# Patient Record
Sex: FEMALE | Race: WHITE | NOT HISPANIC OR LATINO
[De-identification: names, ages, dates, MRNs, and addresses within clinical notes are randomized per-mention and may not be internally consistent; named-entity substitution may affect disease eponyms.]

---

## 2017-02-01 ENCOUNTER — APPOINTMENT (OUTPATIENT)
Dept: CARDIOLOGY | Facility: CLINIC | Age: 62
End: 2017-02-01

## 2017-02-01 ENCOUNTER — NON-APPOINTMENT (OUTPATIENT)
Age: 62
End: 2017-02-01

## 2017-02-01 ENCOUNTER — MEDICATION RENEWAL (OUTPATIENT)
Age: 62
End: 2017-02-01

## 2017-02-01 VITALS
HEIGHT: 62 IN | DIASTOLIC BLOOD PRESSURE: 93 MMHG | OXYGEN SATURATION: 98 % | BODY MASS INDEX: 27.97 KG/M2 | SYSTOLIC BLOOD PRESSURE: 166 MMHG | HEART RATE: 62 BPM | WEIGHT: 152 LBS

## 2017-02-01 LAB — CK SERPL-CCNC: 62 U/L

## 2017-02-01 RX ORDER — CLINDAMYCIN HYDROCHLORIDE 150 MG/1
150 CAPSULE ORAL
Qty: 21 | Refills: 0 | Status: DISCONTINUED | COMMUNITY
Start: 2016-11-03

## 2017-02-02 LAB
CK MB BLD-MCNC: 1.5 NG/ML
CK MB CFR SERPL: 2.4 %
TROPONIN T SERPL-MCNC: <0.01 NG/ML

## 2017-02-07 ENCOUNTER — TRANSCRIPTION ENCOUNTER (OUTPATIENT)
Age: 62
End: 2017-02-07

## 2017-02-09 ENCOUNTER — APPOINTMENT (OUTPATIENT)
Dept: CARDIOLOGY | Facility: CLINIC | Age: 62
End: 2017-02-09

## 2017-02-15 ENCOUNTER — APPOINTMENT (OUTPATIENT)
Dept: CARDIOLOGY | Facility: CLINIC | Age: 62
End: 2017-02-15

## 2017-02-21 ENCOUNTER — RX RENEWAL (OUTPATIENT)
Age: 62
End: 2017-02-21

## 2017-02-23 ENCOUNTER — RX RENEWAL (OUTPATIENT)
Age: 62
End: 2017-02-23

## 2017-03-09 ENCOUNTER — TRANSCRIPTION ENCOUNTER (OUTPATIENT)
Age: 62
End: 2017-03-09

## 2017-03-16 ENCOUNTER — APPOINTMENT (OUTPATIENT)
Dept: INTERNAL MEDICINE | Facility: CLINIC | Age: 62
End: 2017-03-16

## 2017-03-16 VITALS
HEIGHT: 62 IN | BODY MASS INDEX: 27.6 KG/M2 | RESPIRATION RATE: 16 BRPM | HEART RATE: 84 BPM | TEMPERATURE: 97.8 F | OXYGEN SATURATION: 96 % | WEIGHT: 150 LBS | SYSTOLIC BLOOD PRESSURE: 128 MMHG | DIASTOLIC BLOOD PRESSURE: 86 MMHG

## 2017-03-16 DIAGNOSIS — D22.9 MELANOCYTIC NEVI, UNSPECIFIED: ICD-10-CM

## 2017-03-16 DIAGNOSIS — R12 HEARTBURN: ICD-10-CM

## 2017-03-16 LAB
DATE COLLECTED: NORMAL
HEMOCCULT SP1 STL QL: NEGATIVE

## 2017-03-17 ENCOUNTER — MEDICATION RENEWAL (OUTPATIENT)
Age: 62
End: 2017-03-17

## 2017-03-18 LAB
25(OH)D3 SERPL-MCNC: 39.4 NG/ML
ALBUMIN SERPL ELPH-MCNC: 4.6 G/DL
ALP BLD-CCNC: 81 U/L
ALT SERPL-CCNC: 28 U/L
ANION GAP SERPL CALC-SCNC: 16 MMOL/L
AST SERPL-CCNC: 27 U/L
BASOPHILS # BLD AUTO: 0.04 K/UL
BASOPHILS NFR BLD AUTO: 0.6 %
BILIRUB SERPL-MCNC: 0.2 MG/DL
BUN SERPL-MCNC: 19 MG/DL
CALCIUM SERPL-MCNC: 9.8 MG/DL
CHLORIDE SERPL-SCNC: 100 MMOL/L
CHOLEST SERPL-MCNC: 157 MG/DL
CHOLEST/HDLC SERPL: 2 RATIO
CO2 SERPL-SCNC: 25 MMOL/L
CREAT SERPL-MCNC: 0.79 MG/DL
EOSINOPHIL # BLD AUTO: 0.15 K/UL
EOSINOPHIL NFR BLD AUTO: 2.3 %
GLUCOSE SERPL-MCNC: 100 MG/DL
HBA1C MFR BLD HPLC: 6.3 %
HCT VFR BLD CALC: 45.5 %
HDLC SERPL-MCNC: 79 MG/DL
HGB BLD-MCNC: 14.6 G/DL
IMM GRANULOCYTES NFR BLD AUTO: 0.3 %
LDLC SERPL CALC-MCNC: 66 MG/DL
LYMPHOCYTES # BLD AUTO: 1.9 K/UL
LYMPHOCYTES NFR BLD AUTO: 29.2 %
MAN DIFF?: NORMAL
MCHC RBC-ENTMCNC: 29.4 PG
MCHC RBC-ENTMCNC: 32.1 GM/DL
MCV RBC AUTO: 91.7 FL
MONOCYTES # BLD AUTO: 0.47 K/UL
MONOCYTES NFR BLD AUTO: 7.2 %
NEUTROPHILS # BLD AUTO: 3.93 K/UL
NEUTROPHILS NFR BLD AUTO: 60.4 %
PLATELET # BLD AUTO: 339 K/UL
POTASSIUM SERPL-SCNC: 4.6 MMOL/L
PROT SERPL-MCNC: 7.2 G/DL
RBC # BLD: 4.96 M/UL
RBC # FLD: 14.4 %
SODIUM SERPL-SCNC: 141 MMOL/L
TRIGL SERPL-MCNC: 58 MG/DL
TSH SERPL-ACNC: 1.21 UIU/ML
WBC # FLD AUTO: 6.51 K/UL

## 2017-03-24 ENCOUNTER — APPOINTMENT (OUTPATIENT)
Dept: INTERNAL MEDICINE | Facility: CLINIC | Age: 62
End: 2017-03-24

## 2017-05-02 ENCOUNTER — MEDICATION RENEWAL (OUTPATIENT)
Age: 62
End: 2017-05-02

## 2017-05-04 ENCOUNTER — APPOINTMENT (OUTPATIENT)
Dept: DERMATOLOGY | Facility: CLINIC | Age: 62
End: 2017-05-04

## 2017-05-04 VITALS — SYSTOLIC BLOOD PRESSURE: 132 MMHG | DIASTOLIC BLOOD PRESSURE: 80 MMHG

## 2017-05-04 DIAGNOSIS — I78.1 NEVUS, NON-NEOPLASTIC: ICD-10-CM

## 2017-05-04 DIAGNOSIS — L81.4 OTHER MELANIN HYPERPIGMENTATION: ICD-10-CM

## 2017-05-04 DIAGNOSIS — Z12.83 ENCOUNTER FOR SCREENING FOR MALIGNANT NEOPLASM OF SKIN: ICD-10-CM

## 2017-05-04 DIAGNOSIS — L82.1 OTHER SEBORRHEIC KERATOSIS: ICD-10-CM

## 2017-06-27 ENCOUNTER — LABORATORY RESULT (OUTPATIENT)
Age: 62
End: 2017-06-27

## 2017-06-28 LAB
ALBUMIN SERPL ELPH-MCNC: 4.7 G/DL
ALP BLD-CCNC: 77 U/L
ALT SERPL-CCNC: 24 U/L
ANION GAP SERPL CALC-SCNC: 16 MMOL/L
APPEARANCE: CLEAR
AST SERPL-CCNC: 19 U/L
BASOPHILS # BLD AUTO: 0.02 K/UL
BASOPHILS NFR BLD AUTO: 0.3 %
BILIRUB SERPL-MCNC: 0.4 MG/DL
BILIRUBIN URINE: NEGATIVE
BLOOD URINE: ABNORMAL
BUN SERPL-MCNC: 17 MG/DL
CALCIUM SERPL-MCNC: 9.7 MG/DL
CHLORIDE SERPL-SCNC: 99 MMOL/L
CHOLEST SERPL-MCNC: 166 MG/DL
CHOLEST/HDLC SERPL: 1.7 RATIO
CO2 SERPL-SCNC: 26 MMOL/L
COLOR: YELLOW
CREAT SERPL-MCNC: 0.83 MG/DL
EOSINOPHIL # BLD AUTO: 0.16 K/UL
EOSINOPHIL NFR BLD AUTO: 2.4 %
GLUCOSE QUALITATIVE U: NORMAL MG/DL
GLUCOSE SERPL-MCNC: 101 MG/DL
HBA1C MFR BLD HPLC: 6.4 %
HCT VFR BLD CALC: 45.7 %
HDLC SERPL-MCNC: 95 MG/DL
HGB BLD-MCNC: 15.5 G/DL
IMM GRANULOCYTES NFR BLD AUTO: 0.3 %
KETONES URINE: NEGATIVE
LDLC SERPL CALC-MCNC: 61 MG/DL
LEUKOCYTE ESTERASE URINE: NEGATIVE
LYMPHOCYTES # BLD AUTO: 2.12 K/UL
LYMPHOCYTES NFR BLD AUTO: 31.9 %
MAN DIFF?: NORMAL
MCHC RBC-ENTMCNC: 30.5 PG
MCHC RBC-ENTMCNC: 33.9 GM/DL
MCV RBC AUTO: 89.8 FL
MONOCYTES # BLD AUTO: 0.63 K/UL
MONOCYTES NFR BLD AUTO: 9.5 %
NEUTROPHILS # BLD AUTO: 3.7 K/UL
NEUTROPHILS NFR BLD AUTO: 55.6 %
NITRITE URINE: NEGATIVE
PH URINE: 6
PLATELET # BLD AUTO: 272 K/UL
POTASSIUM SERPL-SCNC: 4.8 MMOL/L
PROT SERPL-MCNC: 7.8 G/DL
PROTEIN URINE: NEGATIVE MG/DL
RBC # BLD: 5.09 M/UL
RBC # FLD: 13.8 %
SODIUM SERPL-SCNC: 141 MMOL/L
SPECIFIC GRAVITY URINE: 1.02
TRIGL SERPL-MCNC: 51 MG/DL
UROBILINOGEN URINE: NORMAL MG/DL
WBC # FLD AUTO: 6.65 K/UL

## 2017-07-19 ENCOUNTER — APPOINTMENT (OUTPATIENT)
Dept: MAMMOGRAPHY | Facility: CLINIC | Age: 62
End: 2017-07-19

## 2017-07-19 ENCOUNTER — OUTPATIENT (OUTPATIENT)
Dept: OUTPATIENT SERVICES | Facility: HOSPITAL | Age: 62
LOS: 1 days | End: 2017-07-19
Payer: COMMERCIAL

## 2017-07-19 DIAGNOSIS — Z12.31 ENCOUNTER FOR SCREENING MAMMOGRAM FOR MALIGNANT NEOPLASM OF BREAST: ICD-10-CM

## 2017-07-19 DIAGNOSIS — Z00.8 ENCOUNTER FOR OTHER GENERAL EXAMINATION: ICD-10-CM

## 2017-07-19 PROCEDURE — 77067 SCR MAMMO BI INCL CAD: CPT

## 2017-07-19 PROCEDURE — 77063 BREAST TOMOSYNTHESIS BI: CPT

## 2017-07-31 ENCOUNTER — RX RENEWAL (OUTPATIENT)
Age: 62
End: 2017-07-31

## 2017-08-14 ENCOUNTER — TRANSCRIPTION ENCOUNTER (OUTPATIENT)
Age: 62
End: 2017-08-14

## 2017-08-14 ENCOUNTER — MEDICATION RENEWAL (OUTPATIENT)
Age: 62
End: 2017-08-14

## 2017-08-23 ENCOUNTER — APPOINTMENT (OUTPATIENT)
Dept: DERMATOLOGY | Facility: CLINIC | Age: 62
End: 2017-08-23
Payer: COMMERCIAL

## 2017-08-23 VITALS — SYSTOLIC BLOOD PRESSURE: 126 MMHG | DIASTOLIC BLOOD PRESSURE: 80 MMHG

## 2017-08-23 PROCEDURE — 99213 OFFICE O/P EST LOW 20 MIN: CPT | Mod: 25,GC

## 2017-08-23 PROCEDURE — 17000 DESTRUCT PREMALG LESION: CPT | Mod: GC

## 2017-08-23 PROCEDURE — 11900 INJECT SKIN LESIONS </W 7: CPT | Mod: 59,GC

## 2017-08-29 ENCOUNTER — MEDICATION RENEWAL (OUTPATIENT)
Age: 62
End: 2017-08-29

## 2017-09-20 ENCOUNTER — APPOINTMENT (OUTPATIENT)
Dept: DERMATOLOGY | Facility: CLINIC | Age: 62
End: 2017-09-20
Payer: COMMERCIAL

## 2017-09-20 ENCOUNTER — RESULT REVIEW (OUTPATIENT)
Age: 62
End: 2017-09-20

## 2017-09-20 ENCOUNTER — LABORATORY RESULT (OUTPATIENT)
Age: 62
End: 2017-09-20

## 2017-09-20 VITALS — SYSTOLIC BLOOD PRESSURE: 130 MMHG | DIASTOLIC BLOOD PRESSURE: 84 MMHG

## 2017-09-20 DIAGNOSIS — D48.7 NEOPLASM OF UNCERTAIN BEHAVIOR OF OTHER SPECIFIED SITES: ICD-10-CM

## 2017-09-20 PROCEDURE — 17110 DESTRUCTION B9 LES UP TO 14: CPT

## 2017-09-20 PROCEDURE — 11100 BX SKIN SUBCUTANEOUS&/MUCOUS MEMBRANE 1 LESION: CPT | Mod: 59

## 2017-09-20 PROCEDURE — 99213 OFFICE O/P EST LOW 20 MIN: CPT | Mod: 25

## 2017-09-27 ENCOUNTER — OTHER (OUTPATIENT)
Age: 62
End: 2017-09-27

## 2017-11-08 ENCOUNTER — APPOINTMENT (OUTPATIENT)
Dept: CARDIOLOGY | Facility: CLINIC | Age: 62
End: 2017-11-08
Payer: COMMERCIAL

## 2017-11-08 ENCOUNTER — NON-APPOINTMENT (OUTPATIENT)
Age: 62
End: 2017-11-08

## 2017-11-08 VITALS — DIASTOLIC BLOOD PRESSURE: 90 MMHG | SYSTOLIC BLOOD PRESSURE: 140 MMHG

## 2017-11-08 VITALS
BODY MASS INDEX: 27.97 KG/M2 | WEIGHT: 152 LBS | DIASTOLIC BLOOD PRESSURE: 105 MMHG | SYSTOLIC BLOOD PRESSURE: 156 MMHG | OXYGEN SATURATION: 96 % | HEIGHT: 62 IN | HEART RATE: 67 BPM

## 2017-11-08 PROCEDURE — 99215 OFFICE O/P EST HI 40 MIN: CPT

## 2017-11-08 PROCEDURE — 93000 ELECTROCARDIOGRAM COMPLETE: CPT

## 2018-01-09 ENCOUNTER — TRANSCRIPTION ENCOUNTER (OUTPATIENT)
Age: 63
End: 2018-01-09

## 2018-01-29 ENCOUNTER — RX RENEWAL (OUTPATIENT)
Age: 63
End: 2018-01-29

## 2018-02-27 ENCOUNTER — EMERGENCY (EMERGENCY)
Facility: HOSPITAL | Age: 63
LOS: 1 days | Discharge: HOME | End: 2018-02-27
Attending: EMERGENCY MEDICINE | Admitting: EMERGENCY MEDICINE
Payer: COMMERCIAL

## 2018-02-27 VITALS
SYSTOLIC BLOOD PRESSURE: 146 MMHG | HEART RATE: 71 BPM | OXYGEN SATURATION: 93 % | DIASTOLIC BLOOD PRESSURE: 71 MMHG | TEMPERATURE: 98 F | RESPIRATION RATE: 15 BRPM

## 2018-02-27 VITALS
OXYGEN SATURATION: 96 % | SYSTOLIC BLOOD PRESSURE: 158 MMHG | WEIGHT: 149.91 LBS | DIASTOLIC BLOOD PRESSURE: 81 MMHG | HEART RATE: 78 BPM | RESPIRATION RATE: 16 BRPM | TEMPERATURE: 98 F

## 2018-02-27 LAB
ALBUMIN SERPL ELPH-MCNC: 3.9 G/DL — SIGNIFICANT CHANGE UP (ref 3.3–5)
ALP SERPL-CCNC: 76 U/L — SIGNIFICANT CHANGE UP (ref 40–120)
ALT FLD-CCNC: 36 U/L — SIGNIFICANT CHANGE UP (ref 12–78)
ANION GAP SERPL CALC-SCNC: 8 MMOL/L — SIGNIFICANT CHANGE UP (ref 5–17)
APPEARANCE UR: CLEAR — SIGNIFICANT CHANGE UP
APTT BLD: 27.6 SEC — SIGNIFICANT CHANGE UP (ref 27.5–37.4)
AST SERPL-CCNC: 22 U/L — SIGNIFICANT CHANGE UP (ref 15–37)
BACTERIA # UR AUTO: ABNORMAL
BASOPHILS # BLD AUTO: 0.1 K/UL — SIGNIFICANT CHANGE UP (ref 0–0.2)
BASOPHILS NFR BLD AUTO: 0.5 % — SIGNIFICANT CHANGE UP (ref 0–2)
BILIRUB SERPL-MCNC: 0.4 MG/DL — SIGNIFICANT CHANGE UP (ref 0.2–1.2)
BILIRUB UR-MCNC: NEGATIVE — SIGNIFICANT CHANGE UP
BUN SERPL-MCNC: 10 MG/DL — SIGNIFICANT CHANGE UP (ref 7–23)
CALCIUM SERPL-MCNC: 9 MG/DL — SIGNIFICANT CHANGE UP (ref 8.5–10.1)
CHLORIDE SERPL-SCNC: 100 MMOL/L — SIGNIFICANT CHANGE UP (ref 96–108)
CO2 SERPL-SCNC: 29 MMOL/L — SIGNIFICANT CHANGE UP (ref 22–31)
COLOR SPEC: YELLOW — SIGNIFICANT CHANGE UP
CREAT SERPL-MCNC: 0.59 MG/DL — SIGNIFICANT CHANGE UP (ref 0.5–1.3)
DIFF PNL FLD: ABNORMAL
EOSINOPHIL # BLD AUTO: 0.1 K/UL — SIGNIFICANT CHANGE UP (ref 0–0.5)
EOSINOPHIL NFR BLD AUTO: 1 % — SIGNIFICANT CHANGE UP (ref 0–6)
EPI CELLS # UR: SIGNIFICANT CHANGE UP
GLUCOSE SERPL-MCNC: 113 MG/DL — HIGH (ref 70–99)
GLUCOSE UR QL: NEGATIVE — SIGNIFICANT CHANGE UP
HCT VFR BLD CALC: 40 % — SIGNIFICANT CHANGE UP (ref 34.5–45)
HGB BLD-MCNC: 13.5 G/DL — SIGNIFICANT CHANGE UP (ref 11.5–15.5)
INR BLD: 1.03 RATIO — SIGNIFICANT CHANGE UP (ref 0.88–1.16)
KETONES UR-MCNC: ABNORMAL
LACTATE SERPL-SCNC: 1.3 MMOL/L — SIGNIFICANT CHANGE UP (ref 0.7–2)
LEUKOCYTE ESTERASE UR-ACNC: NEGATIVE — SIGNIFICANT CHANGE UP
LIDOCAIN IGE QN: 119 U/L — SIGNIFICANT CHANGE UP (ref 73–393)
LYMPHOCYTES # BLD AUTO: 1.5 K/UL — SIGNIFICANT CHANGE UP (ref 1–3.3)
LYMPHOCYTES # BLD AUTO: 12.9 % — LOW (ref 13–44)
MCHC RBC-ENTMCNC: 30.7 PG — SIGNIFICANT CHANGE UP (ref 27–34)
MCHC RBC-ENTMCNC: 33.8 GM/DL — SIGNIFICANT CHANGE UP (ref 32–36)
MCV RBC AUTO: 90.9 FL — SIGNIFICANT CHANGE UP (ref 80–100)
MONOCYTES # BLD AUTO: 0.9 K/UL — SIGNIFICANT CHANGE UP (ref 0–0.9)
MONOCYTES NFR BLD AUTO: 7.5 % — SIGNIFICANT CHANGE UP (ref 1–9)
NEUTROPHILS # BLD AUTO: 9.3 K/UL — HIGH (ref 1.8–7.4)
NEUTROPHILS NFR BLD AUTO: 78.1 % — HIGH (ref 43–77)
NITRITE UR-MCNC: NEGATIVE — SIGNIFICANT CHANGE UP
PH UR: 8 — SIGNIFICANT CHANGE UP (ref 5–8)
PLATELET # BLD AUTO: 262 K/UL — SIGNIFICANT CHANGE UP (ref 150–400)
POTASSIUM SERPL-MCNC: 3.7 MMOL/L — SIGNIFICANT CHANGE UP (ref 3.5–5.3)
POTASSIUM SERPL-SCNC: 3.7 MMOL/L — SIGNIFICANT CHANGE UP (ref 3.5–5.3)
PROT SERPL-MCNC: 7.4 G/DL — SIGNIFICANT CHANGE UP (ref 6–8.3)
PROT UR-MCNC: NEGATIVE — SIGNIFICANT CHANGE UP
PROTHROM AB SERPL-ACNC: 11.2 SEC — SIGNIFICANT CHANGE UP (ref 9.8–12.7)
RBC # BLD: 4.4 M/UL — SIGNIFICANT CHANGE UP (ref 3.8–5.2)
RBC # FLD: 12.2 % — SIGNIFICANT CHANGE UP (ref 10.3–14.5)
RBC CASTS # UR COMP ASSIST: ABNORMAL /HPF (ref 0–4)
SODIUM SERPL-SCNC: 137 MMOL/L — SIGNIFICANT CHANGE UP (ref 135–145)
SP GR SPEC: 1.01 — SIGNIFICANT CHANGE UP (ref 1.01–1.02)
UROBILINOGEN FLD QL: NEGATIVE — SIGNIFICANT CHANGE UP
WBC # BLD: 11.9 K/UL — HIGH (ref 3.8–10.5)
WBC # FLD AUTO: 11.9 K/UL — HIGH (ref 3.8–10.5)
WBC UR QL: SIGNIFICANT CHANGE UP

## 2018-02-27 PROCEDURE — 71046 X-RAY EXAM CHEST 2 VIEWS: CPT | Mod: 26

## 2018-02-27 PROCEDURE — 99285 EMERGENCY DEPT VISIT HI MDM: CPT

## 2018-02-27 PROCEDURE — 74177 CT ABD & PELVIS W/CONTRAST: CPT | Mod: 26

## 2018-02-27 RX ORDER — CIPROFLOXACIN LACTATE 400MG/40ML
500 VIAL (ML) INTRAVENOUS ONCE
Qty: 0 | Refills: 0 | Status: COMPLETED | OUTPATIENT
Start: 2018-02-27 | End: 2018-02-27

## 2018-02-27 RX ORDER — ONDANSETRON 8 MG/1
4 TABLET, FILM COATED ORAL ONCE
Qty: 0 | Refills: 0 | Status: COMPLETED | OUTPATIENT
Start: 2018-02-27 | End: 2018-02-27

## 2018-02-27 RX ORDER — SODIUM CHLORIDE 9 MG/ML
3 INJECTION INTRAMUSCULAR; INTRAVENOUS; SUBCUTANEOUS ONCE
Qty: 0 | Refills: 0 | Status: COMPLETED | OUTPATIENT
Start: 2018-02-27 | End: 2018-02-27

## 2018-02-27 RX ORDER — METRONIDAZOLE 500 MG
500 TABLET ORAL ONCE
Qty: 0 | Refills: 0 | Status: COMPLETED | OUTPATIENT
Start: 2018-02-27 | End: 2018-02-27

## 2018-02-27 RX ORDER — MORPHINE SULFATE 50 MG/1
4 CAPSULE, EXTENDED RELEASE ORAL ONCE
Qty: 0 | Refills: 0 | Status: DISCONTINUED | OUTPATIENT
Start: 2018-02-27 | End: 2018-02-27

## 2018-02-27 RX ORDER — IOHEXOL 300 MG/ML
30 INJECTION, SOLUTION INTRAVENOUS ONCE
Qty: 0 | Refills: 0 | Status: COMPLETED | OUTPATIENT
Start: 2018-02-27 | End: 2018-02-27

## 2018-02-27 RX ORDER — SODIUM CHLORIDE 9 MG/ML
1000 INJECTION INTRAMUSCULAR; INTRAVENOUS; SUBCUTANEOUS
Qty: 0 | Refills: 0 | Status: COMPLETED | OUTPATIENT
Start: 2018-02-27 | End: 2018-02-27

## 2018-02-27 RX ADMIN — SODIUM CHLORIDE 1000 MILLILITER(S): 9 INJECTION INTRAMUSCULAR; INTRAVENOUS; SUBCUTANEOUS at 21:16

## 2018-02-27 RX ADMIN — MORPHINE SULFATE 4 MILLIGRAM(S): 50 CAPSULE, EXTENDED RELEASE ORAL at 21:00

## 2018-02-27 RX ADMIN — ONDANSETRON 4 MILLIGRAM(S): 8 TABLET, FILM COATED ORAL at 21:00

## 2018-02-27 RX ADMIN — IOHEXOL 30 MILLILITER(S): 300 INJECTION, SOLUTION INTRAVENOUS at 19:30

## 2018-02-27 RX ADMIN — SODIUM CHLORIDE 3 MILLILITER(S): 9 INJECTION INTRAMUSCULAR; INTRAVENOUS; SUBCUTANEOUS at 19:30

## 2018-02-27 RX ADMIN — MORPHINE SULFATE 4 MILLIGRAM(S): 50 CAPSULE, EXTENDED RELEASE ORAL at 21:29

## 2018-02-27 RX ADMIN — SODIUM CHLORIDE 1000 MILLILITER(S): 9 INJECTION INTRAMUSCULAR; INTRAVENOUS; SUBCUTANEOUS at 19:31

## 2018-02-27 NOTE — ED PROVIDER NOTE - CHPI ED SYMPTOMS NEG
no chills/no abdominal distension/no diarrhea/no blood in stool/no hematuria/no burning urination/no vomiting/no dysuria/no fever

## 2018-02-27 NOTE — ED ADULT NURSE NOTE - OBJECTIVE STATEMENT
62 year old female presents to the ED with c/o anup lower abdominal pain, nausea, and diarrhea (1 episode) x 1 day. A+O x 4. Sister at the bedisde. + BS in all quadrants. Anup lower quadrants tender to palpation. Abdomen appears distended. Bladder distended and tender to palpation. Denies hematuria, dysuria, rectal bleeding. Denies recent sick contacts. Denies ingesting suspicious food. afebrile. skin warm dry and intact.

## 2018-02-27 NOTE — ED ADULT NURSE NOTE - PMH
CAD (coronary artery disease)    Cervical cancer  with leep procedure, denies chemo/ radiation  Hypercholesteremia    Hypertension    S/P coronary artery stent placement  5 years ago Dr. Guille Joyner

## 2018-02-27 NOTE — ED PROVIDER NOTE - PROGRESS NOTE DETAILS
Dw pt re CT finding,s need for abx for divertic. Dw her re large ov cysts. Pt states are well known to her for many years and are being followed by her GYN. Dw pt re need for US to ensure flow to ov. Dw pt and family RBA< refusing US at this time. Will take abx for divertic, will fu with her PMD tomorrow and will call her GYN tomorrow for prompt fu as well.  All results were explained to patient and family and a copy of all available results given.   Discussed with Patent and Family regarding the nature of the patient's infection.  At length discussion regarding the signs and symptoms of a persistent or worsening infection, the importance of close, prompt medical follow-up, and infection / fever precautions including when to immediately return to the emergency department.  An opportunity to ask questions was given and understanding of all instructions was verbalized.

## 2018-02-27 NOTE — ED ADULT NURSE NOTE - CHPI ED SYMPTOMS POS
PAIN/CRAMPS/dry heaves/NAUSEA/DECREASED EATING/DRINKING/ABDOMINAL DISTENSION/TENDERNESS/VOMITING/CONSTIPATION/DIARRHEA

## 2018-02-27 NOTE — ED PROVIDER NOTE - OBJECTIVE STATEMENT
63 yo F p/w lower abd pain since ~ 1 pm today. No vomiting / diarrhea. NO neck / back pain. No fever/chills. NO recent fall / trauma. NO agg/allev factors. NO other inj or co.

## 2018-02-27 NOTE — ED ADULT NURSE NOTE - PSH
S/P angioplasty with stent    S/P bunionectomy  right    Status post bunionectomy  right(2 yrs. ago)  Status post

## 2018-02-28 LAB
CULTURE RESULTS: SIGNIFICANT CHANGE UP
SPECIMEN SOURCE: SIGNIFICANT CHANGE UP

## 2018-02-28 PROCEDURE — 74177 CT ABD & PELVIS W/CONTRAST: CPT

## 2018-02-28 PROCEDURE — 85610 PROTHROMBIN TIME: CPT

## 2018-02-28 PROCEDURE — 83605 ASSAY OF LACTIC ACID: CPT

## 2018-02-28 PROCEDURE — 96365 THER/PROPH/DIAG IV INF INIT: CPT | Mod: XU

## 2018-02-28 PROCEDURE — 96375 TX/PRO/DX INJ NEW DRUG ADDON: CPT

## 2018-02-28 PROCEDURE — 71046 X-RAY EXAM CHEST 2 VIEWS: CPT

## 2018-02-28 PROCEDURE — 80053 COMPREHEN METABOLIC PANEL: CPT

## 2018-02-28 PROCEDURE — 85730 THROMBOPLASTIN TIME PARTIAL: CPT

## 2018-02-28 PROCEDURE — 87086 URINE CULTURE/COLONY COUNT: CPT

## 2018-02-28 PROCEDURE — 81001 URINALYSIS AUTO W/SCOPE: CPT

## 2018-02-28 PROCEDURE — 85027 COMPLETE CBC AUTOMATED: CPT

## 2018-02-28 PROCEDURE — 99284 EMERGENCY DEPT VISIT MOD MDM: CPT | Mod: 25

## 2018-02-28 PROCEDURE — 83690 ASSAY OF LIPASE: CPT

## 2018-02-28 RX ORDER — METRONIDAZOLE 500 MG
1 TABLET ORAL
Qty: 20 | Refills: 0 | OUTPATIENT
Start: 2018-02-28 | End: 2018-03-09

## 2018-02-28 RX ORDER — MOXIFLOXACIN HYDROCHLORIDE TABLETS, 400 MG 400 MG/1
1 TABLET, FILM COATED ORAL
Qty: 20 | Refills: 0 | OUTPATIENT
Start: 2018-02-28 | End: 2018-03-09

## 2018-02-28 RX ADMIN — Medication 100 MILLIGRAM(S): at 00:04

## 2018-02-28 RX ADMIN — Medication 500 MILLIGRAM(S): at 00:14

## 2018-03-06 ENCOUNTER — RESULT REVIEW (OUTPATIENT)
Age: 63
End: 2018-03-06

## 2018-03-08 ENCOUNTER — EMERGENCY (EMERGENCY)
Facility: HOSPITAL | Age: 63
LOS: 1 days | Discharge: HOME | End: 2018-03-08
Attending: EMERGENCY MEDICINE | Admitting: EMERGENCY MEDICINE
Payer: COMMERCIAL

## 2018-03-08 VITALS
HEART RATE: 88 BPM | DIASTOLIC BLOOD PRESSURE: 80 MMHG | SYSTOLIC BLOOD PRESSURE: 128 MMHG | OXYGEN SATURATION: 98 % | RESPIRATION RATE: 18 BRPM

## 2018-03-08 VITALS — HEIGHT: 62 IN | WEIGHT: 149.03 LBS

## 2018-03-08 LAB
ALBUMIN SERPL ELPH-MCNC: 3.6 G/DL — SIGNIFICANT CHANGE UP (ref 3.3–5)
ALP SERPL-CCNC: 74 U/L — SIGNIFICANT CHANGE UP (ref 40–120)
ALT FLD-CCNC: 94 U/L — HIGH (ref 12–78)
ANION GAP SERPL CALC-SCNC: 10 MMOL/L — SIGNIFICANT CHANGE UP (ref 5–17)
APPEARANCE UR: CLEAR — SIGNIFICANT CHANGE UP
APTT BLD: 28.7 SEC — SIGNIFICANT CHANGE UP (ref 27.5–37.4)
AST SERPL-CCNC: 130 U/L — HIGH (ref 15–37)
BASOPHILS # BLD AUTO: 0.1 K/UL — SIGNIFICANT CHANGE UP (ref 0–0.2)
BASOPHILS NFR BLD AUTO: 2.1 % — HIGH (ref 0–2)
BILIRUB SERPL-MCNC: 0.2 MG/DL — SIGNIFICANT CHANGE UP (ref 0.2–1.2)
BILIRUB UR-MCNC: NEGATIVE — SIGNIFICANT CHANGE UP
BUN SERPL-MCNC: 3 MG/DL — LOW (ref 7–23)
CALCIUM SERPL-MCNC: 8.5 MG/DL — SIGNIFICANT CHANGE UP (ref 8.5–10.1)
CHLORIDE SERPL-SCNC: 106 MMOL/L — SIGNIFICANT CHANGE UP (ref 96–108)
CO2 SERPL-SCNC: 24 MMOL/L — SIGNIFICANT CHANGE UP (ref 22–31)
COLOR SPEC: YELLOW — SIGNIFICANT CHANGE UP
CREAT SERPL-MCNC: 0.82 MG/DL — SIGNIFICANT CHANGE UP (ref 0.5–1.3)
DIFF PNL FLD: ABNORMAL
EOSINOPHIL # BLD AUTO: 0.1 K/UL — SIGNIFICANT CHANGE UP (ref 0–0.5)
EOSINOPHIL NFR BLD AUTO: 2.5 % — SIGNIFICANT CHANGE UP (ref 0–6)
EPI CELLS # UR: SIGNIFICANT CHANGE UP
GLUCOSE SERPL-MCNC: 122 MG/DL — HIGH (ref 70–99)
GLUCOSE UR QL: NEGATIVE — SIGNIFICANT CHANGE UP
HCT VFR BLD CALC: 44.7 % — SIGNIFICANT CHANGE UP (ref 34.5–45)
HGB BLD-MCNC: 15 G/DL — SIGNIFICANT CHANGE UP (ref 11.5–15.5)
INR BLD: 1.1 RATIO — SIGNIFICANT CHANGE UP (ref 0.88–1.16)
KETONES UR-MCNC: ABNORMAL
LEUKOCYTE ESTERASE UR-ACNC: NEGATIVE — SIGNIFICANT CHANGE UP
LIDOCAIN IGE QN: 104 U/L — SIGNIFICANT CHANGE UP (ref 73–393)
LYMPHOCYTES # BLD AUTO: 0.6 K/UL — LOW (ref 1–3.3)
LYMPHOCYTES # BLD AUTO: 15.1 % — SIGNIFICANT CHANGE UP (ref 13–44)
MCHC RBC-ENTMCNC: 30.1 PG — SIGNIFICANT CHANGE UP (ref 27–34)
MCHC RBC-ENTMCNC: 33.6 GM/DL — SIGNIFICANT CHANGE UP (ref 32–36)
MCV RBC AUTO: 89.5 FL — SIGNIFICANT CHANGE UP (ref 80–100)
MONOCYTES # BLD AUTO: 0.6 K/UL — SIGNIFICANT CHANGE UP (ref 0–0.9)
MONOCYTES NFR BLD AUTO: 15.6 % — HIGH (ref 1–9)
NEUTROPHILS # BLD AUTO: 2.5 K/UL — SIGNIFICANT CHANGE UP (ref 1.8–7.4)
NEUTROPHILS NFR BLD AUTO: 64.7 % — SIGNIFICANT CHANGE UP (ref 43–77)
NITRITE UR-MCNC: NEGATIVE — SIGNIFICANT CHANGE UP
PH UR: 7 — SIGNIFICANT CHANGE UP (ref 5–8)
PLATELET # BLD AUTO: 248 K/UL — SIGNIFICANT CHANGE UP (ref 150–400)
POTASSIUM SERPL-MCNC: 3.5 MMOL/L — SIGNIFICANT CHANGE UP (ref 3.5–5.3)
POTASSIUM SERPL-SCNC: 3.5 MMOL/L — SIGNIFICANT CHANGE UP (ref 3.5–5.3)
PROT SERPL-MCNC: 7.3 G/DL — SIGNIFICANT CHANGE UP (ref 6–8.3)
PROT UR-MCNC: NEGATIVE — SIGNIFICANT CHANGE UP
PROTHROM AB SERPL-ACNC: 12 SEC — SIGNIFICANT CHANGE UP (ref 9.8–12.7)
RBC # BLD: 4.99 M/UL — SIGNIFICANT CHANGE UP (ref 3.8–5.2)
RBC # FLD: 12 % — SIGNIFICANT CHANGE UP (ref 10.3–14.5)
RBC CASTS # UR COMP ASSIST: ABNORMAL /HPF (ref 0–4)
SODIUM SERPL-SCNC: 140 MMOL/L — SIGNIFICANT CHANGE UP (ref 135–145)
SP GR SPEC: 1 — LOW (ref 1.01–1.02)
UROBILINOGEN FLD QL: NEGATIVE — SIGNIFICANT CHANGE UP
WBC # BLD: 3.9 K/UL — SIGNIFICANT CHANGE UP (ref 3.8–10.5)
WBC # FLD AUTO: 3.9 K/UL — SIGNIFICANT CHANGE UP (ref 3.8–10.5)
WBC UR QL: SIGNIFICANT CHANGE UP

## 2018-03-08 PROCEDURE — 99285 EMERGENCY DEPT VISIT HI MDM: CPT

## 2018-03-08 PROCEDURE — 96361 HYDRATE IV INFUSION ADD-ON: CPT

## 2018-03-08 PROCEDURE — 96374 THER/PROPH/DIAG INJ IV PUSH: CPT

## 2018-03-08 PROCEDURE — 85730 THROMBOPLASTIN TIME PARTIAL: CPT

## 2018-03-08 PROCEDURE — 70450 CT HEAD/BRAIN W/O DYE: CPT

## 2018-03-08 PROCEDURE — 99284 EMERGENCY DEPT VISIT MOD MDM: CPT | Mod: 25

## 2018-03-08 PROCEDURE — 85027 COMPLETE CBC AUTOMATED: CPT

## 2018-03-08 PROCEDURE — 96375 TX/PRO/DX INJ NEW DRUG ADDON: CPT

## 2018-03-08 PROCEDURE — 70450 CT HEAD/BRAIN W/O DYE: CPT | Mod: 26

## 2018-03-08 PROCEDURE — 83690 ASSAY OF LIPASE: CPT

## 2018-03-08 PROCEDURE — 81001 URINALYSIS AUTO W/SCOPE: CPT

## 2018-03-08 PROCEDURE — 80053 COMPREHEN METABOLIC PANEL: CPT

## 2018-03-08 PROCEDURE — 85610 PROTHROMBIN TIME: CPT

## 2018-03-08 RX ORDER — KETOROLAC TROMETHAMINE 30 MG/ML
30 SYRINGE (ML) INJECTION ONCE
Qty: 0 | Refills: 0 | Status: DISCONTINUED | OUTPATIENT
Start: 2018-03-08 | End: 2018-03-08

## 2018-03-08 RX ORDER — METOCLOPRAMIDE HCL 10 MG
10 TABLET ORAL ONCE
Qty: 0 | Refills: 0 | Status: COMPLETED | OUTPATIENT
Start: 2018-03-08 | End: 2018-03-08

## 2018-03-08 RX ORDER — SODIUM CHLORIDE 9 MG/ML
1000 INJECTION INTRAMUSCULAR; INTRAVENOUS; SUBCUTANEOUS ONCE
Qty: 0 | Refills: 0 | Status: COMPLETED | OUTPATIENT
Start: 2018-03-08 | End: 2018-03-08

## 2018-03-08 RX ADMIN — Medication 10 MILLIGRAM(S): at 08:58

## 2018-03-08 RX ADMIN — Medication 30 MILLIGRAM(S): at 08:58

## 2018-03-08 RX ADMIN — SODIUM CHLORIDE 1000 MILLILITER(S): 9 INJECTION INTRAMUSCULAR; INTRAVENOUS; SUBCUTANEOUS at 08:57

## 2018-03-08 NOTE — ED PROVIDER NOTE - PROGRESS NOTE DETAILS
Reevaluated patient at bedside.  Patient feeling much improved, pain resolved.  Discussed the results of all diagnostic testing in ED and copies of all reports given.   An opportunity to ask questions was given.  Discussed the importance of prompt, close medical follow-up.  Patient will return with any changes, concerns or persistent / worsening symptoms.  Understanding of all instructions verbalized.

## 2018-03-08 NOTE — ED ADULT TRIAGE NOTE - CHIEF COMPLAINT QUOTE
pt reporting weakness, vomiting and headache. recently diagnosed with diverticulitis on 8th day of cipro/flagyl

## 2018-03-08 NOTE — ED PROVIDER NOTE - OBJECTIVE STATEMENT
pt on cipro and flagyl since 2/28 for divertic on ct c/o ha, n/v, worse after taking her abx. no fevers, chills, dizziness, weakness, numbness, speech or vision changes, abd pain. + urinary freq, but has been drinking more liquids since dx with divertic.  pmd - noemy

## 2018-03-08 NOTE — ED PROVIDER NOTE - MEDICAL DECISION MAKING DETAILS
pt on cipro flagyl since 2/28 for divertic c/o n/v and ha, worse when takes abx - labs/ct/ivf/reglan/toradol

## 2018-03-08 NOTE — ED PROVIDER NOTE - PSH
S/P angioplasty with stent   and   S/P bunionectomy  right    Status post bunionectomy  right(2 yrs. ago)  Status post

## 2018-03-18 LAB
25(OH)D3 SERPL-MCNC: 42.7 NG/ML
ALBUMIN SERPL ELPH-MCNC: 4.4 G/DL
ALP BLD-CCNC: 73 U/L
ALT SERPL-CCNC: 56 U/L
ANION GAP SERPL CALC-SCNC: 13 MMOL/L
APPEARANCE: CLEAR
AST SERPL-CCNC: 31 U/L
BASOPHILS # BLD AUTO: 0.07 K/UL
BASOPHILS NFR BLD AUTO: 1.5 %
BILIRUB SERPL-MCNC: 0.4 MG/DL
BILIRUBIN URINE: NEGATIVE
BLOOD URINE: NEGATIVE
BUN SERPL-MCNC: 13 MG/DL
CALCIUM SERPL-MCNC: 10.1 MG/DL
CHLORIDE SERPL-SCNC: 104 MMOL/L
CHOLEST SERPL-MCNC: 151 MG/DL
CHOLEST/HDLC SERPL: 3.1 RATIO
CO2 SERPL-SCNC: 28 MMOL/L
COLOR: YELLOW
CREAT SERPL-MCNC: 0.75 MG/DL
CREAT SPEC-SCNC: 36 MG/DL
EOSINOPHIL # BLD AUTO: 0.12 K/UL
EOSINOPHIL NFR BLD AUTO: 2.5 %
GLUCOSE QUALITATIVE U: NEGATIVE MG/DL
GLUCOSE SERPL-MCNC: 105 MG/DL
HBA1C MFR BLD HPLC: 6.1 %
HCT VFR BLD CALC: 41.5 %
HDLC SERPL-MCNC: 49 MG/DL
HGB BLD-MCNC: 14 G/DL
IMM GRANULOCYTES NFR BLD AUTO: 0.4 %
KETONES URINE: NEGATIVE
LDLC SERPL CALC-MCNC: 82 MG/DL
LEUKOCYTE ESTERASE URINE: NEGATIVE
LYMPHOCYTES # BLD AUTO: 1.62 K/UL
LYMPHOCYTES NFR BLD AUTO: 33.7 %
MAN DIFF?: NORMAL
MCHC RBC-ENTMCNC: 30.5 PG
MCHC RBC-ENTMCNC: 33.7 GM/DL
MCV RBC AUTO: 90.4 FL
MICROALBUMIN 24H UR DL<=1MG/L-MCNC: <0.3 MG/DL
MICROALBUMIN/CREAT 24H UR-RTO: NORMAL
MONOCYTES # BLD AUTO: 0.43 K/UL
MONOCYTES NFR BLD AUTO: 8.9 %
NEUTROPHILS # BLD AUTO: 2.55 K/UL
NEUTROPHILS NFR BLD AUTO: 53 %
NITRITE URINE: NEGATIVE
PH URINE: 7
PLATELET # BLD AUTO: 387 K/UL
POTASSIUM SERPL-SCNC: 5.2 MMOL/L
PROT SERPL-MCNC: 7 G/DL
PROTEIN URINE: NEGATIVE MG/DL
RBC # BLD: 4.59 M/UL
RBC # FLD: 13.2 %
SODIUM SERPL-SCNC: 145 MMOL/L
SPECIFIC GRAVITY URINE: 1.01
TRIGL SERPL-MCNC: 100 MG/DL
TSH SERPL-ACNC: 1.23 UIU/ML
UROBILINOGEN URINE: NEGATIVE MG/DL
WBC # FLD AUTO: 4.81 K/UL

## 2018-03-29 ENCOUNTER — APPOINTMENT (OUTPATIENT)
Dept: INTERNAL MEDICINE | Facility: CLINIC | Age: 63
End: 2018-03-29
Payer: COMMERCIAL

## 2018-03-29 VITALS
RESPIRATION RATE: 16 BRPM | HEIGHT: 62 IN | OXYGEN SATURATION: 97 % | DIASTOLIC BLOOD PRESSURE: 80 MMHG | HEART RATE: 65 BPM | BODY MASS INDEX: 27.05 KG/M2 | WEIGHT: 147 LBS | TEMPERATURE: 97.8 F | SYSTOLIC BLOOD PRESSURE: 138 MMHG

## 2018-03-29 DIAGNOSIS — I77.1 STRICTURE OF ARTERY: ICD-10-CM

## 2018-03-29 PROCEDURE — 99396 PREV VISIT EST AGE 40-64: CPT

## 2018-04-01 ENCOUNTER — FORM ENCOUNTER (OUTPATIENT)
Age: 63
End: 2018-04-01

## 2018-04-02 ENCOUNTER — APPOINTMENT (OUTPATIENT)
Dept: ULTRASOUND IMAGING | Facility: CLINIC | Age: 63
End: 2018-04-02
Payer: COMMERCIAL

## 2018-04-02 ENCOUNTER — OUTPATIENT (OUTPATIENT)
Dept: OUTPATIENT SERVICES | Facility: HOSPITAL | Age: 63
LOS: 1 days | End: 2018-04-02
Payer: COMMERCIAL

## 2018-04-02 DIAGNOSIS — R22.2 LOCALIZED SWELLING, MASS AND LUMP, TRUNK: ICD-10-CM

## 2018-04-02 PROCEDURE — 76604 US EXAM CHEST: CPT | Mod: 26

## 2018-04-02 PROCEDURE — 76604 US EXAM CHEST: CPT

## 2018-04-13 ENCOUNTER — RESULT REVIEW (OUTPATIENT)
Age: 63
End: 2018-04-13

## 2018-04-27 ENCOUNTER — RX RENEWAL (OUTPATIENT)
Age: 63
End: 2018-04-27

## 2018-05-21 ENCOUNTER — RX RENEWAL (OUTPATIENT)
Age: 63
End: 2018-05-21

## 2018-05-23 ENCOUNTER — APPOINTMENT (OUTPATIENT)
Dept: CT IMAGING | Facility: CLINIC | Age: 63
End: 2018-05-23
Payer: COMMERCIAL

## 2018-05-23 ENCOUNTER — OUTPATIENT (OUTPATIENT)
Dept: OUTPATIENT SERVICES | Facility: HOSPITAL | Age: 63
LOS: 1 days | End: 2018-05-23
Payer: COMMERCIAL

## 2018-05-23 DIAGNOSIS — Z00.8 ENCOUNTER FOR OTHER GENERAL EXAMINATION: ICD-10-CM

## 2018-05-23 PROCEDURE — 74176 CT ABD & PELVIS W/O CONTRAST: CPT | Mod: 26

## 2018-05-23 PROCEDURE — 74176 CT ABD & PELVIS W/O CONTRAST: CPT

## 2018-05-24 ENCOUNTER — INPATIENT (INPATIENT)
Facility: HOSPITAL | Age: 63
LOS: 0 days | Discharge: HOME | DRG: 392 | End: 2018-05-25
Attending: INTERNAL MEDICINE | Admitting: INTERNAL MEDICINE
Payer: COMMERCIAL

## 2018-05-24 VITALS
TEMPERATURE: 101 F | HEART RATE: 96 BPM | OXYGEN SATURATION: 96 % | RESPIRATION RATE: 16 BRPM | SYSTOLIC BLOOD PRESSURE: 125 MMHG | DIASTOLIC BLOOD PRESSURE: 81 MMHG

## 2018-05-24 DIAGNOSIS — A41.9 SEPSIS, UNSPECIFIED ORGANISM: ICD-10-CM

## 2018-05-24 DIAGNOSIS — I25.10 ATHEROSCLEROTIC HEART DISEASE OF NATIVE CORONARY ARTERY WITHOUT ANGINA PECTORIS: ICD-10-CM

## 2018-05-24 DIAGNOSIS — E78.00 PURE HYPERCHOLESTEROLEMIA, UNSPECIFIED: ICD-10-CM

## 2018-05-24 DIAGNOSIS — R51 HEADACHE: ICD-10-CM

## 2018-05-24 DIAGNOSIS — I10 ESSENTIAL (PRIMARY) HYPERTENSION: ICD-10-CM

## 2018-05-24 DIAGNOSIS — Z29.9 ENCOUNTER FOR PROPHYLACTIC MEASURES, UNSPECIFIED: ICD-10-CM

## 2018-05-24 DIAGNOSIS — K57.92 DIVERTICULITIS OF INTESTINE, PART UNSPECIFIED, WITHOUT PERFORATION OR ABSCESS WITHOUT BLEEDING: ICD-10-CM

## 2018-05-24 DIAGNOSIS — R94.31 ABNORMAL ELECTROCARDIOGRAM [ECG] [EKG]: ICD-10-CM

## 2018-05-24 DIAGNOSIS — R21 RASH AND OTHER NONSPECIFIC SKIN ERUPTION: ICD-10-CM

## 2018-05-24 LAB
ALBUMIN SERPL ELPH-MCNC: 3.5 G/DL — SIGNIFICANT CHANGE UP (ref 3.3–5)
ALP SERPL-CCNC: 82 U/L — SIGNIFICANT CHANGE UP (ref 40–120)
ALT FLD-CCNC: 37 U/L — SIGNIFICANT CHANGE UP (ref 12–78)
ANION GAP SERPL CALC-SCNC: 9 MMOL/L — SIGNIFICANT CHANGE UP (ref 5–17)
APTT BLD: 27.8 SEC — SIGNIFICANT CHANGE UP (ref 27.5–37.4)
AST SERPL-CCNC: 28 U/L — SIGNIFICANT CHANGE UP (ref 15–37)
BASOPHILS # BLD AUTO: 0.02 K/UL — SIGNIFICANT CHANGE UP (ref 0–0.2)
BASOPHILS NFR BLD AUTO: 0.3 % — SIGNIFICANT CHANGE UP (ref 0–2)
BILIRUB SERPL-MCNC: 0.3 MG/DL — SIGNIFICANT CHANGE UP (ref 0.2–1.2)
BUN SERPL-MCNC: 10 MG/DL — SIGNIFICANT CHANGE UP (ref 7–23)
CALCIUM SERPL-MCNC: 8.3 MG/DL — LOW (ref 8.5–10.1)
CHLORIDE SERPL-SCNC: 102 MMOL/L — SIGNIFICANT CHANGE UP (ref 96–108)
CO2 SERPL-SCNC: 26 MMOL/L — SIGNIFICANT CHANGE UP (ref 22–31)
CREAT SERPL-MCNC: 0.76 MG/DL — SIGNIFICANT CHANGE UP (ref 0.5–1.3)
EOSINOPHIL # BLD AUTO: 0.02 K/UL — SIGNIFICANT CHANGE UP (ref 0–0.5)
EOSINOPHIL NFR BLD AUTO: 0.3 % — SIGNIFICANT CHANGE UP (ref 0–6)
GLUCOSE SERPL-MCNC: 131 MG/DL — HIGH (ref 70–99)
HCT VFR BLD CALC: 41.4 % — SIGNIFICANT CHANGE UP (ref 34.5–45)
HGB BLD-MCNC: 14.2 G/DL — SIGNIFICANT CHANGE UP (ref 11.5–15.5)
IMM GRANULOCYTES NFR BLD AUTO: 0.5 % — SIGNIFICANT CHANGE UP (ref 0–1.5)
INR BLD: 1.13 RATIO — SIGNIFICANT CHANGE UP (ref 0.88–1.16)
LACTATE SERPL-SCNC: 1.2 MMOL/L — SIGNIFICANT CHANGE UP (ref 0.7–2)
LIDOCAIN IGE QN: 108 U/L — SIGNIFICANT CHANGE UP (ref 73–393)
LYMPHOCYTES # BLD AUTO: 0.31 K/UL — LOW (ref 1–3.3)
LYMPHOCYTES # BLD AUTO: 3.9 % — LOW (ref 13–44)
MCHC RBC-ENTMCNC: 30.1 PG — SIGNIFICANT CHANGE UP (ref 27–34)
MCHC RBC-ENTMCNC: 34.3 GM/DL — SIGNIFICANT CHANGE UP (ref 32–36)
MCV RBC AUTO: 87.9 FL — SIGNIFICANT CHANGE UP (ref 80–100)
MONOCYTES # BLD AUTO: 0.37 K/UL — SIGNIFICANT CHANGE UP (ref 0–0.9)
MONOCYTES NFR BLD AUTO: 4.7 % — SIGNIFICANT CHANGE UP (ref 2–14)
NEUTROPHILS # BLD AUTO: 7.17 K/UL — SIGNIFICANT CHANGE UP (ref 1.8–7.4)
NEUTROPHILS NFR BLD AUTO: 90.3 % — HIGH (ref 43–77)
PLATELET # BLD AUTO: 241 K/UL — SIGNIFICANT CHANGE UP (ref 150–400)
POTASSIUM SERPL-MCNC: 3.6 MMOL/L — SIGNIFICANT CHANGE UP (ref 3.5–5.3)
POTASSIUM SERPL-SCNC: 3.6 MMOL/L — SIGNIFICANT CHANGE UP (ref 3.5–5.3)
PROT SERPL-MCNC: 7.6 G/DL — SIGNIFICANT CHANGE UP (ref 6–8.3)
PROTHROM AB SERPL-ACNC: 12.4 SEC — SIGNIFICANT CHANGE UP (ref 9.8–12.7)
RBC # BLD: 4.71 M/UL — SIGNIFICANT CHANGE UP (ref 3.8–5.2)
RBC # FLD: 12.8 % — SIGNIFICANT CHANGE UP (ref 10.3–14.5)
SODIUM SERPL-SCNC: 137 MMOL/L — SIGNIFICANT CHANGE UP (ref 135–145)
WBC # BLD: 7.93 K/UL — SIGNIFICANT CHANGE UP (ref 3.8–10.5)
WBC # FLD AUTO: 7.93 K/UL — SIGNIFICANT CHANGE UP (ref 3.8–10.5)

## 2018-05-24 PROCEDURE — 93010 ELECTROCARDIOGRAM REPORT: CPT

## 2018-05-24 PROCEDURE — 99285 EMERGENCY DEPT VISIT HI MDM: CPT

## 2018-05-24 RX ORDER — SODIUM CHLORIDE 9 MG/ML
1000 INJECTION INTRAMUSCULAR; INTRAVENOUS; SUBCUTANEOUS
Qty: 0 | Refills: 0 | Status: DISCONTINUED | OUTPATIENT
Start: 2018-05-24 | End: 2018-05-24

## 2018-05-24 RX ORDER — TRAMADOL HYDROCHLORIDE 50 MG/1
25 TABLET ORAL ONCE
Qty: 0 | Refills: 0 | Status: DISCONTINUED | OUTPATIENT
Start: 2018-05-24 | End: 2018-05-24

## 2018-05-24 RX ORDER — METRONIDAZOLE 500 MG
500 TABLET ORAL ONCE
Qty: 0 | Refills: 0 | Status: COMPLETED | OUTPATIENT
Start: 2018-05-24 | End: 2018-05-24

## 2018-05-24 RX ORDER — CIPROFLOXACIN LACTATE 400MG/40ML
400 VIAL (ML) INTRAVENOUS EVERY 12 HOURS
Qty: 0 | Refills: 0 | Status: DISCONTINUED | OUTPATIENT
Start: 2018-05-24 | End: 2018-05-24

## 2018-05-24 RX ORDER — ONDANSETRON 8 MG/1
4 TABLET, FILM COATED ORAL EVERY 8 HOURS
Qty: 0 | Refills: 0 | Status: DISCONTINUED | OUTPATIENT
Start: 2018-05-24 | End: 2018-05-25

## 2018-05-24 RX ORDER — METRONIDAZOLE 500 MG
500 TABLET ORAL EVERY 8 HOURS
Qty: 0 | Refills: 0 | Status: DISCONTINUED | OUTPATIENT
Start: 2018-05-24 | End: 2018-05-24

## 2018-05-24 RX ORDER — SODIUM CHLORIDE 9 MG/ML
1000 INJECTION INTRAMUSCULAR; INTRAVENOUS; SUBCUTANEOUS ONCE
Qty: 0 | Refills: 0 | Status: COMPLETED | OUTPATIENT
Start: 2018-05-24 | End: 2018-05-24

## 2018-05-24 RX ORDER — ASPIRIN/CALCIUM CARB/MAGNESIUM 324 MG
81 TABLET ORAL DAILY
Qty: 0 | Refills: 0 | Status: DISCONTINUED | OUTPATIENT
Start: 2018-05-24 | End: 2018-05-25

## 2018-05-24 RX ORDER — ENOXAPARIN SODIUM 100 MG/ML
40 INJECTION SUBCUTANEOUS DAILY
Qty: 0 | Refills: 0 | Status: DISCONTINUED | OUTPATIENT
Start: 2018-05-24 | End: 2018-05-25

## 2018-05-24 RX ORDER — ONDANSETRON 8 MG/1
4 TABLET, FILM COATED ORAL ONCE
Qty: 0 | Refills: 0 | Status: COMPLETED | OUTPATIENT
Start: 2018-05-24 | End: 2018-05-24

## 2018-05-24 RX ORDER — SODIUM CHLORIDE 9 MG/ML
1000 INJECTION INTRAMUSCULAR; INTRAVENOUS; SUBCUTANEOUS
Qty: 0 | Refills: 0 | Status: DISCONTINUED | OUTPATIENT
Start: 2018-05-24 | End: 2018-05-25

## 2018-05-24 RX ORDER — CIPROFLOXACIN LACTATE 400MG/40ML
400 VIAL (ML) INTRAVENOUS ONCE
Qty: 0 | Refills: 0 | Status: COMPLETED | OUTPATIENT
Start: 2018-05-24 | End: 2018-05-24

## 2018-05-24 RX ORDER — ERTAPENEM SODIUM 1 G/1
1000 INJECTION, POWDER, LYOPHILIZED, FOR SOLUTION INTRAMUSCULAR; INTRAVENOUS EVERY 24 HOURS
Qty: 0 | Refills: 0 | Status: DISCONTINUED | OUTPATIENT
Start: 2018-05-24 | End: 2018-05-25

## 2018-05-24 RX ORDER — ACETAMINOPHEN 500 MG
650 TABLET ORAL ONCE
Qty: 0 | Refills: 0 | Status: COMPLETED | OUTPATIENT
Start: 2018-05-24 | End: 2018-05-24

## 2018-05-24 RX ORDER — CLOPIDOGREL BISULFATE 75 MG/1
75 TABLET, FILM COATED ORAL DAILY
Qty: 0 | Refills: 0 | Status: DISCONTINUED | OUTPATIENT
Start: 2018-05-24 | End: 2018-05-25

## 2018-05-24 RX ORDER — DIPHENHYDRAMINE HCL 50 MG
25 CAPSULE ORAL EVERY 4 HOURS
Qty: 0 | Refills: 0 | Status: DISCONTINUED | OUTPATIENT
Start: 2018-05-24 | End: 2018-05-25

## 2018-05-24 RX ORDER — FAMOTIDINE 10 MG/ML
20 INJECTION INTRAVENOUS DAILY
Qty: 0 | Refills: 0 | Status: DISCONTINUED | OUTPATIENT
Start: 2018-05-24 | End: 2018-05-25

## 2018-05-24 RX ORDER — METOPROLOL TARTRATE 50 MG
25 TABLET ORAL DAILY
Qty: 0 | Refills: 0 | Status: DISCONTINUED | OUTPATIENT
Start: 2018-05-24 | End: 2018-05-25

## 2018-05-24 RX ORDER — SIMVASTATIN 20 MG/1
20 TABLET, FILM COATED ORAL AT BEDTIME
Qty: 0 | Refills: 0 | Status: DISCONTINUED | OUTPATIENT
Start: 2018-05-24 | End: 2018-05-25

## 2018-05-24 RX ORDER — ACETAMINOPHEN 500 MG
650 TABLET ORAL EVERY 6 HOURS
Qty: 0 | Refills: 0 | Status: DISCONTINUED | OUTPATIENT
Start: 2018-05-24 | End: 2018-05-25

## 2018-05-24 RX ORDER — IBUPROFEN 200 MG
400 TABLET ORAL EVERY 6 HOURS
Qty: 0 | Refills: 0 | Status: DISCONTINUED | OUTPATIENT
Start: 2018-05-24 | End: 2018-05-25

## 2018-05-24 RX ORDER — LISINOPRIL 2.5 MG/1
20 TABLET ORAL DAILY
Qty: 0 | Refills: 0 | Status: DISCONTINUED | OUTPATIENT
Start: 2018-05-24 | End: 2018-05-25

## 2018-05-24 RX ADMIN — SODIUM CHLORIDE 1000 MILLILITER(S): 9 INJECTION INTRAMUSCULAR; INTRAVENOUS; SUBCUTANEOUS at 01:50

## 2018-05-24 RX ADMIN — TRAMADOL HYDROCHLORIDE 25 MILLIGRAM(S): 50 TABLET ORAL at 03:51

## 2018-05-24 RX ADMIN — Medication 650 MILLIGRAM(S): at 01:50

## 2018-05-24 RX ADMIN — Medication 400 MILLIGRAM(S): at 22:13

## 2018-05-24 RX ADMIN — ONDANSETRON 4 MILLIGRAM(S): 8 TABLET, FILM COATED ORAL at 16:53

## 2018-05-24 RX ADMIN — ONDANSETRON 4 MILLIGRAM(S): 8 TABLET, FILM COATED ORAL at 08:49

## 2018-05-24 RX ADMIN — Medication 400 MILLIGRAM(S): at 15:30

## 2018-05-24 RX ADMIN — Medication 100 MILLIGRAM(S): at 16:53

## 2018-05-24 RX ADMIN — SIMVASTATIN 20 MILLIGRAM(S): 20 TABLET, FILM COATED ORAL at 22:13

## 2018-05-24 RX ADMIN — Medication 200 MILLIGRAM(S): at 12:56

## 2018-05-24 RX ADMIN — SODIUM CHLORIDE 75 MILLILITER(S): 9 INJECTION INTRAMUSCULAR; INTRAVENOUS; SUBCUTANEOUS at 03:50

## 2018-05-24 RX ADMIN — SODIUM CHLORIDE 75 MILLILITER(S): 9 INJECTION INTRAMUSCULAR; INTRAVENOUS; SUBCUTANEOUS at 22:13

## 2018-05-24 RX ADMIN — Medication 400 MILLIGRAM(S): at 14:19

## 2018-05-24 RX ADMIN — ONDANSETRON 4 MILLIGRAM(S): 8 TABLET, FILM COATED ORAL at 01:50

## 2018-05-24 RX ADMIN — Medication 400 MILLIGRAM(S): at 23:13

## 2018-05-24 RX ADMIN — Medication 100 MILLIGRAM(S): at 09:36

## 2018-05-24 RX ADMIN — Medication 100 MILLIGRAM(S): at 03:50

## 2018-05-24 RX ADMIN — Medication 650 MILLIGRAM(S): at 09:37

## 2018-05-24 RX ADMIN — ENOXAPARIN SODIUM 40 MILLIGRAM(S): 100 INJECTION SUBCUTANEOUS at 12:55

## 2018-05-24 RX ADMIN — Medication 200 MILLIGRAM(S): at 01:51

## 2018-05-24 RX ADMIN — Medication 81 MILLIGRAM(S): at 12:55

## 2018-05-24 RX ADMIN — TRAMADOL HYDROCHLORIDE 25 MILLIGRAM(S): 50 TABLET ORAL at 04:45

## 2018-05-24 RX ADMIN — CLOPIDOGREL BISULFATE 75 MILLIGRAM(S): 75 TABLET, FILM COATED ORAL at 12:55

## 2018-05-24 RX ADMIN — Medication 650 MILLIGRAM(S): at 08:21

## 2018-05-24 RX ADMIN — FAMOTIDINE 20 MILLIGRAM(S): 10 INJECTION INTRAVENOUS at 12:55

## 2018-05-24 NOTE — PATIENT PROFILE ADULT. - NS TRANSFER PATIENT BELONGINGS
Cell Phone/PDA (specify)/Clothing Clothing/Jewelry/Money (specify)/Cell Phone/PDA (specify)/silver colored ring with stone, earrings, bellybuuton ring

## 2018-05-24 NOTE — H&P ADULT - PROBLEM SELECTOR PLAN 1
Sepsis 2/2 to Acute sigmoid diverticulitis  Admit to [facility]  S/p Cipro/Flagyl in Ed. Patient developed rash during last ten minutes of Cipro IV while in ED. Cipro stopped, line flushed with saline. Hold off on further antibiotics at this time.  Zofran PRN  Pain control  Follow up blood cultures, AM labs, monitor vitals  NPO for now, will give IVF while NPO  Fall precautions  OOB with assistance Sepsis 2/2 to Acute sigmoid diverticulitis  Admit to [facility]  S/p Cipro/Flagyl in Ed. Patient developed rash during last ten minutes of Cipro IV while in ED. Cipro stopped, line flushed with saline. Hold off on further antibiotics at this time.  Zofran PRN  Follow up blood cultures, AM labs, monitor vitals  Clear liquid diet  IVF NS @ 75 cc/hr   Fall precautions  OOB with assistance Sepsis 2/2 to Acute sigmoid diverticulitis  Admit to [facility]  S/p Cipro/Flagyl in Ed. Patient developed rash during last ten minutes of Cipro IV while in ED. Cipro stopped, line flushed with saline. Patient has received cipro/flagyl in past, unlikely cause for rash, will continue abx  Zofran PRN  Follow up blood cultures, AM labs, monitor vitals  Clear liquid diet  IVF NS @ 75 cc/hr   Fall precautions  OOB with assistance CT  A/P showed Uncomplicated Diverticulitis. recurrent   Clear liquid Diet,  IV Fluids NS  Iv Flagyl 500 mg q 8 hrs, IV Cipro 400 mg q 12 hrs  PRN Zofran for Nausea.  Pt is Afebrile, Normal WBC  GI Dr Dumas consult called

## 2018-05-24 NOTE — CHART NOTE - NSCHARTNOTEFT_GEN_A_CORE
Ileana Gillis  - 1955    mr [id number]    case discussed with Dr Mcnally  63 f with abd pain, nausea not tolerating oral  saw her GI and got cipro flagyl and CT scan which showed improving diverticulitis  normal wbc and afebrile    but given symptoms and pt feels that flagyl is contributing to her nausea  she states pcn allergy to rash      will give ertapenem and d/c cipro and flagyl.      Michelle Hyatt MD

## 2018-05-24 NOTE — H&P ADULT - PROBLEM SELECTOR PLAN 2
See above Likely 2/2 to caffein withdrawal HA,  ?/ 2/2 dehydration. Neurologically intact.  s/p 1 dose Tylenol. Headache only improved slightly.  Will give Ultram 1 dose  IVF to continue

## 2018-05-24 NOTE — PROGRESS NOTE ADULT - PROBLEM SELECTOR PLAN 3
Likely 2/2 to acute illness/dehydration. Neurologically intact.  s/p 1 dose Tylenol. Headache only improved slightly  -Motrin 400mg q6 PRN for pain. Monitor BUN/Cr Continue statin and H2 blocker

## 2018-05-24 NOTE — H&P ADULT - PROBLEM SELECTOR PLAN 3
Likely 2/2 to acute illness/dehydration. Neurologically intact.  s/p 1 dose Tylenol. Headache only improved slightly.  Will give 1 dose Toradol IV   IVF  Consider imaging if headache does not improve. Likely 2/2 to acute illness/dehydration. Neurologically intact.  s/p 1 dose Tylenol. Headache only improved slightly.  Will give 1 Percocet now  IVF  Consider imaging if headache does not improve Likely 2/2 to acute illness/dehydration. Neurologically intact.  s/p 1 dose Tylenol. Headache only improved slightly.  Will give Ultram 1 dose  IVF  Consider imaging if headache does not improve CAD s/p 2 stents  Continue ASA and Plavix, BB, Statin

## 2018-05-24 NOTE — H&P ADULT - FAMILY HISTORY
Father  Still living? No  Family history of heart disease, Age at diagnosis: Age Unknown  Family history of prostate cancer in father, Age at diagnosis: Age Unknown

## 2018-05-24 NOTE — PROVIDER CONTACT NOTE (OTHER) - RECOMMENDATIONS
MD Tejeda made aware at this time that cipro was completed without further reaction and no benadryl given.

## 2018-05-24 NOTE — H&P ADULT - PROBLEM SELECTOR PLAN 8
Continue statin and H2 blocker Lovenox 40 SC daily for DVT ppx    IMPROVE VTE Individual Risk Assessment          RISK                                                          Points  [  ] Previous VTE                                                3  [  ] Thrombophilia                                             2  [  ] Lower limb paralysis                                   2        (unable to hold up >15 seconds)    [  ] Current Cancer                                             2         (within 6 months)  [  ] Immobilization > 24 hrs                              1  [  ] ICU/CCU stay > 24 hours                             1  [ x ] Age > 60                                                         1    IMPROVE VTE Score: 1

## 2018-05-24 NOTE — H&P ADULT - PROBLEM SELECTOR PLAN 9
Lovenox 40 SC daily for DVT ppx    IMPROVE VTE Individual Risk Assessment          RISK                                                          Points  [  ] Previous VTE                                                3  [  ] Thrombophilia                                             2  [  ] Lower limb paralysis                                   2        (unable to hold up >15 seconds)    [  ] Current Cancer                                             2         (within 6 months)  [  ] Immobilization > 24 hrs                              1  [  ] ICU/CCU stay > 24 hours                             1  [ x ] Age > 60                                                         1    IMPROVE VTE Score: 1

## 2018-05-24 NOTE — H&P ADULT - PROBLEM SELECTOR PLAN 7
Continue Toprol XL with hold parameters Acute erythematous non papular rash of chest, possibly secondary to drug interaction, although patient has received cipro/flagyl in past. Patient HD stable. Will continue antibiotics. Patient denies SOB.  Will give 25 mg PO Benadryl q4hr PRN rash/itching

## 2018-05-24 NOTE — ED ADULT TRIAGE NOTE - CHIEF COMPLAINT QUOTE
patient treated at home for diverticulitis with cipro and flagyl, c/o abd pain/nausea from antibiotics

## 2018-05-24 NOTE — H&P ADULT - NSHPREVIEWOFSYSTEMS_GEN_ALL_CORE
CONSTITUTIONAL: + fevers, weakness. No chills  EYES/ENT: No visual changes;  No vertigo or throat pain   NECK: No pain or stiffness  RESPIRATORY: No cough, wheezing, hemoptysis; No shortness of breath  CARDIOVASCULAR: No chest pain or palpitations  GASTROINTESTINAL: + abdominal pain, nausea. No epigastric pain. No vomiting, or hematemesis; No diarrhea or constipation. No melena or hematochezia.  GENITOURINARY: No dysuria, frequency or hematuria  NEUROLOGICAL: + headache. No numbness or weakness  SKIN: + chest rash. No itching, burning, or lesions   All other review of systems is negative unless indicated above.

## 2018-05-24 NOTE — H&P ADULT - ASSESSMENT
Patient is a 63 year old Female with PMHx of Hx NSTEMI IN 2012, CAD s/p 1 stent in LAD 2007 and 1 stent in Ramus 2012, HTN, HLD, cervical cancer, hx of episode of Diverticulitis in 02/2018, presents to the Ed due to lower abdominal pain/pressure rated 5/10 nonradiating, weakness, nausea since this morning. She recently saw her GI Dr. Fregoso and was sent for Ct abdomen pelvis which showed uncomplicated sigmoid diverticulitis, admit for Sepsis secondary to acute sigmoid diverticulitis. Patient is a 63 year old Female with PMHx of Hx NSTEMI IN 2012, CAD s/p 1 stent in LAD 2007 and 1 stent in Ramus 2012, HTN, HLD, cervical cancer, hx of episode of Diverticulitis in 02/2018, presents to the Ed due to lower abdominal pain/pressure rated 5/10 nonradiating, weakness, nausea since this morning. She recently saw her GI Dr. Fregoso and was sent for Ct abdomen pelvis which showed uncomplicated sigmoid diverticulitis, admit for Sepsis secondary to acute sigmoid diverticulitis, acute headache, acute rash of chest. Patient is a 63 year old Female with PMHx of Hx NSTEMI IN 2012, CAD s/p 1 stent in LAD 2007 and 1 stent in [place] 2012, HTN, HLD, cervical cancer, hx of episode of Diverticulitis in 02/2018, presents to the Ed due to lower abdominal pain/pressure rated 5/10 nonradiating, weakness, nausea since this morning. She recently saw her GI Dr. Fregoso and was sent for Ct abdomen pelvis which showed uncomplicated sigmoid diverticulitis, admit for  acute sigmoid diverticulitis, & inability to tolerate Po diet, & Oral antibiotics, headache,

## 2018-05-24 NOTE — PROGRESS NOTE ADULT - PROBLEM SELECTOR PLAN 1
Sepsis 2/2 to Acute sigmoid diverticulitis. WBC normal. No fever overnight.  -Continue IV Cipro/Flagyl   -Zofran PRN for nausea  -Follow up blood cultures x2  -Dr. Fregoso, GI, consulted   -Diet advanced to full liquid diet  -D/C IVF, patient tolerating diet Uncomplicated diverticulitis, -Continue IV Cipro/Flagyl   -Zofran PRN for nausea  -Follow up blood cultures x2  -Dr. Fregoso, GI, consulted   -Diet advanced to full liquid diet  -continue  IVF, patient not  tolerating diet

## 2018-05-24 NOTE — PROGRESS NOTE ADULT - PROBLEM SELECTOR PLAN 7
Continue Toprol XL with hold parameters Acute erythematous non papular rash of chest, possibly secondary to ?drug interaction. Patient has used same abx in the past.   -Erythema improved. Continue PO Benadryl q4hr PRN rash/itching

## 2018-05-24 NOTE — ED ADULT NURSE REASSESSMENT NOTE - NS ED NURSE REASSESS COMMENT FT1
RTM timer clicked at this time. Awaiting bed assignment. Patient resting comfortably. Call bell in reach, safety maintained.
Received patient from Alicia ANNE. Patient alert and oriented. Vital signs as documented. Patient complains of headache, medicated as per MD order. IV fluids infusing, IV antibiotic administered. Awaiting bed assignment. Needs met, call bell in reach. Safety maintained.

## 2018-05-24 NOTE — ED ADULT NURSE NOTE - OBJECTIVE STATEMENT
patient came in ED from home BIBA with c/o lower abdominal pain and headache. febrile on arrival 100.8F patient states she had CT done and received a call today that she have Diverticulitis. alert and oriented X 4. denies nausea and vomiting. patient added she was started on Cipro and Flagyl PO.

## 2018-05-24 NOTE — H&P ADULT - NSHPSOCIALHISTORY_GEN_ALL_CORE
Social History/Preventive Medicine:    Marital Status:   Occupation: medical records for [employer]  Lives with: kraig  Ambulates at home: yes    Substance Use:  Tobacco Usage:  quit smoking 30 years ago, social smoker for a few years  Alcohol Usage: wine with dinner on the weekends  Illicit Drug Usage: denies    Health Management     Last Physical Exam: 2018  Last Mammo:  2017  Last Pap: 2018        Immunization Hx: Flu 2017    Advanced Directives: Full code

## 2018-05-24 NOTE — PROVIDER CONTACT NOTE (OTHER) - ACTION/TREATMENT ORDERED:
As per MD Tejeda, "2 [facility] RN to speak with MD Mcnally directly about situation because this was already spoken about". OK to given cipro moving forward.

## 2018-05-24 NOTE — PROGRESS NOTE ADULT - PROBLEM SELECTOR PLAN 4
As seen on EKG  Careful administration of zofran/reglan  Repeat EKG tomorrow Continue Toprol XL with hold parameters BP was low this AM.Continue Toprol XL with hold parameters

## 2018-05-24 NOTE — PROVIDER CONTACT NOTE (OTHER) - SITUATION
Report given to DAYANA vaughn. RN noted H&P documentation that cipro was stopped due to potential allergy. In fact cipro started then mild redness noted to patients chest but cipro completed.

## 2018-05-24 NOTE — PROGRESS NOTE ADULT - PROBLEM SELECTOR PROBLEM 2
Diverticulitis Coronary artery disease, angina presence unspecified, unspecified vessel or lesion type, unspecified whether native or transplanted heart

## 2018-05-24 NOTE — PROGRESS NOTE ADULT - PROBLEM SELECTOR PLAN 6
CAD s/p 2 stents  Continue ASA and Plavix As seen on EKG  Careful administration of zofran/reglan  Repeat EKG tomorrow

## 2018-05-24 NOTE — PROGRESS NOTE ADULT - ASSESSMENT
Patient is a 63 year old Female with PMHx of Hx NSTEMI IN 2012, CAD s/p 1 stent in LAD 2007 and 1 stent in Ramus 2012, HTN, HLD, cervical cancer, hx of episode of Diverticulitis in 02/2018, presents to the Ed due to lower abdominal pain/pressure rated 5/10 nonradiating, weakness, nausea since this morning. She recently saw her GI Dr. Fregoso and was sent for Ct abdomen pelvis which showed uncomplicated sigmoid diverticulitis, admit for Sepsis secondary to acute sigmoid diverticulitis, acute headache, acute rash of chest. Patient is a 63 year old Female with PMHx of Hx NSTEMI IN 2012, CAD s/p 1 stent in LAD 2007 and 1 stent in [place] 2012, HTN, HLD, cervical cancer, hx of episode of Diverticulitis in 02/2018, presents to the Ed due to lower abdominal pain/pressure rated 5/10 nonradiating, weakness, nausea since this morning. She recently saw her GI Dr. Fregoso and was sent for Ct abdomen pelvis which showed uncomplicated sigmoid diverticulitis ,pt is admitted for  acute sigmoid diverticulitis with poor Po intake & inability to tolerate oral Antibiotics & Diet, acute headache,

## 2018-05-24 NOTE — H&P ADULT - PROBLEM SELECTOR PLAN 4
As seen on EKG  Careful administration of zofran/reglan As seen on EKG  Careful administration of zofran/reglan  Repeat EKG tomorrow Continue Toprol XL with hold parameters

## 2018-05-24 NOTE — H&P ADULT - NSHPPHYSICALEXAM_GEN_ALL_CORE
Physical Exam:  General: Well developed, well nourished, NAD  HEENT: NCAT, PERRLA, EOMI bl, moist mucous membranes   Neck: Supple, nontender, no mass  Neurology: A&Ox3, nonfocal, CN II-XII grossly intact, sensation intact, no gait abnormalities   Respiratory: CTA B/L, No W/R/R  CV: RRR, +S1/S2, no murmurs, rubs or gallops  Abdominal: Soft, mild tenderness bilateral lower quadrants, ND +BSx4  Extremities: No C/C/E, + peripheral pulses  MSK: Normal ROM, no joint erythema or warmth, no joint swelling   Skin: warm, dry, normal color, no rash or abnormal lesions Physical Exam:  General: Well developed, well nourished, NAD  HEENT: NCAT, PERRLA, EOMI bl, moist mucous membranes   Neck: Supple, nontender, no mass  Neurology: A&Ox3, nonfocal, CN II-XII grossly intact, sensation intact, no gait abnormalities   Respiratory: CTA B/L, No W/R/R  CV: RRR, +S1/S2, no murmurs, rubs or gallops  Abdominal: Soft, mod. tenderness bilateral lower quadrants, ND +BSx4  Extremities: No C/C/E, + peripheral pulses  MSK: Normal ROM, no joint erythema or warmth, no joint swelling   Skin: warm, dry, normal color, + chest erythematous rash

## 2018-05-24 NOTE — H&P ADULT - GASTROINTESTINAL DETAILS
no guarding/bowel sounds normal/no bruit/no rigidity/no distention/nontender/no masses palpable/normal/soreness lower abdomen./soft/no organomegaly

## 2018-05-24 NOTE — H&P ADULT - HISTORY OF PRESENT ILLNESS
Patient is a 63 year old Female with PMHx of Hx NSTEMI IN 2012, CAD s/p 1 stent in LAD 2007 and 1 stent in Ramus 2012, HTN, HLD, cervical cancer, hx of episode of Diverticulitis in 02/2018, presents to the Ed due to lower abdominal pain/pressure rated 5/10 nonradiating, weakness, nausea since this morning. She recently saw her GI Dr. Fregoso and was sent for Ct abdomen pelvis which showed uncomplicated sigmoid diverticulitis. She was given PO Cipro/Flagyl but could not tolerate the medications, they made her very nauseas and weak. She reports she has had 1 bout of diverticulitis in the past, occurred 3 months ago in 02/2018. She denies recent travel or sick contacts. She also reports headache. She denies dizziness, chest pain, palpitations, SOB, vomiting, diarrhea.     In the Ed, patient had temp 100.8, hr 96, /81, RR 16, Spo2 96% Ra. CBC, PT/INR, Lactate normal, Lipase and CMP within normal limits. CT abd/pelvic with oral contrast showed mild sigmoid diverticulitis, improved from prior. EKG showed sinus rhythm, left anterior fascicular block, prolonged , QTc 496, VR 91. Patient received 1 dose IV Flagyl, 1 L NS, Tylenol, Zofran. Blood cultures x 2 drawn. Patient was receiving dose of IV Cipro, ran over one hour, when there was 10 minutes remaining patient developed red rash on her chest, non-itchy, nonpainful. Patient denies shortness of breath. The Cipro was paused for the last ten minutes and not given the remainder. Patient is a 63 year old Female with PMHx of Hx NSTEMI IN 2012, CAD s/p 1 stent in LAD 2007 and 1 stent in Ramus 2012, HTN, HLD, cervical cancer, hx of episode of Diverticulitis in 02/2018, presents to the Ed due to lower abdominal pain/pressure rated 5/10 nonradiating, weakness, nausea since this morning. She recently saw her GI Dr. Fregoso and was sent for Ct abdomen pelvis which showed uncomplicated sigmoid diverticulitis. She was given PO Cipro/Flagyl but could not tolerate the medications, they made her very nauseas and weak. She reports she has had 1 bout of diverticulitis in the past, occurred 3 months ago in 02/2018. She denies recent travel or sick contacts. She also reports headache moderate-severe in intensity, reports she had the same headache the last time she had diverticulitis. She denies dizziness, blurred vision, slurred speech, chest pain, palpitations, SOB, vomiting, diarrhea.     In the Ed, patient had temp 100.8, hr 96, /81, RR 16, Spo2 96% Ra. CBC, PT/INR, Lactate normal, Lipase and CMP within normal limits. CT abd/pelvic with oral contrast showed mild sigmoid diverticulitis, improved from prior. EKG showed sinus rhythm, left anterior fascicular block, prolonged , QTc 496, VR 91. Patient received 1 dose IV Flagyl, 1 L NS, Tylenol, Zofran. Blood cultures x 2 drawn. Patient was receiving dose of IV Cipro, ran over one hour, when there was 10 minutes remaining patient developed red rash on her chest, non-itchy, nonpainful. Patient denies shortness of breath. The Cipro was paused for the last ten minutes and not given the remainder. Patient is a 63 year old Female with PMHx of Hx NSTEMI IN 2012, CAD s/p 1 stent in LAD 2007 and 1 stent in Ramus 2012, HTN, HLD, cervical cancer, hx of episode of Diverticulitis in 02/2018, presents to the Ed due to lower abdominal pain/pressure rated 5/10 nonradiating, weakness, nausea since this morning. She recently saw her GI Dr. Fregoso and was sent for Ct abdomen pelvis which showed uncomplicated sigmoid diverticulitis. She was given PO Cipro/Flagyl but could not tolerate the medications, they made her very nauseas and weak. She reports she has had 1 bout of diverticulitis in the past, occurred 3 months ago in 02/2018. She denies recent travel or sick contacts. She also reports headache moderate-severe in intensity, reports she had the same headache the last time she had diverticulitis. She denies dizziness, blurred vision, slurred speech, chest pain, palpitations, SOB, vomiting, diarrhea.     In the Ed, patient had temp 100.8, hr 96, /81, RR 16, Spo2 96% Ra. CBC, PT/INR, Lactate normal, Lipase and CMP within normal limits. CT abd/pelvic with oral contrast showed mild sigmoid diverticulitis, improved from prior. EKG showed sinus rhythm, left anterior fascicular block, prolonged , QTc 496, VR 91. Patient received 1 dose IV Flagyl, 1 L NS, Tylenol, Zofran. Blood cultures x 2 drawn. Patient was receiving dose of IV Cipro, ran over one hour, when there was 10 minutes remaining patient developed red rash on her chest, non-itchy, nonpainful. Patient denies shortness of breath. The Cipro was paused for the last ten minutes and not given the remainder.   Pt is NOT able to tolerate PO antibiotics & unable to take po food &  fluids.

## 2018-05-24 NOTE — H&P ADULT - PROBLEM SELECTOR PROBLEM 3
Acute nonintractable headache, unspecified headache type Coronary artery disease, angina presence unspecified, unspecified vessel or lesion type, unspecified whether native or transplanted heart

## 2018-05-24 NOTE — PROGRESS NOTE ADULT - SUBJECTIVE AND OBJECTIVE BOX
Patient is a 63y old  Female who presents with a chief complaint of Diverticulitis (24 May 2018 02:32)      INTERVAL HPI: Patient seen and examined at bedside. Reports improvement in weakness after IVF. States she continues to feel nauseous. Erythematous rash around upper chest has improved. Admits to continued headache.     OVERNIGHT EVENTS:    Home Medications:  aspirin 81 mg oral tablet: 1 tab(s) orally once a day (08 Mar 2018 07:51)  lisinopril 20 mg oral tablet: 1 tab(s) orally once a day (08 Mar 2018 07:51)  Plavix 75 mg oral tablet: 1 tab(s) orally once a day (08 Mar 2018 07:51)  simvastatin 20 mg oral tablet: 1 tab(s) orally once a day (at bedtime) (08 Mar 2018 07:51)  Singulair 10 mg oral tablet: 1 tab(s) orally once a day (24 May 2018 02:52)  Toprol-XL 25 mg oral tablet, extended release: 1 tab(s) orally once a day (08 Mar 2018 07:51)  Zantac 150 oral tablet: 1 tab(s) orally 2 times a day (08 Mar 2018 07:51)  Zetia 10 mg oral tablet: 1 tab(s) orally once a day (08 Mar 2018 07:51)      MEDICATIONS  (STANDING):  aspirin enteric coated 81 milliGRAM(s) Oral daily  ciprofloxacin   IVPB 400 milliGRAM(s) IV Intermittent every 12 hours  clopidogrel Tablet 75 milliGRAM(s) Oral daily  enoxaparin Injectable 40 milliGRAM(s) SubCutaneous daily  famotidine    Tablet 20 milliGRAM(s) Oral daily  lisinopril 20 milliGRAM(s) Oral daily  metoprolol succinate ER 25 milliGRAM(s) Oral daily  metroNIDAZOLE  IVPB 500 milliGRAM(s) IV Intermittent every 8 hours  simvastatin 20 milliGRAM(s) Oral at bedtime    MEDICATIONS  (PRN):  acetaminophen   Tablet. 650 milliGRAM(s) Oral every 6 hours PRN Mild Pain (1 - 3)  diphenhydrAMINE   Capsule 25 milliGRAM(s) Oral every 4 hours PRN Rash and/or Itching  ondansetron   Disintegrating Tablet 4 milliGRAM(s) Oral every 8 hours PRN Nausea and/or Vomiting      Allergies    codeine (Vomiting)  penicillin (Rash)    Intolerances      REVIEW OF SYSTEMS:  CONSTITUTIONAL: No fever, No chills, No Body ache, No Weakness  EYES: No eye pain,  No visual disturbances, No discharge, NO Redness  ENMT:  No ear pain; No sinus or throat pain, No Congestion  NECK: No pain, No stiffness  RESPIRATORY: No cough, wheezing, No hemoptysis, No shortness of breath  CARDIOVASCULAR: No chest pain, palpitations  GASTROINTESTINAL: No abdominal or epigastric pain. No nausea, No vomiting; No diarrhea or constipation. [  ] BM  GENITOURINARY: No dysuria, No frequency, No urgency, No hematuria, or incontinence  NEUROLOGICAL: positive headaches, No dizziness, No numbness, No tingling, No tremors, No weakness  EXT: No Swelling, No Pain, No Edema  SKIN: upper chest/lower neck rash  MUSCULOSKELETAL: No joint pain or swelling; No muscle pain, No back pain, No extremity pain  PSYCHIATRIC: No depression, anxiety, mood swings or difficulty sleeping at night  PAIN SCALE: [  ] None  [x  ] Other- headache 8/10  ROS Unable to obtain due to - [  ] Dementia  [  ] Lethargy  [  ] Sedated [  ] non verbal  REST OF REVIEW Of SYSTEM - [  ] Normal     Vital Signs Last 24 Hrs  T(C): 36.8 (24 May 2018 07:44), Max: 38.2 (24 May 2018 00:14)  T(F): 98.3 (24 May 2018 07:44), Max: 100.8 (24 May 2018 00:14)  HR: 76 (24 May 2018 07:44) (74 - 96)  BP: 124/75 (24 May 2018 07:44) (98/60 - 140/80)  BP(mean): --  RR: 16 (24 May 2018 07:44) (15 - 16)  SpO2: 97% (24 May 2018 07:44) (96% - 99%)  Finger Stick      05-24 @ 07:01  -  05-24 @ 10:35  --------------------------------------------------------  IN: 380 mL / OUT: 200 mL / NET: 180 mL      PHYSICAL EXAM:  GENERAL:  [x  ] NAD , [ x] well appearing, [  ] Agitated, [  ] Drowsy,  [  ] Lethargy, [  ] confused   HEAD:  [x  ] Normal, [  ] Other  EYES:  [ x ] EOMI, [ x ] PERRLA, [x  ] conjunctiva and sclera clear normal, [  ] Other,  [  ] Pallor,[  ] Discharge  ENMT:  [x  ] Normal, [ x ] Moist mucous membranes, [ x ] Good dentition, [x  ] No Thrush  NECK:  [  x] Supple, [  ] No JVD, [  ] Normal thyroid, [  ] Lymphadenopathy [  ] Other  CHEST/LUNG:  [x  ] Clear to auscultation bilaterally, [  ] Breath Sounds equal OR Decrease,  [x  ] No rales, [x  ] No rhonchi  [x  ]  No wheezing  HEART:  [ x ] Regular rate and rhythm, [  ] tachycardia, [  ] Bradycardia,  [  ] irregular  [ x ] No murmurs, No rubs, No gallops, [  ] PPM in place (Mfr:  )  ABDOMEN:  [x  ] Soft, [x  ] mild tenderness to deep palpation in LLQ, [ x ] Nondistended, [ x ] No mass, [ x ] Bowel sounds present, [  ] obese  NERVOUS SYSTEM:  [ x ] Alert & Oriented X3, [x ] Nonfocal  [  ] Confusion  [  ] Encephalopathic [  ] Sedated [  ] Unable to assess, [  ] Other-  EXTREMITIES: [x  ] 2+ Peripheral Pulses, No clubbing, No cyanosis,  [  ] edema B/L lower EXT. [  ] PVD stasis skin changes B/L Lower EXT  LYMPH: No lymphadenopathy noted  SKIN:  mild erythema in right chest/lower neck    DIET: full liquid    LABS:                        14.2   7.93  )-----------( 241      ( 24 May 2018 01:50 )             41.4     24 May 2018 01:50    137    |  102    |  10     ----------------------------<  131    3.6     |  26     |  0.76     Ca    8.3        24 May 2018 01:50    TPro  7.6    /  Alb  3.5    /  TBili  0.3    /  DBili  x      /  AST  28     /  ALT  37     /  AlkPhos  82     24 May 2018 01:50    PT/INR - ( 24 May 2018 01:50 )   PT: 12.4 sec;   INR: 1.13 ratio         PTT - ( 24 May 2018 01:50 )  PTT:27.8 sec      Anemia Panel:      Thyroid Panel:        Lipase, Serum: 108 U/L (05-24-18 @ 01:50)        RADIOLOGY & ADDITIONAL TESTS:      HEALTH ISSUES - PROBLEM Dx:  Need for prophylactic measure: Need for prophylactic measure  Hypercholesteremia: Hypercholesteremia  Essential hypertension: Essential hypertension  Coronary artery disease, angina presence unspecified, unspecified vessel or lesion type, unspecified whether native or transplanted heart: Coronary artery disease, angina presence unspecified, unspecified vessel or lesion type, unspecified whether native or transplanted heart  Rash: Rash  Prolonged QT interval: Prolonged QT interval  Acute nonintractable headache, unspecified headache type: Acute nonintractable headache, unspecified headache type  Diverticulitis: Diverticulitis  Sepsis, due to unspecified organism: Sepsis, due to unspecified organism          Consultant(s) Notes Reviewed:  [x  ] YES     Care Discussed with [X] Consultants  [ x ] Patient  [  ] Family  [  ]   [  ] Social Service  [  ] RN, [  ] Physical Therapy  DVT PPX: [ x ] Lovenox, [  ] S C Heparin, [  ] Coumadin, [  ] Xarelto, [  ] Eliquis, [  ] Pradaxa, [  ] IV Heparin drip, [  ] SCD [  ] Contraindication 2 to GI Bleed,[  ] Ambulation  Advanced directive: [  ] None, [  ] DNR/DNI Patient is a 63y old  Female who presents with a chief complaint of Diverticulitis (24 May 2018 02:32)      INTERVAL HPI: Patient seen and examined at bedside. Reports improvement in weakness after IVF. States she continues to feel  weak, nauseous. Erythematous rash around upper chest has improved. Admits to continued headache. Pt states Po Flagyl makes her very sick, had similar experience in past, this time it is worse,unable to eat anything.    OVERNIGHT EVENTS:   NONE  Home Medications:  aspirin 81 mg oral tablet: 1 tab(s) orally once a day (08 Mar 2018 07:51)  lisinopril 20 mg oral tablet: 1 tab(s) orally once a day (08 Mar 2018 07:51)  Plavix 75 mg oral tablet: 1 tab(s) orally once a day (08 Mar 2018 07:51)  simvastatin 20 mg oral tablet: 1 tab(s) orally once a day (at bedtime) (08 Mar 2018 07:51)  Singulair 10 mg oral tablet: 1 tab(s) orally once a day (24 May 2018 02:52)  Toprol-XL 25 mg oral tablet, extended release: 1 tab(s) orally once a day (08 Mar 2018 07:51)  Zantac 150 oral tablet: 1 tab(s) orally 2 times a day (08 Mar 2018 07:51)  Zetia 10 mg oral tablet: 1 tab(s) orally once a day (08 Mar 2018 07:51)      MEDICATIONS  (STANDING):  aspirin enteric coated 81 milliGRAM(s) Oral daily  ciprofloxacin   IVPB 400 milliGRAM(s) IV Intermittent every 12 hours  clopidogrel Tablet 75 milliGRAM(s) Oral daily  enoxaparin Injectable 40 milliGRAM(s) SubCutaneous daily  famotidine    Tablet 20 milliGRAM(s) Oral daily  lisinopril 20 milliGRAM(s) Oral daily  metoprolol succinate ER 25 milliGRAM(s) Oral daily  metroNIDAZOLE  IVPB 500 milliGRAM(s) IV Intermittent every 8 hours  simvastatin 20 milliGRAM(s) Oral at bedtime    MEDICATIONS  (PRN):  acetaminophen   Tablet. 650 milliGRAM(s) Oral every 6 hours PRN Mild Pain (1 - 3)  diphenhydrAMINE   Capsule 25 milliGRAM(s) Oral every 4 hours PRN Rash and/or Itching  ondansetron   Disintegrating Tablet 4 milliGRAM(s) Oral every 8 hours PRN Nausea and/or Vomiting      Allergies    codeine (Vomiting)  penicillin (Rash)    Intolerances      REVIEW OF SYSTEMS: No appetite  CONSTITUTIONAL: No fever, No chills, No Body ache, No Weakness  EYES: No eye pain,  No visual disturbances, No discharge, NO Redness  ENMT:  No ear pain; No sinus or throat pain, No Congestion  NECK: No pain, No stiffness  RESPIRATORY: No cough, wheezing, No hemoptysis, No shortness of breath  CARDIOVASCULAR: No chest pain, palpitations  GASTROINTESTINAL: No abdominal or epigastric pain. c/o  nausea, No vomiting; No diarrhea or constipation. [  ] BM  GENITOURINARY: No dysuria, No frequency, No urgency, No hematuria, or incontinence  NEUROLOGICAL: positive headaches, No dizziness, No numbness, No tingling, No tremors, No weakness  EXT: No Swelling, No Pain, No Edema  SKIN: upper chest/lower neck rash  MUSCULOSKELETAL: No joint pain or swelling; No muscle pain, No back pain, No extremity pain  PSYCHIATRIC: No depression, anxiety, mood swings or difficulty sleeping at night  PAIN SCALE: [  ] None  [x  ] Other- headache 8/10  ROS Unable to obtain due to - [  ] Dementia  [  ] Lethargy  [  ] Sedated [  ] non verbal  REST OF REVIEW Of SYSTEM - [  ] Normal     Vital Signs Last 24 Hrs  T(C): 36.8 (24 May 2018 07:44), Max: 38.2 (24 May 2018 00:14)  T(F): 98.3 (24 May 2018 07:44), Max: 100.8 (24 May 2018 00:14)  HR: 76 (24 May 2018 07:44) (74 - 96)  BP: 124/75 (24 May 2018 07:44) (98/60 - 140/80)  BP(mean): --  RR: 16 (24 May 2018 07:44) (15 - 16)  SpO2: 97% (24 May 2018 07:44) (96% - 99%)  Finger Stick      05-24 @ 07:01  -  05-24 @ 10:35  --------------------------------------------------------  IN: 380 mL / OUT: 200 mL / NET: 180 mL      PHYSICAL EXAM:  GENERAL:  [x  ] NAD , [ x] well appearing, [  ] Agitated, [  ] Drowsy,  [  ] Lethargy, [  ] confused   HEAD:  [x  ] Normal, [  ] Other  EYES:  [ x ] EOMI, [ x ] PERRLA, [x  ] conjunctiva and sclera clear normal, [  ] Other,  [  ] Pallor,[  ] Discharge  ENMT:  [x  ] Normal, [ x ] Moist mucous membranes, [ x ] Good dentition, [x  ] No Thrush  NECK:  [  x] Supple, [  ] No JVD, [ x ] Normal thyroid, [  ] Lymphadenopathy [  ] Other  CHEST/LUNG:  [x  ] Clear to auscultation bilaterally, [  ] Breath Sounds equal OR Decrease,  [x  ] No rales, [x  ] No rhonchi  [x  ]  No wheezing  HEART:  [ x ] Regular rate and rhythm, [  ] tachycardia, [  ] Bradycardia,  [  ] irregular  [ x ] No murmurs, No rubs, No gallops, [  ] PPM in place (Mfr:  )  ABDOMEN:  [x  ] Soft, [x  ] mild tenderness to deep palpation in LLQ, [ x ] Nondistended, [ x ] No mass, [ x ] Bowel sounds present, [  ] obese  NERVOUS SYSTEM:  [ x ] Alert & Oriented X3, [x ] Nonfocal  [  ] Confusion  [  ] Encephalopathic [  ] Sedated [  ] Unable to assess, [  ] Other-  EXTREMITIES: [x  ] 2+ Peripheral Pulses, No clubbing, No cyanosis,  [  ] edema B/L lower EXT. [  ] PVD stasis skin changes B/L Lower EXT  LYMPH: No lymphadenopathy noted  SKIN:  mild erythema in right chest/lower neck-improved    DIET: full liquid    LABS:                        14.2   7.93  )-----------( 241      ( 24 May 2018 01:50 )             41.4     24 May 2018 01:50    137    |  102    |  10     ----------------------------<  131    3.6     |  26     |  0.76     Ca    8.3        24 May 2018 01:50    TPro  7.6    /  Alb  3.5    /  TBili  0.3    /  DBili  x      /  AST  28     /  ALT  37     /  AlkPhos  82     24 May 2018 01:50    PT/INR - ( 24 May 2018 01:50 )   PT: 12.4 sec;   INR: 1.13 ratio         PTT - ( 24 May 2018 01:50 )  PTT:27.8 sec      Anemia Panel:      Thyroid Panel:        Lipase, Serum: 108 U/L (05-24-18 @ 01:50)        RADIOLOGY & ADDITIONAL TESTS: NONE      HEALTH ISSUES - PROBLEM Dx:  Need for prophylactic measure: Need for prophylactic measure  Hypercholesteremia: Hypercholesteremia  Essential hypertension: Essential hypertension  Coronary artery disease, angina presence unspecified, unspecified vessel or lesion type, unspecified whether native or transplanted heart: Coronary artery disease, angina presence unspecified, unspecified vessel or lesion type, unspecified whether native or transplanted heart  Rash: Rash  Prolonged QT interval: Prolonged QT interval  Acute nonintractable headache, unspecified headache type: Acute nonintractable headache, unspecified headache type  Diverticulitis: Diverticulitis  Sepsis, due to unspecified organism: Sepsis, due to unspecified organism      Consultant(s) Notes Reviewed:  [x  ] YES     Care Discussed with [X] Consultants  [ x ] Patient  [  ] Family  [  ]   [  ] Social Service  [ x ] RN, [  ] Physical Therapy  DVT PPX: [ x ] Lovenox, [  ] S C Heparin, [  ] Coumadin, [  ] Xarelto, [  ] Eliquis, [  ] Pradaxa, [  ] IV Heparin drip, [  ] SCD [  ] Contraindication 2 to GI Bleed,[  ] Ambulation  Advanced directive: [ x ] None, [  ] DNR/DNI Patient is a 63y old  Female who presents with a chief complaint of Diverticulitis (24 May 2018 02:32)      INTERVAL HPI: Patient seen and examined at bedside. Reports improvement in weakness after IVF. States she continues to feel  weak, nauseous. Erythematous rash around upper chest has improved. Admits to continued headache. Pt states Po Flagyl makes her very sick, had similar experience in past, this time it is worse, unable to eat anything. BP low this AM.    OVERNIGHT EVENTS:   NONE  Home Medications:  aspirin 81 mg oral tablet: 1 tab(s) orally once a day (08 Mar 2018 07:51)  lisinopril 20 mg oral tablet: 1 tab(s) orally once a day (08 Mar 2018 07:51)  Plavix 75 mg oral tablet: 1 tab(s) orally once a day (08 Mar 2018 07:51)  simvastatin 20 mg oral tablet: 1 tab(s) orally once a day (at bedtime) (08 Mar 2018 07:51)  Singulair 10 mg oral tablet: 1 tab(s) orally once a day (24 May 2018 02:52)  Toprol-XL 25 mg oral tablet, extended release: 1 tab(s) orally once a day (08 Mar 2018 07:51)  Zantac 150 oral tablet: 1 tab(s) orally 2 times a day (08 Mar 2018 07:51)  Zetia 10 mg oral tablet: 1 tab(s) orally once a day (08 Mar 2018 07:51)      MEDICATIONS  (STANDING):  aspirin enteric coated 81 milliGRAM(s) Oral daily  ciprofloxacin   IVPB 400 milliGRAM(s) IV Intermittent every 12 hours  clopidogrel Tablet 75 milliGRAM(s) Oral daily  enoxaparin Injectable 40 milliGRAM(s) SubCutaneous daily  famotidine    Tablet 20 milliGRAM(s) Oral daily  lisinopril 20 milliGRAM(s) Oral daily  metoprolol succinate ER 25 milliGRAM(s) Oral daily  metroNIDAZOLE  IVPB 500 milliGRAM(s) IV Intermittent every 8 hours  simvastatin 20 milliGRAM(s) Oral at bedtime    MEDICATIONS  (PRN):  acetaminophen   Tablet. 650 milliGRAM(s) Oral every 6 hours PRN Mild Pain (1 - 3)  diphenhydrAMINE   Capsule 25 milliGRAM(s) Oral every 4 hours PRN Rash and/or Itching  ondansetron   Disintegrating Tablet 4 milliGRAM(s) Oral every 8 hours PRN Nausea and/or Vomiting      Allergies    codeine (Vomiting)  penicillin (Rash)    Intolerances      REVIEW OF SYSTEMS: No appetite  CONSTITUTIONAL: No fever, No chills, No Body ache, No Weakness  EYES: No eye pain,  No visual disturbances, No discharge, NO Redness  ENMT:  No ear pain; No sinus or throat pain, No Congestion  NECK: No pain, No stiffness  RESPIRATORY: No cough, wheezing, No hemoptysis, No shortness of breath  CARDIOVASCULAR: No chest pain, palpitations  GASTROINTESTINAL: No abdominal or epigastric pain. c/o  nausea, No vomiting; No diarrhea or constipation. [  ] BM  GENITOURINARY: No dysuria, No frequency, No urgency, No hematuria, or incontinence  NEUROLOGICAL: positive headaches, No dizziness, No numbness, No tingling, No tremors, No weakness  EXT: No Swelling, No Pain, No Edema  SKIN: upper chest/lower neck rash  MUSCULOSKELETAL: No joint pain or swelling; No muscle pain, No back pain, No extremity pain  PSYCHIATRIC: No depression, anxiety, mood swings or difficulty sleeping at night  PAIN SCALE: [  ] None  [x  ] Other- headache 8/10  ROS Unable to obtain due to - [  ] Dementia  [  ] Lethargy  [  ] Sedated [  ] non verbal  REST OF REVIEW Of SYSTEM - [  ] Normal     Vital Signs Last 24 Hrs  T(C): 36.8 (24 May 2018 07:44), Max: 38.2 (24 May 2018 00:14)  T(F): 98.3 (24 May 2018 07:44), Max: 100.8 (24 May 2018 00:14)  HR: 76 (24 May 2018 07:44) (74 - 96)  BP: 124/75 (24 May 2018 07:44) (98/60 - 140/80)  BP(mean): --  RR: 16 (24 May 2018 07:44) (15 - 16)  SpO2: 97% (24 May 2018 07:44) (96% - 99%)  Finger Stick      05-24 @ 07:01  -  05-24 @ 10:35  --------------------------------------------------------  IN: 380 mL / OUT: 200 mL / NET: 180 mL      PHYSICAL EXAM:  GENERAL:  [x  ] NAD , [ x] well appearing, [  ] Agitated, [  ] Drowsy,  [  ] Lethargy, [  ] confused   HEAD:  [x  ] Normal, [  ] Other  EYES:  [ x ] EOMI, [ x ] PERRLA, [x  ] conjunctiva and sclera clear normal, [  ] Other,  [  ] Pallor,[  ] Discharge  ENMT:  [x  ] Normal, [ x ] Moist mucous membranes, [ x ] Good dentition, [x  ] No Thrush  NECK:  [  x] Supple, [  ] No JVD, [ x ] Normal thyroid, [  ] Lymphadenopathy [  ] Other  CHEST/LUNG:  [x  ] Clear to auscultation bilaterally, [  ] Breath Sounds equal OR Decrease,  [x  ] No rales, [x  ] No rhonchi  [x  ]  No wheezing  HEART:  [ x ] Regular rate and rhythm, [  ] tachycardia, [  ] Bradycardia,  [  ] irregular  [ x ] No murmurs, No rubs, No gallops, [  ] PPM in place (Mfr:  )  ABDOMEN:  [x  ] Soft, [x  ] mild tenderness to deep palpation in LLQ, [ x ] Nondistended, [ x ] No mass, [ x ] Bowel sounds present, [  ] obese  NERVOUS SYSTEM:  [ x ] Alert & Oriented X3, [x ] Nonfocal  [  ] Confusion  [  ] Encephalopathic [  ] Sedated [  ] Unable to assess, [  ] Other-  EXTREMITIES: [x  ] 2+ Peripheral Pulses, No clubbing, No cyanosis,  [  ] edema B/L lower EXT. [  ] PVD stasis skin changes B/L Lower EXT  LYMPH: No lymphadenopathy noted  SKIN:  mild erythema in right chest/lower neck-improved    DIET: full liquid    LABS:                        14.2   7.93  )-----------( 241      ( 24 May 2018 01:50 )             41.4     24 May 2018 01:50    137    |  102    |  10     ----------------------------<  131    3.6     |  26     |  0.76     Ca    8.3        24 May 2018 01:50    TPro  7.6    /  Alb  3.5    /  TBili  0.3    /  DBili  x      /  AST  28     /  ALT  37     /  AlkPhos  82     24 May 2018 01:50    PT/INR - ( 24 May 2018 01:50 )   PT: 12.4 sec;   INR: 1.13 ratio         PTT - ( 24 May 2018 01:50 )  PTT:27.8 sec      Anemia Panel:      Thyroid Panel:        Lipase, Serum: 108 U/L (05-24-18 @ 01:50)        RADIOLOGY & ADDITIONAL TESTS: NONE      HEALTH ISSUES - PROBLEM Dx:  Need for prophylactic measure: Need for prophylactic measure  Hypercholesteremia: Hypercholesteremia  Essential hypertension: Essential hypertension  Coronary artery disease, angina presence unspecified, unspecified vessel or lesion type, unspecified whether native or transplanted heart: Coronary artery disease, angina presence unspecified, unspecified vessel or lesion type, unspecified whether native or transplanted heart  Rash: Rash  Prolonged QT interval: Prolonged QT interval  Acute nonintractable headache, unspecified headache type: Acute nonintractable headache, unspecified headache type  Diverticulitis: Diverticulitis  Sepsis, due to unspecified organism: Sepsis, due to unspecified organism      Consultant(s) Notes Reviewed:  [x  ] YES     Care Discussed with [X] Consultants  [ x ] Patient  [  ] Family  [  ]   [  ] Social Service  [ x ] RN, [  ] Physical Therapy  DVT PPX: [ x ] Lovenox, [  ] S C Heparin, [  ] Coumadin, [  ] Xarelto, [  ] Eliquis, [  ] Pradaxa, [  ] IV Heparin drip, [  ] SCD [  ] Contraindication 2 to GI Bleed,[  ] Ambulation  Advanced directive: [ x ] None, [  ] DNR/DNI

## 2018-05-24 NOTE — ED PROVIDER NOTE - OBJECTIVE STATEMENT
62 yo female hx of diverticulitis, CAD c/o lower abdominal pain since this morning, was sent for outpatient CT a/p by her GI Dr. Fregoso that showed uncomplicated sigmoid diverticulitis.  Given PO cipro/flagyl, but patient unable to tolerate it and felt very nauseous.  Fever here 100.8.

## 2018-05-24 NOTE — H&P ADULT - PROBLEM SELECTOR PROBLEM 6
Coronary artery disease, angina presence unspecified, unspecified vessel or lesion type, unspecified whether native or transplanted heart Prolonged QT interval

## 2018-05-24 NOTE — PROGRESS NOTE ADULT - PROBLEM SELECTOR PLAN 5
Acute erythematous non papular rash of chest, possibly secondary to ?drug interaction. Patient has used same abx in the past.   -Erythema improved. Continue PO Benadryl q4hr PRN rash/itching Likely 2/2 to caffeine withdrawal  Neurologically intact.  -PRN Tylenol   -Motrin 400mg q 12 hrs PRN for pain.

## 2018-05-25 ENCOUNTER — TRANSCRIPTION ENCOUNTER (OUTPATIENT)
Age: 63
End: 2018-05-25

## 2018-05-25 VITALS
TEMPERATURE: 98 F | OXYGEN SATURATION: 96 % | HEART RATE: 78 BPM | RESPIRATION RATE: 16 BRPM | SYSTOLIC BLOOD PRESSURE: 128 MMHG | DIASTOLIC BLOOD PRESSURE: 83 MMHG

## 2018-05-25 DIAGNOSIS — E87.6 HYPOKALEMIA: ICD-10-CM

## 2018-05-25 LAB
ANION GAP SERPL CALC-SCNC: 8 MMOL/L — SIGNIFICANT CHANGE UP (ref 5–17)
BUN SERPL-MCNC: 5 MG/DL — LOW (ref 7–23)
CALCIUM SERPL-MCNC: 8.5 MG/DL — SIGNIFICANT CHANGE UP (ref 8.5–10.1)
CHLORIDE SERPL-SCNC: 108 MMOL/L — SIGNIFICANT CHANGE UP (ref 96–108)
CO2 SERPL-SCNC: 27 MMOL/L — SIGNIFICANT CHANGE UP (ref 22–31)
CREAT SERPL-MCNC: 0.66 MG/DL — SIGNIFICANT CHANGE UP (ref 0.5–1.3)
GLUCOSE SERPL-MCNC: 104 MG/DL — HIGH (ref 70–99)
HCT VFR BLD CALC: 40.2 % — SIGNIFICANT CHANGE UP (ref 34.5–45)
HGB BLD-MCNC: 13.8 G/DL — SIGNIFICANT CHANGE UP (ref 11.5–15.5)
MCHC RBC-ENTMCNC: 30 PG — SIGNIFICANT CHANGE UP (ref 27–34)
MCHC RBC-ENTMCNC: 34.3 GM/DL — SIGNIFICANT CHANGE UP (ref 32–36)
MCV RBC AUTO: 87.4 FL — SIGNIFICANT CHANGE UP (ref 80–100)
NRBC # BLD: 0 /100 WBCS — SIGNIFICANT CHANGE UP (ref 0–0)
PLATELET # BLD AUTO: 234 K/UL — SIGNIFICANT CHANGE UP (ref 150–400)
POTASSIUM SERPL-MCNC: 3.3 MMOL/L — LOW (ref 3.5–5.3)
POTASSIUM SERPL-SCNC: 3.3 MMOL/L — LOW (ref 3.5–5.3)
RBC # BLD: 4.6 M/UL — SIGNIFICANT CHANGE UP (ref 3.8–5.2)
RBC # FLD: 13.2 % — SIGNIFICANT CHANGE UP (ref 10.3–14.5)
SODIUM SERPL-SCNC: 143 MMOL/L — SIGNIFICANT CHANGE UP (ref 135–145)
WBC # BLD: 4.23 K/UL — SIGNIFICANT CHANGE UP (ref 3.8–10.5)
WBC # FLD AUTO: 4.23 K/UL — SIGNIFICANT CHANGE UP (ref 3.8–10.5)

## 2018-05-25 PROCEDURE — 36415 COLL VENOUS BLD VENIPUNCTURE: CPT

## 2018-05-25 PROCEDURE — 85027 COMPLETE CBC AUTOMATED: CPT

## 2018-05-25 PROCEDURE — 87040 BLOOD CULTURE FOR BACTERIA: CPT

## 2018-05-25 PROCEDURE — 80053 COMPREHEN METABOLIC PANEL: CPT

## 2018-05-25 PROCEDURE — 93005 ELECTROCARDIOGRAM TRACING: CPT

## 2018-05-25 PROCEDURE — 83690 ASSAY OF LIPASE: CPT

## 2018-05-25 PROCEDURE — 80048 BASIC METABOLIC PNL TOTAL CA: CPT

## 2018-05-25 PROCEDURE — 83605 ASSAY OF LACTIC ACID: CPT

## 2018-05-25 PROCEDURE — 99285 EMERGENCY DEPT VISIT HI MDM: CPT | Mod: 25

## 2018-05-25 PROCEDURE — 70450 CT HEAD/BRAIN W/O DYE: CPT | Mod: 26

## 2018-05-25 PROCEDURE — 70450 CT HEAD/BRAIN W/O DYE: CPT

## 2018-05-25 PROCEDURE — 85730 THROMBOPLASTIN TIME PARTIAL: CPT

## 2018-05-25 PROCEDURE — 85610 PROTHROMBIN TIME: CPT

## 2018-05-25 RX ORDER — MONTELUKAST 4 MG/1
10 TABLET, CHEWABLE ORAL DAILY
Qty: 0 | Refills: 0 | Status: DISCONTINUED | OUTPATIENT
Start: 2018-05-25 | End: 2018-05-25

## 2018-05-25 RX ORDER — TRAMADOL HYDROCHLORIDE 50 MG/1
25 TABLET ORAL ONCE
Qty: 0 | Refills: 0 | Status: DISCONTINUED | OUTPATIENT
Start: 2018-05-25 | End: 2018-05-25

## 2018-05-25 RX ORDER — LACTOBACILLUS ACIDOPHILUS 100MM CELL
1 CAPSULE ORAL
Qty: 0 | Refills: 0 | Status: DISCONTINUED | OUTPATIENT
Start: 2018-05-25 | End: 2018-05-25

## 2018-05-25 RX ORDER — LACTOBACILLUS ACIDOPHILUS 100MM CELL
1 CAPSULE ORAL
Qty: 28 | Refills: 0 | OUTPATIENT
Start: 2018-05-25 | End: 2018-06-07

## 2018-05-25 RX ORDER — CIPROFLOXACIN LACTATE 400MG/40ML
1 VIAL (ML) INTRAVENOUS
Qty: 4 | Refills: 0 | OUTPATIENT
Start: 2018-05-25 | End: 2018-05-26

## 2018-05-25 RX ORDER — POTASSIUM CHLORIDE 20 MEQ
40 PACKET (EA) ORAL ONCE
Qty: 0 | Refills: 0 | Status: COMPLETED | OUTPATIENT
Start: 2018-05-25 | End: 2018-05-25

## 2018-05-25 RX ADMIN — TRAMADOL HYDROCHLORIDE 25 MILLIGRAM(S): 50 TABLET ORAL at 09:01

## 2018-05-25 RX ADMIN — Medication 400 MILLIGRAM(S): at 05:41

## 2018-05-25 RX ADMIN — Medication 25 MILLIGRAM(S): at 05:40

## 2018-05-25 RX ADMIN — CLOPIDOGREL BISULFATE 75 MILLIGRAM(S): 75 TABLET, FILM COATED ORAL at 11:17

## 2018-05-25 RX ADMIN — FAMOTIDINE 20 MILLIGRAM(S): 10 INJECTION INTRAVENOUS at 11:17

## 2018-05-25 RX ADMIN — LISINOPRIL 20 MILLIGRAM(S): 2.5 TABLET ORAL at 05:40

## 2018-05-25 RX ADMIN — Medication 81 MILLIGRAM(S): at 11:17

## 2018-05-25 RX ADMIN — Medication 40 MILLIEQUIVALENT(S): at 11:17

## 2018-05-25 RX ADMIN — ERTAPENEM SODIUM 120 MILLIGRAM(S): 1 INJECTION, POWDER, LYOPHILIZED, FOR SOLUTION INTRAMUSCULAR; INTRAVENOUS at 05:40

## 2018-05-25 RX ADMIN — TRAMADOL HYDROCHLORIDE 25 MILLIGRAM(S): 50 TABLET ORAL at 09:30

## 2018-05-25 RX ADMIN — ENOXAPARIN SODIUM 40 MILLIGRAM(S): 100 INJECTION SUBCUTANEOUS at 11:17

## 2018-05-25 NOTE — PROGRESS NOTE ADULT - PROBLEM SELECTOR PLAN 8
Erythematous non papular rash of chest, possibly secondary to ?drug interaction. Patient has used same abx in the past. Improving from admission.   -Continue PO Benadryl q4hr PRN rash/itching  -IV abx switched to Invanz Erythematous non papular rash of chest, possibly secondary to ?drug interaction. Patient has used same abx in the past. Greatly improved since admission. No longer visible.    -Continue PO Benadryl q4hr PRN rash/itching  -IV abx switched to Invanz Lovenox 40 SC daily for DVT ppx    IMPROVE VTE Individual Risk Assessment          RISK                                                          Points  [  ] Previous VTE                                                3  [  ] Thrombophilia                                             2  [  ] Lower limb paralysis                                   2        (unable to hold up >15 seconds)    [  ] Current Cancer                                             2         (within 6 months)  [  ] Immobilization > 24 hrs                              1  [  ] ICU/CCU stay > 24 hours                             1  [ x ] Age > 60                                                         1    IMPROVE VTE Score: 1  -D/C planning. Patient tolerating low fiber diet. Ambulating well. Feels better.

## 2018-05-25 NOTE — PROGRESS NOTE ADULT - ATTENDING COMMENTS
Pt seen, examined, case & care plan d/w pt, residents, ID DR Davenport & GI DR Dumas at detail.  D/C Home,   Total D/C care time is 45 minutes.
Pt seen, examined, case & care plan d/w pt, residents, & GI Dr Dumas at detail.  ID eval for PO Abx as per GI, since pt is NOT able to tolerate PO Cipro & Flagyl.

## 2018-05-25 NOTE — PROGRESS NOTE ADULT - PROBLEM SELECTOR PLAN 6
Likely 2/2 to ?caffeine withdrawal ?allergies Neurologically intact.  -STAT Ultram ordered  -Restart home medication Singulair  -CT head ordered Likely 2/2 to ?caffeine withdrawal ?allergies Neurologically intact.  -STAT Ultram ordered  -Restart home medication Singulair  -CT head ordered- No acute changed, Out pt follow with Dr Edwards

## 2018-05-25 NOTE — CONSULT NOTE ADULT - SUBJECTIVE AND OBJECTIVE BOX
Chief Complaint:  Patient is a 63y old  Female who presents with a chief complaint of Diverticulitis and nausea, unable to tolerate oral antibiotics (24 May 2018 02:32)      HPI:  Patient is a 63 year old Female with PMHx of Hx NSTEMI IN , CAD s/p 1 stent in LAD  and 1 stent in Ramus , HTN, HLD, cervical cancer, hx of episode of Diverticulitis in 2018, presents to the Ed due to lower abdominal pain/pressure rated 5/10 nonradiating, weakness, nausea since this morning. She recently saw her GI Dr. Fregoso and was sent for Ct abdomen pelvis which showed uncomplicated sigmoid diverticulitis. She was given PO Cipro/Flagyl but could not tolerate the medications, they made her very nauseas and weak. She reports she has had 1 bout of diverticulitis in the past, occurred 3 months ago in 2018. She denies recent travel or sick contacts. She also reports headache moderate-severe in intensity, reports she had the same headache the last time she had diverticulitis. She denies dizziness, blurred vision, slurred speech, chest pain, palpitations, SOB, vomiting, diarrhea.     In the Ed, patient had temp 100.8, hr 96, /81, RR 16, Spo2 96% Ra. CBC, PT/INR, Lactate normal, Lipase and CMP within normal limits. CT abd/pelvic with oral contrast showed mild sigmoid diverticulitis, improved from prior. EKG showed sinus rhythm, left anterior fascicular block, prolonged , QTc 496, VR 91. Patient received 1 dose IV Flagyl, 1 L NS, Tylenol, Zofran. Blood cultures x 2 drawn. Patient was receiving dose of IV Cipro, ran over one hour, when there was 10 minutes remaining patient developed red rash on her chest, non-itchy, nonpainful. Patient denies shortness of breath. The Cipro was paused for the last ten minutes and not given the remainder. GI now clled for evaluation of condition. (24 May 2018 02:32)      Allergies:  codeine (Vomiting)  penicillin (Rash)      Medications:  acetaminophen   Tablet. 650 milliGRAM(s) Oral every 6 hours PRN  aspirin enteric coated 81 milliGRAM(s) Oral daily  ciprofloxacin   IVPB 400 milliGRAM(s) IV Intermittent every 12 hours  clopidogrel Tablet 75 milliGRAM(s) Oral daily  diphenhydrAMINE   Capsule 25 milliGRAM(s) Oral every 4 hours PRN  enoxaparin Injectable 40 milliGRAM(s) SubCutaneous daily  famotidine    Tablet 20 milliGRAM(s) Oral daily  ibuprofen  Tablet 400 milliGRAM(s) Oral every 6 hours PRN  lisinopril 20 milliGRAM(s) Oral daily  metoprolol succinate ER 25 milliGRAM(s) Oral daily  metroNIDAZOLE  IVPB 500 milliGRAM(s) IV Intermittent every 8 hours  ondansetron   Disintegrating Tablet 4 milliGRAM(s) Oral every 8 hours PRN  simvastatin 20 milliGRAM(s) Oral at bedtime  sodium chloride 0.9%. 1000 milliLiter(s) IV Continuous <Continuous>      PMHX/PSHX:  Hypertension  Hypercholesteremia  S/P coronary artery stent placement  CAD (coronary artery disease)  Cervical cancer  S/P bunionectomy  Status post   S/P bunionectomy  Cervical cancer  S/P angioplasty with stent  Status post bunionectomy      Family history:  Family history of prostate cancer in father (Father)  Family history of heart disease (Father)      Social History:     ROS:     General:  No wt loss, fevers, chills, night sweats, fatigue,   Eyes:  Good vision, no reported pain  ENT:  No sore throat, pain, runny nose, dysphagia  CV:  No pain, palpitations, hypo/hypertension  Resp:  No dyspnea, cough, tachypnea, wheezing  GI:  +LLQ pain, + nausea, + vomiting, No diarrhea, No constipation, No weight loss, No fever, No pruritis, No rectal bleeding, No tarry stools, No dysphagia,  :  No pain, bleeding, incontinence, nocturia  Muscle:  No pain, weakness  Neuro:  No weakness, tingling, memory problems        PHYSICAL EXAM:   Vital Signs:  Vital Signs Last 24 Hrs  T(C): 36.8 (24 May 2018 07:44), Max: 38.2 (24 May 2018 00:14)  T(F): 98.3 (24 May 2018 07:44), Max: 100.8 (24 May 2018 00:14)  HR: 76 (24 May 2018 07:44) (74 - 96)  BP: 124/75 (24 May 2018 07:44) (98/60 - 140/80)  BP(mean): --  RR: 16 (24 May 2018 07:44) (15 - 16)  SpO2: 97% (24 May 2018 07:44) (96% - 99%)  Daily     Daily     GENERAL:  Appears stated age, well-groomed, well-nourished, no distress  HEENT:  NC/AT,  conjunctivae clear and pink, no thyromegaly, nodules, adenopathy, no JVD, sclera -anicteric  NECK: Supple, No JVD  CHEST:  Full & symmetric excursion, no increased effort, breath sounds clear  HEART:  Regular rhythm, S1, S2, no murmur/rub/S3/S4, no abdominal bruit, no edema  ABDOMEN:  Soft, Mild LLQ tenderness, non-distended, normoactive bowel sounds,  no masses ,no hepato-splenomegaly, no signs of chronic liver disease  EXTEREMITIES:  no cyanosis,clubbing or edema  NEURO:  Alert, oriented, no asterixis, no tremor, no encephalopathy    LABS:                        14.2   7.93  )-----------( 241      ( 24 May 2018 01:50 )             41.4     24    137  |  102  |  10  ----------------------------<  131<H>  3.6   |  26  |  0.76    Ca    8.3<L>      24 May 2018 01:50    TPro  7.6  /  Alb  3.5  /  TBili  0.3  /  DBili  x   /  AST  28  /  ALT  37  /  AlkPhos  82  0524    LIVER FUNCTIONS - ( 24 May 2018 01:50 )  Alb: 3.5 g/dL / Pro: 7.6 g/dL / ALK PHOS: 82 U/L / ALT: 37 U/L / AST: 28 U/L / GGT: x           PT/INR - ( 24 May 2018 01:50 )   PT: 12.4 sec;   INR: 1.13 ratio         PTT - ( 24 May 2018 01:50 )  PTT:27.8 sec    Amylase Serum--      Lipase [id number]       Ammonia--      Imaging:
[facility], Division of Infectious Diseases  MILLI Rowley A. Lee    LUMA LANIER  63y, Female  [id number]    HPI--  63F now with 2nd-3rd episode of diverticulitis since Feb. Patient had similar low abdominal pain compared to "when I was healing from diverticulitis the first time" asnd saw Dr. Dumas. Patient had Ct revealed changes consistent with diverticulitis. Patient developed nausea, vomiting and weakness from Flagyl, which was an issue when she took the medication previoulsy, and was referred to the ER. ER admitted patient. Patient now feeling back to normal.     PMH/PSH--  Hypertension  Hypercholesteremia  S/P coronary artery stent placement  CAD (coronary artery disease)  Cervical cancer  S/P bunionectomy  Status post   S/P bunionectomy  Cervical cancer  S/P angioplasty with stent  Status post bunionectomy      Allergies-- Penicillin many years ago -> hives.      Medications--  Antibiotics: ertapenem  IVPB 1000 milliGRAM(s) IV Intermittent every 24 hours    Immunologic:   Other: acetaminophen   Tablet. PRN  aspirin enteric coated  clopidogrel Tablet  diphenhydrAMINE   Capsule PRN  enoxaparin Injectable  famotidine    Tablet  ibuprofen  Tablet PRN  lisinopril  metoprolol succinate ER  montelukast  ondansetron   Disintegrating Tablet PRN  potassium chloride    Tablet ER  simvastatin      Social History--  EtOH: on weekends. No recent EtOH use (interaction w Flagyl). No EtOh in meds etc.  Tobacco: denies   Drug Use: denies     Family/Marital History--  Family history of prostate cancer in father (Father)  Family history of heart disease (Father)    Remainder not relevant to clinical concern.    Travel/Environmental/Occupational History:  [employer] businsess/management with medical records mergers    Review of Systems:  A >=10-point review of systems was obtained.   Review of systems otherwise negative except as previously noted.    Physical Exam--  Vital Signs: T(F): 98.5 (18 @ 07:35), Max: 99.1 (18 @ 20:46)  HR: 78 (18 @ 07:35)  BP: 128/83 (18 @ 07:35)  RR: 16 (18 @ 07:35)  SpO2: 96% (18 @ 07:35)  Wt(kg): --  General: Nontoxic-appearing Female in no acute distress.  HEENT: AT/NC. Anicteric. Conjunctiva pink and moist. Oropharynx clear.   Neck: Not rigid. No sense of mass.  Nodes: None palpable.  Lungs: Clear bilaterally without rales, wheezing or rhonchi  Heart: Regular rate and rhythm. No Murmur. No rub. No gallop. No palpable thrill.  Abdomen: Bowel sounds present and normoactive. Soft. Nondistended. Nontender. No sense of mass. No organomegaly.  Back: No spinal tenderness. No costovertebral angle tenderness.   Extremities: No cyanosis or clubbing. No edema.   Skin: Warm. Dry. Good turgor. No rash. No vasculitic stigmata.  Psychiatric: Appropriate affect and mood for situation.         Laboratory & Imaging Data--  CBC                        13.8   4.23  )-----------( 234      ( 25 May 2018 08:31 )             40.2       Chemistries      143  |  108  |  5<L>  ----------------------------<  104<H>  3.3<L>   |  27  |  0.66    Ca    8.5      25 May 2018 08:31    TPro  7.6  /  Alb  3.5  /  TBili  0.3  /  DBili  x   /  AST  28  /  ALT  37  /  AlkPhos  82        Culture Data    Culture - Blood (collected 24 May 2018 08:51)  Source: .Blood Blood  Preliminary Report (25 May 2018 09:01):    No growth to date.    Culture - Blood (collected 24 May 2018 08:51)  Source: .Blood Blood  Preliminary Report (25 May 2018 09:01):    No growth to date.      < from: CT Abdomen and Pelvis w/ Oral Cont (18 @ 15:34) >  EXAM:  CT ABDOMEN AND PELVIS OC    PROCEDURE DATE:  2018    INTERPRETATION:  CLINICAL INFORMATION: Left lower quadrant tenderness.   Diverticulitis.    COMPARISON: 2018    PROCEDURE:   CT of the Abdomen and Pelvis was performed without intravenous contrast.   Intravenous contrast: None.  Oral contrast: positive contrast was administered.  Sagittal and coronal reformats were performed.    FINDINGS:    LOWER CHEST: Within normal limits.    LIVER: Tiny low-attenuationleft hepatic lesion too small to characterize  BILE DUCTS: Normal caliber.  GALLBLADDER: Within normal limits.  SPLEEN: Within normal limits.  PANCREAS: Within normal limits.  ADRENALS: Within normal limits.  KIDNEYS/URETERS: Within normal limits.    BLADDER: Within normal limits.  REPRODUCTIVE ORGANS: Uterus normal. 6.4 cm right ovarian cystic lesion   unchanged. 4.3 cm left ovarian cystic lesion unchanged. Small right   hyperdense Bartholin's duct cyst.    BOWEL: Mild sigmoid diverticulitis, improved. No abscess. No bowel   obstruction. Appendix normal.  PERITONEUM: No ascites.  VESSELS:  Within normal limits.  RETROPERITONEUM: No lymphadenopathy.    ABDOMINAL WALL: Within normal limits.  BONES: Within normal limits.    IMPRESSION: Mild sigmoid diverticulitis, improved. Unchanged cystic   ovarian lesions.    LORENA EVERETT M.D., ATTENDING RADIOLOGIST  This document has been electronically signed. May 23 2018  4:07PM     < end of copied text >

## 2018-05-25 NOTE — CONSULT NOTE ADULT - ASSESSMENT
Adverse reaction to Flagyl.  Penicillin allergy.  No concern of uncontrolled infection on exam.  Limited options for antibiotic coverage.  Patient's abdominal exam normal, improved much more rapidly than I would expect from a day or so of antibiotics.    Suggestions--  PO Clinda 300mg PO Q8H, duration per GI, as a substitute for Flagyl reasonable.  AE addressed  Continue priobiotic  OPD allergy evaluation re: PCN allergy    Discussed with Dr. Mcnally.    Please recall PRN.    Antoine Davenport MD  [phone]
Diverticulitis    1 Pt  appears to be intolerant to Flagyl PO  2 Agree with IV abx and IVF at this time  3 Clear liquids to be advanced to full liquids and then to low residue diet as tolerated  4 Consider ID consult for ABX recommendations (? Cipro and Alinia as outpatient?)  5 Discussed patient condition with Dr. Mcnally(PMD)  6 Spent 55-65 minutes on patient care  7 Follow up as outpatient  8 Thank you for allowing me to take part in Mrs. Gillis's care

## 2018-05-25 NOTE — DISCHARGE NOTE ADULT - SECONDARY DIAGNOSIS.
Essential hypertension Hypercholesteremia CAD (coronary artery disease) Acute nonintractable headache, unspecified headache type

## 2018-05-25 NOTE — DISCHARGE NOTE ADULT - PROVIDER TOKENS
TOKEN:'[id number]:'[id number]' TOKEN:'[id number]:'[id number]:'[id number]',TOKEN:'[id number]'

## 2018-05-25 NOTE — PROGRESS NOTE ADULT - PROBLEM SELECTOR PLAN 3
CAD s/p 2 stents  Continue ASA and Plavix, BB, Statin CAD s/p 2 stents  Continue ASA and Plavix, BB, Statin  Follow with DR Martinez as out pt

## 2018-05-25 NOTE — DISCHARGE NOTE ADULT - MEDICATION SUMMARY - MEDICATIONS TO STOP TAKING
I will STOP taking the medications listed below when I get home from the hospital:    Flagyl 500 mg oral tablet  -- 1 tab(s) by mouth 2 times a day x 10 days  -- Do not drink alcoholic beverages when taking this medication.  Finish all this medication unless otherwise directed by prescriber.  May discolor urine or feces.

## 2018-05-25 NOTE — DISCHARGE NOTE ADULT - CARE PROVIDER_API CALL
Natanael Dumas (DO), Gastroenterology  [address] [place] [address]  Phone: [phone]  Fax: [phone]    Misty Rodgers (DO), Family Medicine  [address] [place] [address]  Phone: [phone]  Fax: [phone] Natanael Dumas (DO), Gastroenterology  [address] [place] [address]  Phone: [phone]  Fax: [phone]    Misty Rodgers (), Family Medicine  [address] [place] [address]  [address]  [place] [address]  Phone: [phone]  Fax: [phone]    Antoine Davenport (MD), Infectious Disease; Internal Medicine  [address] [facility]  [address]  [place] [address]  Phone: [phone]  Fax: [phone]    Abelino Edwards (MB), Neurology  [address] [place] [address]  Phone: [phone]  Fax: [phone] Natanael Dumas (DO), Gastroenterology  [address] [place] [address]  Phone: [phone]  Fax: [phone]    Misty Rodgers (), Family Medicine  [address] [place] [address]  [address]  [place] [address]  Phone: [phone]  Fax: [phone]    Antoine Davenport (MD), Infectious Disease; Internal Medicine  [address] [facility]  [address]  [place] [address]  Phone: [phone]  Fax: [phone]    Abelino Edwards (MB), Neurology  [address] [place] [address]  Phone: [phone]  Fax: [phone]

## 2018-05-25 NOTE — DISCHARGE NOTE ADULT - ADDITIONAL INSTRUCTIONS
Follow up with Dr. Dumas, your gastroenterologist, within the week.   Follow up with your primary care doctor, Dr. Rodgers within the week.  Follow up with neurologist, Dr. Edwards for outpatient work up of your headache. Follow up with Dr. Dumas, your gastroenterologist, within the week.  If needed you can follow up with ID Dr curtis.  Follow up with your primary care doctor, Dr. Rodgers within the week.  Follow up with neurologist, Dr. Edwards for outpatient work up of your headache.

## 2018-05-25 NOTE — PROGRESS NOTE ADULT - PROBLEM SELECTOR PLAN 9
Lovenox 40 SC daily for DVT ppx    IMPROVE VTE Individual Risk Assessment          RISK                                                          Points  [  ] Previous VTE                                                3  [  ] Thrombophilia                                             2  [  ] Lower limb paralysis                                   2        (unable to hold up >15 seconds)    [  ] Current Cancer                                             2         (within 6 months)  [  ] Immobilization > 24 hrs                              1  [  ] ICU/CCU stay > 24 hours                             1  [ x ] Age > 60                                                         1    IMPROVE VTE Score: 1  -D/C planning Lovenox 40 SC daily for DVT ppx    IMPROVE VTE Individual Risk Assessment          RISK                                                          Points  [  ] Previous VTE                                                3  [  ] Thrombophilia                                             2  [  ] Lower limb paralysis                                   2        (unable to hold up >15 seconds)    [  ] Current Cancer                                             2         (within 6 months)  [  ] Immobilization > 24 hrs                              1  [  ] ICU/CCU stay > 24 hours                             1  [ x ] Age > 60                                                         1    IMPROVE VTE Score: 1  -D/C planning. Patient tolerating low fiber diet. Ambulating well. Feels better.

## 2018-05-25 NOTE — DISCHARGE NOTE ADULT - PATIENT PORTAL LINK FT
You can access the [facility] Patient Portal, offered by [facility], by registering with the following website: [email or web address]

## 2018-05-25 NOTE — PROGRESS NOTE ADULT - PROBLEM SELECTOR PLAN 1
Uncomplicated diverticulitis, -Intolerance to Cipro/Flagyl, continue IV Invanz, per Dr. Hyatt ID D/W  -Zofran PRN for nausea  -Follow up blood cultures x2, no growth to date  -Dr. Fregoso, GI, consulted   -Diet advanced to low fiber  -d/c IVF, patient tolerating diet Uncomplicated diverticulitis, -Intolerance to Cipro/Flagyl, continue IV Invanz, per Dr. Hyatt ID D/W, Dr Davenport follow up done today, recommends PO Clindamycin 300 mg q 8 hrs & PO cipro 2x day PO   Case D/W DR Dumas-GI , Rx total 14 days, including hospital Rx  -Zofran PRN for nausea  -Follow up blood cultures x2, no growth to date  -Dr. Fregoso, GI, D/W   -Diet advanced to low fiber  -d/c IVF, patient tolerating diet

## 2018-05-25 NOTE — DISCHARGE NOTE ADULT - HOSPITAL COURSE
63F with PMHx of Hx NSTEMI IN 2012, CAD s/p 1 stent in LAD 2007 and 1 stent in Ramus 2012, HTN, HLD, cervical cancer, hx of episode of Diverticulitis in 02/2018, presents to the Ed due to lower abdominal pain/pressure rated 5/10 non-radiating, weakness, nausea since this morning. She recently saw her GI Dr. Dumas, and was sent for Ct abdomen pelvis which showed uncomplicated sigmoid diverticulitis. She was given PO Cipro/Flagyl but could not tolerate the medications, they made her very nauseas and weak. She reports she has had 1 bout of diverticulitis in the past, occurred 3 months ago in 02/2018. CT abd/pelvic with oral contrast showed mild sigmoid diverticulitis, improved from prior. 63F with PMHx of Hx NSTEMI IN 2012, CAD s/p 1 stent in LAD 2007 and 1 stent in Ramus 2012, HTN, HLD, cervical cancer, hx of episode of Diverticulitis in 02/2018, presents to the Ed due to lower abdominal pain/pressure rated 5/10 non-radiating, weakness, nausea since this morning. She recently saw her GI Dr. Dumas, and was sent for Ct abdomen pelvis which showed uncomplicated sigmoid diverticulitis. She was given PO Cipro/Flagyl but could not tolerate the medications, they made her very nauseas and weak. She reports she has had 1 bout of diverticulitis in the past, occurred 3 months ago in 02/2018. CT abd/pelvic with oral contrast showed mild sigmoid diverticulitis, improved from prior. Pt is NOT able to tolerate PO antibiotics & unable to take po food &  fluids. Patient started on IV Flagyl and IV Cipro, patient noted to have erythema around her neck and upper chest. ID, Dr. Hyatt, was consulted and started patient on IV Invanz. Dr. Dumas, GI, evaluated patient and started on liquid diet and advanced as tolerated. Patient complained of headache throughout hospital course. CT head:     . Ultram relieved headache. Blood cultures x2 showed no growth to date. On day of discharge, patient tolerated PO diet and was ambulating without difficulty. IV Invanz transitioned to oral Clindamycin and oral cipro. 63F with PMHx of Hx NSTEMI IN 2012, CAD s/p 1 stent in LAD 2007 and 1 stent in Ramus 2012, HTN, HLD, cervical cancer, hx of episode of Diverticulitis in 02/2018, presents to the Ed due to lower abdominal pain/pressure rated 5/10 non-radiating, weakness, nausea since this morning. She recently saw her GI Dr. Dumas, and was sent for Ct abdomen pelvis which showed uncomplicated sigmoid diverticulitis. She was given PO Cipro/Flagyl but could not tolerate the medications, they made her very nauseas and weak. She reports she has had 1 bout of diverticulitis in the past, occurred 3 months ago in 02/2018. CT abd/pelvic with oral contrast showed mild sigmoid diverticulitis, improved from prior. Pt is NOT able to tolerate PO antibiotics & unable to take po food &  fluids. Patient started on IV Flagyl and IV Cipro, patient noted to have erythema around her neck and upper chest. ID, Dr. Hyatt, was consulted and started patient on IV Invanz. Dr. Dumas, GI, evaluated patient and started on liquid diet and advanced as tolerated. Patient complained of headache throughout hospital course. CT head negative for acute pathology. Ultram relieved headache. Blood cultures x2 showed no growth to date. On day of discharge, patient tolerated PO diet and was ambulating without difficulty. IV Invanz transitioned to oral Clindamycin and oral cipro. Discussed with patient to follow up with ID, Dr. Davenport, concerning abx use. Follow up with neurologist, Dr. Edwards for outpatient work up on headache. 63F with PMHx of Hx NSTEMI IN 2012, CAD s/p 1 stent in LAD 2007 and 1 stent in Ramus 2012, HTN, HLD, cervical cancer, hx of episode of Diverticulitis in 02/2018, presents to the Ed due to lower abdominal pain/pressure rated 5/10 non-radiating, weakness, nausea since this morning. She recently saw her GI Dr. Dumas, and was sent for Ct abdomen pelvis which showed uncomplicated sigmoid diverticulitis. She was given PO Cipro/Flagyl but could not tolerate the medications, they made her very nauseas and weak. She reports she has had 1 bout of diverticulitis in the past, occurred 3 months ago in 02/2018. CT abd/pelvic with oral contrast showed mild sigmoid diverticulitis, improved from prior. Pt is NOT able to tolerate PO antibiotics specially Po Flagyl & unable to take po food &  fluids. Patient started on IV Flagyl and IV Cipro, patient noted to have erythema around her neck and upper chest. Pt was hypotensive, started on IV fluids, pt still was feeling Nausea on IV Abx & unable to take Po food, Dr. Dumas, GI, evaluated patient D/W GI about change of Abx, pt has PCN All, GI recommended ID consult ID, Dr. Hyatt, was consulted and started patient on IV Invanz. Pt  on liquid diet and advanced as tolerated. Patient complained of headache throughout hospital course. CT head negative for acute pathology. Ultram  & Motrin relieved headache. Blood cultures x2 showed no growth to date. On day of discharge, patient tolerated PO diet and was ambulating without difficulty. IV Invanz transitioned to oral Clindamycin and oral Cipro  as per Dr Davenport, 2 weeks total Abx as per GI,as pt has 2 nd episode, Discussed with patient to follow up with ID, Dr. Davenport, concerning abx use, Follow up with neurologist, Dr. Edwards for outpatient work up on headache. Pt stable for D/C .

## 2018-05-25 NOTE — PROGRESS NOTE ADULT - ASSESSMENT
Patient is a 63 year old Female with PMHx of Hx NSTEMI IN 2012, CAD s/p 1 stent in LAD 2007 and 1 stent in Ramus 2012, HTN, HLD, cervical cancer, hx of episode of Diverticulitis in 02/2018, presents to the Ed due to lower abdominal pain/pressure rated 5/10 nonradiating, weakness, nausea since this morning. She recently saw her GI Dr. Fregoso and was sent for Ct abdomen pelvis which showed uncomplicated sigmoid diverticulitis ,pt is admitted for  acute sigmoid diverticulitis with poor Po intake & inability to tolerate oral Antibiotics & Diet, acute headache. Patient is a 63 year old Female with PMHx of Hx NSTEMI IN 2012, CAD s/p 1 stent in LAD 2007 and 1 stent in Ramus 2012, HTN, HLD, cervical cancer, hx of episode of Diverticulitis in 02/2018, presents to the Ed due to lower abdominal pain/pressure rated 5/10 nonradiating, weakness, nausea since this morning. She recently saw her GI Dr. Fregoso and was sent for Ct abdomen pelvis which showed uncomplicated sigmoid diverticulitis ,pt is admitted for  acute sigmoid diverticulitis with poor Po intake & inability to tolerate oral Antibiotics & Diet, acute headache resolved.

## 2018-05-25 NOTE — DISCHARGE NOTE ADULT - MEDICATION SUMMARY - MEDICATIONS TO TAKE
I will START or STAY ON the medications listed below when I get home from the hospital:    aspirin 81 mg oral tablet  -- 1 tab(s) by mouth once a day  -- Indication: For CAD (coronary artery disease)    lisinopril 20 mg oral tablet  -- 1 tab(s) by mouth once a day  -- Indication: For Essential hypertension    Zetia 10 mg oral tablet  -- 1 tab(s) by mouth once a day  -- Indication: For Hypercholesteremia    simvastatin 20 mg oral tablet  -- 1 tab(s) by mouth once a day (at bedtime)  -- Indication: For Hypercholesteremia    Plavix 75 mg oral tablet  -- 1 tab(s) by mouth once a day  -- Indication: For CAD (coronary artery disease)    Toprol-XL 25 mg oral tablet, extended release  -- 1 tab(s) by mouth once a day  -- Indication: For Essential hypertension    Zantac 150 oral tablet  -- 1 tab(s) by mouth 2 times a day  -- Indication: For GERD    Singulair 10 mg oral tablet  -- 1 tab(s) by mouth once a day  -- Indication: For Allergies    clindamycin 300 mg oral capsule  -- 1 cap(s) by mouth 3 times a day   -- Finish all this medication unless otherwise directed by prescriber.  Medication should be taken with plenty of water.    -- Indication: For Diverticulitis    Bacid (LAC) oral tablet  -- 1 tab(s) by mouth 2 times a day   -- Indication: For Diverticulitis    ciprofloxacin 500 mg oral tablet  -- 1 tab(s) by mouth 2 times a day   -- Avoid prolonged or excessive exposure to direct and/or artificial sunlight while taking this medication.  Check with your doctor before becoming pregnant.  Do not take dairy products, antacids, or iron preparations within one hour of this medication.  Finish all this medication unless otherwise directed by prescriber.  Medication should be taken with plenty of water.    -- Indication: For Diverticulitis I will START or STAY ON the medications listed below when I get home from the hospital:    aspirin 81 mg oral tablet  -- 1 tab(s) by mouth once a day  -- Indication: For CAD (coronary artery disease)    lisinopril 20 mg oral tablet  -- 1 tab(s) by mouth once a day  -- Indication: For Essential hypertension    Zetia 10 mg oral tablet  -- 1 tab(s) by mouth once a day  -- Indication: For Hypercholesteremia    simvastatin 20 mg oral tablet  -- 1 tab(s) by mouth once a day (at bedtime)  -- Indication: For Hypercholesteremia    Plavix 75 mg oral tablet  -- 1 tab(s) by mouth once a day  -- Indication: For CAD (coronary artery disease)    Toprol-XL 25 mg oral tablet, extended release  -- 1 tab(s) by mouth once a day  -- Indication: For Essential hypertension    Zantac 150 oral tablet  -- 1 tab(s) by mouth 2 times a day  -- Indication: For GERD    Singulair 10 mg oral tablet  -- 1 tab(s) by mouth once a day  -- Indication: For Allergies    clindamycin 300 mg oral capsule  -- 1 cap(s) by mouth 3 times a day   -- Finish all this medication unless otherwise directed by prescriber.  Medication should be taken with plenty of water.    -- Indication: For Diverticulitis    Bacid (LAC) oral tablet  -- 1 tab(s) by mouth 2 times a day   -- Indication: For Probiotics    ciprofloxacin 500 mg oral tablet  -- 1 tab(s) by mouth 2 times a day   -- Avoid prolonged or excessive exposure to direct and/or artificial sunlight while taking this medication.  Check with your doctor before becoming pregnant.  Do not take dairy products, antacids, or iron preparations within one hour of this medication.  Finish all this medication unless otherwise directed by prescriber.  Medication should be taken with plenty of water.    -- Indication: For Diverticulitis

## 2018-05-25 NOTE — DISCHARGE NOTE ADULT - PLAN OF CARE
resolution of symptoms Your CT scan showed you had diverticulitis. Continue with the medications as prescribed (available at your pharmacy). Follow up with Dr. Dumas, your gastroenterologist, within the week. continue home medication Toprol XL, Lisinopril continue home medication Zetia and Simvastatin continue baby aspirin and Plavix Your CT scan showed you had diverticulitis. START CLINDAMYCIN 300MG THREE TIMES A DAY for 12 DAYS (available at your pharmacy). Stop taking Flagyl. Continue CIPROFLOXACIN (prescribed outpatient), an additional 2 days are available at your pharmacy, for a total of 12 DAYS. You were prescribed a probiotic to take with the antibiotics. Follow up with Dr. Dumas, your gastroenterologist, within the week. Follow up with your primary care doctor, Dr. Rodgers within the week. If you have problems with the antibiotics affect on you, contact infectious disease Dr. Davenport (number listed below). You received a cat scan of your head during your hospitalization which was negative for acute processes. Follow up with neurologist, Dr. Edwards (number below), for outpatient work up of your headache. Your CT scan showed you had diverticulitis.   START CLINDAMYCIN 300MG THREE TIMES A DAY for 12 DAYS (available at your pharmacy).  - STOP  taking Flagyl.   -Continue CIPROFLOXACIN 500 mg 2x day x 12 days total (prescribed outpatient), an additional 2 days are available at your pharmacy, for a total of 12 DAYS.   You were prescribed a probiotic to take with the antibiotics x 4 weeks. Follow up with Dr. Dumas, your gastroenterologist, within the week. Follow up with your primary care doctor, Dr. Rodgers within the week. If you have problems with the antibiotics affect on you, contact infectious disease Dr. Davenport (number listed below). You received a cat scan of your head during your hospitalization which was negative for acute processes. Follow up with neurologist, Dr. Edwards (number below), for outpatient work up of your headache.  Take Motrin 400 mg 3x day as needed with food for Headache.

## 2018-05-25 NOTE — DISCHARGE NOTE ADULT - CARE PROVIDERS DIRECT ADDRESSES
,DirectAddress_Unknown,[email or web address] ,DirectAddress_Unknown,[email or web address],DirectAddress_Unknown,DirectAddress_Unknown

## 2018-05-25 NOTE — PROGRESS NOTE ADULT - PROBLEM SELECTOR PLAN 7
As seen on EKG  Careful administration of zofran/reglan  Repeat EKG tomorrow As seen on EKG  Careful administration of zofran/reglan As seen on EKG, Follow with Dr Martinez as out pt  Careful administration of zofran/reglan

## 2018-05-25 NOTE — DISCHARGE NOTE ADULT - CARE PLAN
Principal Discharge DX:	Diverticulitis  Goal:	resolution of symptoms  Assessment and plan of treatment:	Your CT scan showed you had diverticulitis. Continue with the medications as prescribed (available at your pharmacy). Follow up with Dr. Dumas, your gastroenterologist, within the week.  Secondary Diagnosis:	Essential hypertension  Assessment and plan of treatment:	continue home medication Toprol XL, Lisinopril  Secondary Diagnosis:	Hypercholesteremia  Assessment and plan of treatment:	continue home medication Zetia and Simvastatin  Secondary Diagnosis:	CAD (coronary artery disease)  Assessment and plan of treatment:	continue baby aspirin and Plavix Principal Discharge DX:	Diverticulitis  Goal:	resolution of symptoms  Assessment and plan of treatment:	Your CT scan showed you had diverticulitis. START CLINDAMYCIN 300MG THREE TIMES A DAY for 12 DAYS (available at your pharmacy). Stop taking Flagyl. Continue CIPROFLOXACIN (prescribed outpatient), an additional 2 days are available at your pharmacy, for a total of 12 DAYS. You were prescribed a probiotic to take with the antibiotics. Follow up with Dr. Dumas, your gastroenterologist, within the week. Follow up with your primary care doctor, Dr. Rodgers within the week. If you have problems with the antibiotics affect on you, contact infectious disease Dr. Davenport (number listed below).  Secondary Diagnosis:	Essential hypertension  Assessment and plan of treatment:	continue home medication Toprol XL, Lisinopril  Secondary Diagnosis:	Hypercholesteremia  Assessment and plan of treatment:	continue home medication Zetia and Simvastatin  Secondary Diagnosis:	CAD (coronary artery disease)  Assessment and plan of treatment:	continue baby aspirin and Plavix  Secondary Diagnosis:	Acute nonintractable headache, unspecified headache type  Assessment and plan of treatment:	You received a cat scan of your head during your hospitalization which was negative for acute processes. Follow up with neurologist, Dr. Edwards (number below), for outpatient work up of your headache. Principal Discharge DX:	Diverticulitis  Goal:	resolution of symptoms  Assessment and plan of treatment:	Your CT scan showed you had diverticulitis.   START CLINDAMYCIN 300MG THREE TIMES A DAY for 12 DAYS (available at your pharmacy).  - STOP  taking Flagyl.   -Continue CIPROFLOXACIN 500 mg 2x day x 12 days total (prescribed outpatient), an additional 2 days are available at your pharmacy, for a total of 12 DAYS.   You were prescribed a probiotic to take with the antibiotics x 4 weeks. Follow up with Dr. Dumas, your gastroenterologist, within the week. Follow up with your primary care doctor, Dr. Rodgers within the week. If you have problems with the antibiotics affect on you, contact infectious disease Dr. Davneport (number listed below).  Secondary Diagnosis:	Essential hypertension  Assessment and plan of treatment:	continue home medication Toprol XL, Lisinopril  Secondary Diagnosis:	Hypercholesteremia  Assessment and plan of treatment:	continue home medication Zetia and Simvastatin  Secondary Diagnosis:	CAD (coronary artery disease)  Assessment and plan of treatment:	continue baby aspirin and Plavix  Secondary Diagnosis:	Acute nonintractable headache, unspecified headache type  Assessment and plan of treatment:	You received a cat scan of your head during your hospitalization which was negative for acute processes. Follow up with neurologist, Dr. Edwards (number below), for outpatient work up of your headache.  Take Motrin 400 mg 3x day as needed with food for Headache.

## 2018-05-25 NOTE — PROGRESS NOTE ADULT - SUBJECTIVE AND OBJECTIVE BOX
Patient is a 63y old  Female who presents with a chief complaint of Diverticulitis (24 May 2018 02:32)      INTERVAL HPI: Patient is a 63 year old Female with PMHx of Hx NSTEMI IN 2012, CAD s/p 1 stent in [place] 2007 and 1 stent in [place] 2012, HTN, HLD, cervical cancer, hx of episode of Diverticulitis in 02/2018, presents to the Ed due to lower abdominal pain/pressure rated 5/10 nonradiating, weakness, nausea since this morning. She recently saw her GI Dr. Fregoso and was sent for Ct abdomen pelvis which showed uncomplicated sigmoid diverticulitis. She was given PO Cipro/Flagyl but could not tolerate the medications, they made her very nauseas and weak. She reports she has had 1 bout of diverticulitis in the past, occurred 3 months ago in 02/2018. She denies recent travel or sick contacts. She also reports headache moderate-severe in intensity, reports she had the same headache the last time she had diverticulitis. She denies dizziness, blurred vision, slurred speech, chest pain, palpitations, SOB, vomiting, diarrhea.     In the Ed, patient had temp 100.8, hr 96, /81, RR 16, Spo2 96% Ra. CBC, PT/INR, Lactate normal, Lipase and CMP within normal limits. CT abd/pelvic with oral contrast showed mild sigmoid diverticulitis, improved from prior. EKG showed sinus rhythm, left anterior fascicular block, prolonged , QTc 496, VR 91. Patient received 1 dose IV Flagyl, 1 L NS, Tylenol, Zofran. Blood cultures x 2 drawn. Patient was receiving dose of IV Cipro, ran over one hour, when there was 10 minutes remaining patient developed red rash on her chest, non-itchy, nonpainful. Patient denies shortness of breath. The Cipro was paused for the last ten minutes and not given the remainder.   Pt is NOT able to tolerate PO antibiotics & unable to take po food &  fluids.  5/24/18: Patient seen and examined at bedside. Reports improvement in weakness after IVF. States she continues to feel  weak, nauseous. Erythematous rash around upper chest has improved. Admits to continued headache. Pt states Po Flagyl makes her very sick, had similar experience in past, this time it is worse, unable to eat anything. BP low this AM.  5/25/18: Patient seen and examined at bedside, ambulating. Feels much better this AM, tolerating full liquid. Would like to advance her diet. Reports continued headache, relieved after receiving Ultram in the ED.     OVERNIGHT EVENTS:    Home Medications:  aspirin 81 mg oral tablet: 1 tab(s) orally once a day (08 Mar 2018 07:51)  lisinopril 20 mg oral tablet: 1 tab(s) orally once a day (08 Mar 2018 07:51)  Plavix 75 mg oral tablet: 1 tab(s) orally once a day (08 Mar 2018 07:51)  simvastatin 20 mg oral tablet: 1 tab(s) orally once a day (at bedtime) (08 Mar 2018 07:51)  Singulair 10 mg oral tablet: 1 tab(s) orally once a day (24 May 2018 02:52)  Toprol-XL 25 mg oral tablet, extended release: 1 tab(s) orally once a day (08 Mar 2018 07:51)  Zantac 150 oral tablet: 1 tab(s) orally 2 times a day (08 Mar 2018 07:51)  Zetia 10 mg oral tablet: 1 tab(s) orally once a day (08 Mar 2018 07:51)      MEDICATIONS  (STANDING):  aspirin enteric coated 81 milliGRAM(s) Oral daily  clopidogrel Tablet 75 milliGRAM(s) Oral daily  enoxaparin Injectable 40 milliGRAM(s) SubCutaneous daily  ertapenem  IVPB 1000 milliGRAM(s) IV Intermittent every 24 hours  famotidine    Tablet 20 milliGRAM(s) Oral daily  lisinopril 20 milliGRAM(s) Oral daily  metoprolol succinate ER 25 milliGRAM(s) Oral daily  montelukast 10 milliGRAM(s) Oral daily  simvastatin 20 milliGRAM(s) Oral at bedtime    MEDICATIONS  (PRN):  acetaminophen   Tablet. 650 milliGRAM(s) Oral every 6 hours PRN Mild Pain (1 - 3)  diphenhydrAMINE   Capsule 25 milliGRAM(s) Oral every 4 hours PRN Rash and/or Itching  ibuprofen  Tablet 400 milliGRAM(s) Oral every 6 hours PRN headache  ondansetron   Disintegrating Tablet 4 milliGRAM(s) Oral every 8 hours PRN Nausea and/or Vomiting      Allergies    codeine (Vomiting)  penicillin (Rash)    Intolerances        REVIEW OF SYSTEMS:  CONSTITUTIONAL: No fever, No chills, No fatigue, No myalgia, No Body ache, No Weakness  EYES: No eye pain,  No visual disturbances, No discharge, NO Redness  ENMT:  No ear pain, No nose bleed, No vertigo; No sinus or throat pain, No Congestion  NECK: No pain, No stiffness  RESPIRATORY: No cough, wheezing, No  hemoptysis, No shortness of breath  CARDIOVASCULAR: No chest pain, palpitations  GASTROINTESTINAL: No abdominal or epigastric pain. No nausea, No vomiting; No diarrhea or constipation. [  ] BM  GENITOURINARY: No dysuria, No frequency, No urgency, No hematuria, or incontinence  NEUROLOGICAL: No headaches, No dizziness, No numbness, No tingling, No tremors, No weakness  EXT: No Swelling, No Pain, No Edema  SKIN:  [ x ] No itching, burning, rashes, or lesions   MUSCULOSKELETAL: No joint pain or swelling; No muscle pain, No back pain, No extremity pain  PSYCHIATRIC: No depression, anxiety, mood swings or difficulty sleeping at night  PAIN SCALE: [  ] None  [ x ] Other- headache   ROS Unable to obtain due to - [  ] Dementia  [  ] Lethargy  [  ] Sedated [  ] non verbal  REST OF REVIEW Of SYSTEM - [  ] Normal     Vital Signs Last 24 Hrs  T(C): 36.9 (25 May 2018 07:35), Max: 37.3 (24 May 2018 20:46)  T(F): 98.5 (25 May 2018 07:35), Max: 99.1 (24 May 2018 20:46)  HR: 78 (25 May 2018 07:35) (70 - 80)  BP: 128/83 (25 May 2018 07:35) (102/60 - 132/81)  BP(mean): --  RR: 16 (25 May 2018 07:35) (16 - 16)  SpO2: 96% (25 May 2018 07:35) (95% - 99%)  Finger Stick        05-24 @ 07:01  -  05-25 @ 07:00  --------------------------------------------------------  IN: 2885 mL / OUT: 2900 mL / NET: -15 mL      PHYSICAL EXAM:  GENERAL:  [x  ] NAD , [ x] well appearing, [  ] Agitated, [  ] Drowsy,  [  ] Lethargy, [  ] confused   HEAD:  [x  ] Normal, [  ] Other  EYES:  [ x ] EOMI, [ x ] PERRLA, [x  ] conjunctiva and sclera clear normal, [  ] Other,  [  ] Pallor,[  ] Discharge  ENMT:  [x  ] Normal, [ x ] Moist mucous membranes, [ x ] Good dentition, [x  ] No Thrush  NECK:  [  x] Supple, [  ] No JVD, [ x ] Normal thyroid, [  ] Lymphadenopathy [  ] Other  CHEST/LUNG:  [x  ] Clear to auscultation bilaterally, [  ] Breath Sounds equal OR Decrease,  [x  ] No rales, [x  ] No rhonchi  [x  ]  No wheezing  HEART:  [ x ] Regular rate and rhythm, [  ] tachycardia, [  ] Bradycardia,  [  ] irregular  [ x ] No murmurs, No rubs, No gallops, [  ] PPM in place (Mfr:  )  ABDOMEN:  [x  ] Soft, [x  ] nontender, [ x ] Nondistended, [ x ] No mass, [ x ] Bowel sounds present, [  ] obese  NERVOUS SYSTEM:  [ x ] Alert & Oriented X3, [x ] Nonfocal  [  ] Confusion  [  ] Encephalopathic [  ] Sedated [  ] Unable to assess, [  ] Other-  EXTREMITIES: [x  ] 2+ Peripheral Pulses, No clubbing, No cyanosis,  [  ] edema B/L lower EXT. [  ] PVD stasis skin changes B/L Lower EXT  LYMPH: No lymphadenopathy noted  SKIN:  mild erythema in right chest/lower neck-improved    DIET: low fiber diet    LABS:                        13.8   4.23  )-----------( 234      ( 25 May 2018 08:31 )             40.2     25 May 2018 08:31    143    |  108    |  5      ----------------------------<  104    3.3     |  27     |  0.66     Ca    8.5        25 May 2018 08:31      PT/INR - ( 24 May 2018 01:50 )   PT: 12.4 sec;   INR: 1.13 ratio         PTT - ( 24 May 2018 01:50 )  PTT:27.8 sec      Culture Results:   No growth to date. (05-24 @ 08:51)  Culture Results:   No growth to date. (05-24 @ 08:51)      culture blood  -- .Blood Blood 05-24 @ 08:51    culture urine  --  05-24 @ 08:51              Culture - Blood (collected 24 May 2018 08:51)  Source: .Blood Blood  Preliminary Report (25 May 2018 09:01):    No growth to date.    Culture - Blood (collected 24 May 2018 08:51)  Source: .Blood Blood  Preliminary Report (25 May 2018 09:01):    No growth to date.         Anemia Panel:      Thyroid Panel:        Lipase, Serum: 108 U/L (05-24-18 @ 01:50)          RADIOLOGY & ADDITIONAL TESTS:      HEALTH ISSUES - PROBLEM Dx:  Need for prophylactic measure: Need for prophylactic measure  Hypercholesteremia: Hypercholesteremia  Essential hypertension: Essential hypertension  Coronary artery disease, angina presence unspecified, unspecified vessel or lesion type, unspecified whether native or transplanted heart: Coronary artery disease, angina presence unspecified, unspecified vessel or lesion type, unspecified whether native or transplanted heart  Rash: Rash  Prolonged QT interval: Prolonged QT interval  Acute nonintractable headache, unspecified headache type: Acute nonintractable headache, unspecified headache type  Diverticulitis: Diverticulitis  Sepsis, due to unspecified organism: Sepsis, due to unspecified organism          Consultant(s) Notes Reviewed:  [x  ] YES     Care Discussed with [X] Consultants  [ x ] Patient  [  ] Family  [  ]   [  ] Social Service  [ x ] RN, [  ] Physical Therapy  DVT PPX: [ x ] Lovenox, [  ] S C Heparin, [  ] Coumadin, [  ] Xarelto, [  ] Eliquis, [  ] Pradaxa, [  ] IV Heparin drip, [  ] SCD [  ] Contraindication 2 to GI Bleed,[  ] Ambulation  Advanced directive: [ x ] None, [  ] DNR/DNI Patient is a 63y old  Female who presents with a chief complaint of Diverticulitis (24 May 2018 02:32)      INTERVAL HPI: Patient is a 63 year old Female with PMHx of Hx NSTEMI IN 2012, CAD s/p 1 stent in [place] 2007 and 1 stent in [place] 2012, HTN, HLD, cervical cancer, hx of episode of Diverticulitis in 02/2018, presents to the Ed due to lower abdominal pain/pressure rated 5/10 nonradiating, weakness, nausea since this morning. She recently saw her GI Dr. Fregoso and was sent for Ct abdomen pelvis which showed uncomplicated sigmoid diverticulitis. She was given PO Cipro/Flagyl but could not tolerate the medications, they made her very nauseas and weak. She reports she has had 1 bout of diverticulitis in the past, occurred 3 months ago in 02/2018. She denies recent travel or sick contacts. She also reports headache moderate-severe in intensity, reports she had the same headache the last time she had diverticulitis. She denies dizziness, blurred vision, slurred speech, chest pain, palpitations, SOB, vomiting, diarrhea.     In the Ed, patient had temp 100.8, hr 96, /81, RR 16, Spo2 96% Ra. CBC, PT/INR, Lactate normal, Lipase and CMP within normal limits. CT abd/pelvic with oral contrast showed mild sigmoid diverticulitis, improved from prior. EKG showed sinus rhythm, left anterior fascicular block, prolonged , QTc 496, VR 91. Patient received 1 dose IV Flagyl, 1 L NS, Tylenol, Zofran. Blood cultures x 2 drawn. Patient was receiving dose of IV Cipro, ran over one hour, when there was 10 minutes remaining patient developed red rash on her chest, non-itchy, nonpainful. Patient denies shortness of breath. The Cipro was paused for the last ten minutes and not given the remainder.   Pt is NOT able to tolerate PO antibiotics & unable to take po food &  fluids.  5/24/18: Patient seen and examined at bedside. Reports improvement in weakness after IVF. States she continues to feel  weak, nauseous. Erythematous rash around upper chest has improved. Admits to continued headache. Pt states Po Flagyl makes her very sick, had similar experience in past, this time it is worse, unable to eat anything. BP low this AM.  5/25/18: Patient seen and examined at bedside, ambulating. Feels much better this AM, tolerating full liquid. advance to PO diet. Would like to advance her diet. Reports continued headache, relieved after receiving Ultram in the ED.  CT Head -NEG HA has resolved, feels good, better, ate & ambulated.    OVERNIGHT EVENTS: NONE    Home Medications:  aspirin 81 mg oral tablet: 1 tab(s) orally once a day (08 Mar 2018 07:51)  lisinopril 20 mg oral tablet: 1 tab(s) orally once a day (08 Mar 2018 07:51)  Plavix 75 mg oral tablet: 1 tab(s) orally once a day (08 Mar 2018 07:51)  simvastatin 20 mg oral tablet: 1 tab(s) orally once a day (at bedtime) (08 Mar 2018 07:51)  Singulair 10 mg oral tablet: 1 tab(s) orally once a day (24 May 2018 02:52)  Toprol-XL 25 mg oral tablet, extended release: 1 tab(s) orally once a day (08 Mar 2018 07:51)  Zantac 150 oral tablet: 1 tab(s) orally 2 times a day (08 Mar 2018 07:51)  Zetia 10 mg oral tablet: 1 tab(s) orally once a day (08 Mar 2018 07:51)      MEDICATIONS  (STANDING):  aspirin enteric coated 81 milliGRAM(s) Oral daily  clopidogrel Tablet 75 milliGRAM(s) Oral daily  enoxaparin Injectable 40 milliGRAM(s) SubCutaneous daily  ertapenem  IVPB 1000 milliGRAM(s) IV Intermittent every 24 hours  famotidine    Tablet 20 milliGRAM(s) Oral daily  lisinopril 20 milliGRAM(s) Oral daily  metoprolol succinate ER 25 milliGRAM(s) Oral daily  montelukast 10 milliGRAM(s) Oral daily  simvastatin 20 milliGRAM(s) Oral at bedtime    MEDICATIONS  (PRN):  acetaminophen   Tablet. 650 milliGRAM(s) Oral every 6 hours PRN Mild Pain (1 - 3)  diphenhydrAMINE   Capsule 25 milliGRAM(s) Oral every 4 hours PRN Rash and/or Itching  ibuprofen  Tablet 400 milliGRAM(s) Oral every 6 hours PRN headache  ondansetron   Disintegrating Tablet 4 milliGRAM(s) Oral every 8 hours PRN Nausea and/or Vomiting      Allergies    codeine (Vomiting)  penicillin (Rash)    Intolerances    REVIEW OF SYSTEMS:  I feel fine.  CONSTITUTIONAL: No fever, No chills, No fatigue, No myalgia, No Body ache, No Weakness  EYES: No eye pain,  No visual disturbances, No discharge, NO Redness  ENMT:  No ear pain, No nose bleed, No vertigo; No sinus or throat pain, No Congestion  NECK: No pain, No stiffness  RESPIRATORY: No cough, wheezing, No  hemoptysis, No shortness of breath  CARDIOVASCULAR: No chest pain, palpitations  GASTROINTESTINAL: No abdominal or epigastric pain. No nausea, No vomiting; No diarrhea or constipation. [  ] BM  GENITOURINARY: No dysuria, No frequency, No urgency, No hematuria, or incontinence  NEUROLOGICAL: No headaches, No dizziness, No numbness, No tingling, No tremors, No weakness  EXT: No Swelling, No Pain, No Edema  SKIN:  [ x ] No itching, burning, rashes, or lesions   MUSCULOSKELETAL: No joint pain or swelling; No muscle pain, No back pain, No extremity pain  PSYCHIATRIC: No depression, anxiety, mood swings or difficulty sleeping at night  PAIN SCALE: [  ] None  [ x ] Other- headache -resolved  ROS Unable to obtain due to - [  ] Dementia  [  ] Lethargy  [  ] Sedated [  ] non verbal  REST OF REVIEW Of SYSTEM - [ x ] Normal     Vital Signs Last 24 Hrs  T(C): 36.9 (25 May 2018 07:35), Max: 37.3 (24 May 2018 20:46)  T(F): 98.5 (25 May 2018 07:35), Max: 99.1 (24 May 2018 20:46)  HR: 78 (25 May 2018 07:35) (70 - 80)  BP: 128/83 (25 May 2018 07:35) (102/60 - 132/81)  BP(mean): --  RR: 16 (25 May 2018 07:35) (16 - 16)  SpO2: 96% (25 May 2018 07:35) (95% - 99%)  Finger Stick      05-24 @ 07:01  -  05-25 @ 07:00  --------------------------------------------------------  IN: 2885 mL / OUT: 2900 mL / NET: -15 mL      PHYSICAL EXAM:  GENERAL:  [x  ] NAD , [ x] well appearing, [  ] Agitated, [  ] Drowsy,  [  ] Lethargy, [  ] confused   HEAD:  [x  ] Normal, [  ] Other  EYES:  [ x ] EOMI, [ x ] PERRLA, [x  ] conjunctiva and sclera clear normal, [  ] Other,  [  ] Pallor,[  ] Discharge  ENMT:  [x  ] Normal, [ x ] Moist mucous membranes, [ x ] Good dentition, [x  ] No Thrush  NECK:  [  x] Supple, [x  ] No JVD, [ x ] Normal thyroid, [  ] Lymphadenopathy [  ] Other  CHEST/LUNG:  [x  ] Clear to auscultation bilaterally, [ x ] Breath Sounds equal B/L,  [x  ] No rales, [x  ] No rhonchi  [x  ]  No wheezing  HEART:  [ x ] Regular rate and rhythm, [  ] tachycardia, [  ] Bradycardia,  [  ] irregular  [ x ] No murmurs, No rubs, No gallops, [  ] PPM in place (Mfr:  )  ABDOMEN:  [x  ] Soft, [x  ] nontender, [ x ] Nondistended, [ x ] No mass, [ x ] Bowel sounds present, [  ] obese  NERVOUS SYSTEM:  [ x ] Alert & Oriented X3, [x ] Nonfocal  [  ] Confusion  [  ] Encephalopathic [  ] Sedated [  ] Unable to assess, [  ] Other-  EXTREMITIES: [x  ] 2+ Peripheral Pulses, No clubbing, No cyanosis,  [  ] edema B/L lower EXT. [  ] PVD stasis skin changes B/L Lower EXT  LYMPH: No lymphadenopathy noted  SKIN:  mild erythema in right chest/lower neck-improved    DIET: low fiber diet    LABS:                        13.8   4.23  )-----------( 234      ( 25 May 2018 08:31 )             40.2     25 May 2018 08:31    143    |  108    |  5      ----------------------------<  104    3.3     |  27     |  0.66     Ca    8.5        25 May 2018 08:31      PT/INR - ( 24 May 2018 01:50 )   PT: 12.4 sec;   INR: 1.13 ratio         PTT - ( 24 May 2018 01:50 )  PTT:27.8 sec      Culture Results:   No growth to date. (05-24 @ 08:51)  Culture Results:   No growth to date. (05-24 @ 08:51)      culture blood  -- .Blood Blood 05-24 @ 08:51    culture urine  --  05-24 @ 08:51      Culture - Blood (collected 24 May 2018 08:51)  Source: .Blood Blood  Preliminary Report (25 May 2018 09:01):    No growth to date.    Culture - Blood (collected 24 May 2018 08:51)  Source: .Blood Blood  Preliminary Report (25 May 2018 09:01):    No growth to date.      Lipase, Serum: 108 U/L (05-24-18 @ 01:50)      RADIOLOGY & ADDITIONAL TESTS: < from: CT Head No Cont (05.25.18 @ 12:56) >    EXAM:  CT BRAIN                            PROCEDURE DATE:  05/25/2018          INTERPRETATION:  .    CLINICAL INFORMATION: Headache    TECHNIQUE: Multiple axial CT images of the head were obtained without   contrast. Sagittal and coronal reconstructed images were acquired from   the source data.    COMPARISON: Most recent prior CT examination of the head from 3/8/2018.    FINDINGS: There is no acute intracranial hemorrhage, mass effect, shift   of the midline structures, herniation, extra-axial fluid collection, or   hydrocephalus.    There is diffuse cerebral volume loss with prominence of the sulci,   fissures, and cisternal spaces which is normal for the patient's age.   There is minimal deep and periventricular white matter hypoattenuation   statistically compatible with microvascular changes given calcific   atherosclerotic disease of the intracranial arteries. A dilated   Virchow-Roberto space is again noted within the right basal ganglia.    The paranasal sinuses and mastoid air cells are clear. The calvarium is   intact. The imaged orbits are unremarkable.    IMPRESSION: No acute intracranial hemorrhage, mass effect, or shift of   the midline structures.    No interval change when compared to the prior exam.      < end of copied text >    HEALTH ISSUES - PROBLEM Dx:  Need for prophylactic measure: Need for prophylactic measure  Hypercholesteremia: Hypercholesteremia  Essential hypertension: Essential hypertension  Coronary artery disease, angina presence unspecified, unspecified vessel or lesion type, unspecified whether native or transplanted heart: Coronary artery disease, angina presence unspecified, unspecified vessel or lesion type, unspecified whether native or transplanted heart  Rash: Rash  Prolonged QT interval: Prolonged QT interval  Acute nonintractable headache, unspecified headache type: Acute nonintractable headache, unspecified headache type  Diverticulitis: Diverticulitis  Sepsis, due to unspecified organism: Sepsis, due to unspecified organism      Consultant(s) Notes Reviewed:  [x  ] YES     Care Discussed with [X] Consultants  [ x ] Patient  [  ] Family  [  ]   [  ] Social Service  [ x ] RN, [  ] Physical Therapy  DVT PPX: [ x ] Lovenox, [  ] S C Heparin, [  ] Coumadin, [  ] Xarelto, [  ] Eliquis, [  ] Pradaxa, [  ] IV Heparin drip, [  ] SCD [  ] Contraindication 2 to GI Bleed,[  ] Ambulation  Advanced directive: [ x ] None, [  ] DNR/DNI

## 2018-05-29 LAB
CULTURE RESULTS: SIGNIFICANT CHANGE UP
CULTURE RESULTS: SIGNIFICANT CHANGE UP
SPECIMEN SOURCE: SIGNIFICANT CHANGE UP
SPECIMEN SOURCE: SIGNIFICANT CHANGE UP

## 2018-06-04 ENCOUNTER — APPOINTMENT (OUTPATIENT)
Dept: GASTROENTEROLOGY | Facility: CLINIC | Age: 63
End: 2018-06-04
Payer: COMMERCIAL

## 2018-06-04 VITALS
WEIGHT: 139 LBS | HEIGHT: 62 IN | SYSTOLIC BLOOD PRESSURE: 130 MMHG | TEMPERATURE: 98.3 F | DIASTOLIC BLOOD PRESSURE: 80 MMHG | HEART RATE: 65 BPM | BODY MASS INDEX: 25.58 KG/M2 | OXYGEN SATURATION: 98 %

## 2018-06-04 PROCEDURE — 99203 OFFICE O/P NEW LOW 30 MIN: CPT

## 2018-06-19 ENCOUNTER — APPOINTMENT (OUTPATIENT)
Dept: INTERNAL MEDICINE | Facility: CLINIC | Age: 63
End: 2018-06-19
Payer: COMMERCIAL

## 2018-06-19 VITALS
TEMPERATURE: 97.7 F | BODY MASS INDEX: 26.13 KG/M2 | SYSTOLIC BLOOD PRESSURE: 130 MMHG | HEIGHT: 62 IN | HEART RATE: 66 BPM | DIASTOLIC BLOOD PRESSURE: 76 MMHG | OXYGEN SATURATION: 98 % | RESPIRATION RATE: 16 BRPM | WEIGHT: 142 LBS

## 2018-06-19 DIAGNOSIS — Z88.9 ALLERGY STATUS TO UNSPECIFIED DRUGS, MEDICAMENTS AND BIOLOGICAL SUBSTANCES: ICD-10-CM

## 2018-06-19 PROCEDURE — 99214 OFFICE O/P EST MOD 30 MIN: CPT

## 2018-06-25 LAB
CHOLEST SERPL-MCNC: 136 MG/DL
CHOLEST/HDLC SERPL: 1.8 RATIO
HBA1C MFR BLD HPLC: 6 %
HDLC SERPL-MCNC: 75 MG/DL
LDLC SERPL CALC-MCNC: 50 MG/DL
TRIGL SERPL-MCNC: 55 MG/DL

## 2018-07-25 ENCOUNTER — OUTPATIENT (OUTPATIENT)
Dept: OUTPATIENT SERVICES | Facility: HOSPITAL | Age: 63
LOS: 1 days | End: 2018-07-25
Payer: COMMERCIAL

## 2018-07-25 ENCOUNTER — APPOINTMENT (OUTPATIENT)
Dept: MAMMOGRAPHY | Facility: CLINIC | Age: 63
End: 2018-07-25
Payer: COMMERCIAL

## 2018-07-25 DIAGNOSIS — Z00.8 ENCOUNTER FOR OTHER GENERAL EXAMINATION: ICD-10-CM

## 2018-07-25 PROCEDURE — 77063 BREAST TOMOSYNTHESIS BI: CPT

## 2018-07-25 PROCEDURE — 77067 SCR MAMMO BI INCL CAD: CPT | Mod: 26

## 2018-07-25 PROCEDURE — 76641 ULTRASOUND BREAST COMPLETE: CPT

## 2018-07-25 PROCEDURE — 77063 BREAST TOMOSYNTHESIS BI: CPT | Mod: 26

## 2018-07-25 PROCEDURE — 77067 SCR MAMMO BI INCL CAD: CPT

## 2018-07-25 PROCEDURE — 76641 ULTRASOUND BREAST COMPLETE: CPT | Mod: 26,50

## 2018-07-26 ENCOUNTER — APPOINTMENT (OUTPATIENT)
Dept: INTERNAL MEDICINE | Facility: CLINIC | Age: 63
End: 2018-07-26

## 2018-08-10 ENCOUNTER — RX RENEWAL (OUTPATIENT)
Age: 63
End: 2018-08-10

## 2018-08-24 ENCOUNTER — RX RENEWAL (OUTPATIENT)
Age: 63
End: 2018-08-24

## 2018-09-26 ENCOUNTER — RX RENEWAL (OUTPATIENT)
Age: 63
End: 2018-09-26

## 2018-10-01 ENCOUNTER — MEDICATION RENEWAL (OUTPATIENT)
Age: 63
End: 2018-10-01

## 2018-10-09 ENCOUNTER — NON-APPOINTMENT (OUTPATIENT)
Age: 63
End: 2018-10-09

## 2018-10-09 ENCOUNTER — APPOINTMENT (OUTPATIENT)
Dept: CARDIOLOGY | Facility: CLINIC | Age: 63
End: 2018-10-09
Payer: COMMERCIAL

## 2018-10-09 VITALS
OXYGEN SATURATION: 98 % | BODY MASS INDEX: 26.5 KG/M2 | HEIGHT: 62 IN | DIASTOLIC BLOOD PRESSURE: 95 MMHG | HEART RATE: 64 BPM | SYSTOLIC BLOOD PRESSURE: 169 MMHG | WEIGHT: 144 LBS

## 2018-10-09 VITALS — DIASTOLIC BLOOD PRESSURE: 80 MMHG | SYSTOLIC BLOOD PRESSURE: 130 MMHG

## 2018-10-09 PROCEDURE — 93000 ELECTROCARDIOGRAM COMPLETE: CPT

## 2018-10-09 PROCEDURE — 99214 OFFICE O/P EST MOD 30 MIN: CPT

## 2018-10-09 RX ORDER — CIPROFLOXACIN HYDROCHLORIDE 500 MG/1
500 TABLET, FILM COATED ORAL
Qty: 14 | Refills: 0 | Status: DISCONTINUED | COMMUNITY
Start: 2018-06-04 | End: 2018-10-09

## 2018-10-12 ENCOUNTER — TRANSCRIPTION ENCOUNTER (OUTPATIENT)
Age: 63
End: 2018-10-12

## 2018-10-25 ENCOUNTER — APPOINTMENT (OUTPATIENT)
Dept: MRI IMAGING | Facility: CLINIC | Age: 63
End: 2018-10-25

## 2018-10-25 ENCOUNTER — OUTPATIENT (OUTPATIENT)
Dept: OUTPATIENT SERVICES | Facility: HOSPITAL | Age: 63
LOS: 1 days | End: 2018-10-25
Payer: COMMERCIAL

## 2018-10-25 DIAGNOSIS — Z00.8 ENCOUNTER FOR OTHER GENERAL EXAMINATION: ICD-10-CM

## 2018-10-25 PROCEDURE — 73718 MRI LOWER EXTREMITY W/O DYE: CPT | Mod: 26,RT

## 2018-10-25 PROCEDURE — 73718 MRI LOWER EXTREMITY W/O DYE: CPT

## 2018-12-21 ENCOUNTER — RX RENEWAL (OUTPATIENT)
Age: 63
End: 2018-12-21

## 2019-01-03 ENCOUNTER — RX RENEWAL (OUTPATIENT)
Age: 64
End: 2019-01-03

## 2019-01-08 ENCOUNTER — RX RENEWAL (OUTPATIENT)
Age: 64
End: 2019-01-08

## 2019-01-15 ENCOUNTER — RX RENEWAL (OUTPATIENT)
Age: 64
End: 2019-01-15

## 2019-02-20 ENCOUNTER — OUTPATIENT (OUTPATIENT)
Dept: OUTPATIENT SERVICES | Facility: HOSPITAL | Age: 64
LOS: 1 days | End: 2019-02-20
Payer: COMMERCIAL

## 2019-02-20 VITALS
OXYGEN SATURATION: 98 % | HEIGHT: 61 IN | HEART RATE: 65 BPM | DIASTOLIC BLOOD PRESSURE: 70 MMHG | WEIGHT: 147.05 LBS | TEMPERATURE: 98 F | RESPIRATION RATE: 14 BRPM | SYSTOLIC BLOOD PRESSURE: 140 MMHG

## 2019-02-20 DIAGNOSIS — M25.871 OTHER SPECIFIED JOINT DISORDERS, RIGHT ANKLE AND FOOT: ICD-10-CM

## 2019-02-20 DIAGNOSIS — C53.9 MALIGNANT NEOPLASM OF CERVIX UTERI, UNSPECIFIED: Chronic | ICD-10-CM

## 2019-02-20 DIAGNOSIS — Z95.5 PRESENCE OF CORONARY ANGIOPLASTY IMPLANT AND GRAFT: ICD-10-CM

## 2019-02-20 DIAGNOSIS — T78.40XA ALLERGY, UNSPECIFIED, INITIAL ENCOUNTER: ICD-10-CM

## 2019-02-20 DIAGNOSIS — T14.8XXA OTHER INJURY OF UNSPECIFIED BODY REGION, INITIAL ENCOUNTER: ICD-10-CM

## 2019-02-20 LAB
ANION GAP SERPL CALC-SCNC: 12 MMO/L — SIGNIFICANT CHANGE UP (ref 7–14)
BUN SERPL-MCNC: 20 MG/DL — SIGNIFICANT CHANGE UP (ref 7–23)
CALCIUM SERPL-MCNC: 9.7 MG/DL — SIGNIFICANT CHANGE UP (ref 8.4–10.5)
CHLORIDE SERPL-SCNC: 101 MMOL/L — SIGNIFICANT CHANGE UP (ref 98–107)
CO2 SERPL-SCNC: 28 MMOL/L — SIGNIFICANT CHANGE UP (ref 22–31)
CREAT SERPL-MCNC: 0.73 MG/DL — SIGNIFICANT CHANGE UP (ref 0.5–1.3)
GLUCOSE SERPL-MCNC: 108 MG/DL — HIGH (ref 70–99)
HCT VFR BLD CALC: 44 % — SIGNIFICANT CHANGE UP (ref 34.5–45)
HGB BLD-MCNC: 14.2 G/DL — SIGNIFICANT CHANGE UP (ref 11.5–15.5)
MCHC RBC-ENTMCNC: 29.2 PG — SIGNIFICANT CHANGE UP (ref 27–34)
MCHC RBC-ENTMCNC: 32.3 % — SIGNIFICANT CHANGE UP (ref 32–36)
MCV RBC AUTO: 90.3 FL — SIGNIFICANT CHANGE UP (ref 80–100)
NRBC # FLD: 0 K/UL — LOW (ref 25–125)
PLATELET # BLD AUTO: 283 K/UL — SIGNIFICANT CHANGE UP (ref 150–400)
PMV BLD: 10.4 FL — SIGNIFICANT CHANGE UP (ref 7–13)
POTASSIUM SERPL-MCNC: 4.5 MMOL/L — SIGNIFICANT CHANGE UP (ref 3.5–5.3)
POTASSIUM SERPL-SCNC: 4.5 MMOL/L — SIGNIFICANT CHANGE UP (ref 3.5–5.3)
RBC # BLD: 4.87 M/UL — SIGNIFICANT CHANGE UP (ref 3.8–5.2)
RBC # FLD: 12.5 % — SIGNIFICANT CHANGE UP (ref 10.3–14.5)
SODIUM SERPL-SCNC: 141 MMOL/L — SIGNIFICANT CHANGE UP (ref 135–145)
WBC # BLD: 6.93 K/UL — SIGNIFICANT CHANGE UP (ref 3.8–10.5)
WBC # FLD AUTO: 6.93 K/UL — SIGNIFICANT CHANGE UP (ref 3.8–10.5)

## 2019-02-20 PROCEDURE — 93010 ELECTROCARDIOGRAM REPORT: CPT

## 2019-02-20 RX ORDER — SIMVASTATIN 20 MG/1
1 TABLET, FILM COATED ORAL
Qty: 0 | Refills: 0 | COMMUNITY

## 2019-02-20 RX ORDER — LISINOPRIL 2.5 MG/1
1 TABLET ORAL
Qty: 0 | Refills: 0 | COMMUNITY

## 2019-02-20 RX ORDER — METOPROLOL TARTRATE 50 MG
1 TABLET ORAL
Qty: 0 | Refills: 0 | COMMUNITY

## 2019-02-20 RX ORDER — ASPIRIN/CALCIUM CARB/MAGNESIUM 324 MG
1 TABLET ORAL
Qty: 0 | Refills: 0 | COMMUNITY

## 2019-02-20 RX ORDER — EZETIMIBE 10 MG/1
1 TABLET ORAL
Qty: 0 | Refills: 0 | COMMUNITY

## 2019-02-20 RX ORDER — MONTELUKAST 4 MG/1
1 TABLET, CHEWABLE ORAL
Qty: 0 | Refills: 0 | COMMUNITY

## 2019-02-20 RX ORDER — CLOPIDOGREL BISULFATE 75 MG/1
1 TABLET, FILM COATED ORAL
Qty: 0 | Refills: 0 | COMMUNITY

## 2019-02-20 RX ORDER — RANITIDINE HYDROCHLORIDE 150 MG/1
1 TABLET, FILM COATED ORAL
Qty: 0 | Refills: 0 | COMMUNITY

## 2019-02-20 NOTE — H&P PST ADULT - PMH
CAD (coronary artery disease)    Cervical cancer  with leep procedure, denies chemo/ radiation  Diverticulitis    Hypercholesteremia    Hypertension    S/P coronary artery stent placement  2007 in [place] and 2012 in [place]  Torn ligament  right 3rd and 4th metatarsal Allergy  PCN  CAD (coronary artery disease)    Cervical cancer  with leep procedure, denies chemo/ radiation  Diverticulitis    GERD (gastroesophageal reflux disease)    Hypercholesteremia    Hypertension    S/P coronary artery stent placement  2007 in [place] and 2012 in [place]  Seasonal allergies    Torn ligament  right 3rd and 4th metatarsal

## 2019-02-20 NOTE — H&P PST ADULT - HISTORY OF PRESENT ILLNESS
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 Intervention recommended. 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 Intervention recommended. Scheduled for right  3rd and 4th metatarsal osteotomy with plantar plate repair on 3-1-19 (right side confirmed with Nel in surgeon's office).

## 2019-02-20 NOTE — H&P PST ADULT - NSANTHOSAYNRD_GEN_A_CORE
No. KING screening performed.  STOP BANG Legend: 0-2 = LOW Risk; 3-4 = INTERMEDIATE Risk; 5-8 = HIGH Risk

## 2019-02-20 NOTE — H&P PST ADULT - PROBLEM SELECTOR PLAN 1
This is a  62 y/o female who is scheduled for right 3rd and 4th metatarsal osteotomy with plantar plate repair on 3-1-19  * Given pre op scrub cleanser

## 2019-02-20 NOTE — H&P PST ADULT - PROBLEM SELECTOR PLAN 2
Await cardiac evaluation with cardiologist as requested by surgeon  * Await old ekg for comparison  * Await stress test and echo with cardiologist in 2018  * Instructed to CONTINUE ASPIRIN. Cardiologist to determine when or if to stop plavix

## 2019-02-20 NOTE — H&P PST ADULT - PSH
Cervical cancer  LEEP  S/P angioplasty with stent   in LAD  and  in [place]  Status post bunionectomy  right with hardware  Status post

## 2019-02-21 PROBLEM — T14.8XXA OTHER INJURY OF UNSPECIFIED BODY REGION, INITIAL ENCOUNTER: Chronic | Status: ACTIVE | Noted: 2019-02-20

## 2019-02-22 ENCOUNTER — APPOINTMENT (OUTPATIENT)
Dept: INTERNAL MEDICINE | Facility: CLINIC | Age: 64
End: 2019-02-22
Payer: COMMERCIAL

## 2019-02-22 VITALS
DIASTOLIC BLOOD PRESSURE: 87 MMHG | HEIGHT: 62 IN | SYSTOLIC BLOOD PRESSURE: 164 MMHG | WEIGHT: 143 LBS | OXYGEN SATURATION: 94 % | BODY MASS INDEX: 26.31 KG/M2 | RESPIRATION RATE: 16 BRPM | TEMPERATURE: 97.7 F | HEART RATE: 75 BPM

## 2019-02-22 PROBLEM — K21.9 GASTRO-ESOPHAGEAL REFLUX DISEASE WITHOUT ESOPHAGITIS: Chronic | Status: ACTIVE | Noted: 2019-02-20

## 2019-02-22 PROBLEM — K57.92 DIVERTICULITIS OF INTESTINE, PART UNSPECIFIED, WITHOUT PERFORATION OR ABSCESS WITHOUT BLEEDING: Chronic | Status: ACTIVE | Noted: 2019-02-20

## 2019-02-22 PROBLEM — T78.40XA ALLERGY, UNSPECIFIED, INITIAL ENCOUNTER: Chronic | Status: ACTIVE | Noted: 2019-02-20

## 2019-02-22 PROBLEM — J30.2 OTHER SEASONAL ALLERGIC RHINITIS: Chronic | Status: ACTIVE | Noted: 2019-02-20

## 2019-02-22 PROCEDURE — 99214 OFFICE O/P EST MOD 30 MIN: CPT

## 2019-02-25 ENCOUNTER — APPOINTMENT (OUTPATIENT)
Dept: CARDIOLOGY | Facility: CLINIC | Age: 64
End: 2019-02-25

## 2019-03-08 ENCOUNTER — RX RENEWAL (OUTPATIENT)
Age: 64
End: 2019-03-08

## 2019-03-12 ENCOUNTER — OUTPATIENT (OUTPATIENT)
Dept: OUTPATIENT SERVICES | Facility: HOSPITAL | Age: 64
LOS: 1 days | End: 2019-03-12

## 2019-03-12 VITALS
WEIGHT: 145.95 LBS | DIASTOLIC BLOOD PRESSURE: 84 MMHG | RESPIRATION RATE: 16 BRPM | OXYGEN SATURATION: 97 % | TEMPERATURE: 98 F | HEART RATE: 69 BPM | SYSTOLIC BLOOD PRESSURE: 146 MMHG | HEIGHT: 61 IN

## 2019-03-12 DIAGNOSIS — C53.9 MALIGNANT NEOPLASM OF CERVIX UTERI, UNSPECIFIED: Chronic | ICD-10-CM

## 2019-03-12 DIAGNOSIS — I25.10 ATHEROSCLEROTIC HEART DISEASE OF NATIVE CORONARY ARTERY WITHOUT ANGINA PECTORIS: ICD-10-CM

## 2019-03-12 DIAGNOSIS — I10 ESSENTIAL (PRIMARY) HYPERTENSION: ICD-10-CM

## 2019-03-12 DIAGNOSIS — T14.8XXA OTHER INJURY OF UNSPECIFIED BODY REGION, INITIAL ENCOUNTER: ICD-10-CM

## 2019-03-12 DIAGNOSIS — M25.871 OTHER SPECIFIED JOINT DISORDERS, RIGHT ANKLE AND FOOT: ICD-10-CM

## 2019-03-12 NOTE — H&P PST ADULT - NSICDXFAMILYHX_GEN_ALL_CORE_FT
FAMILY HISTORY:  Father  Still living? No  Family history of heart disease, Age at diagnosis: Age Unknown  Family history of prostate cancer in father, Age at diagnosis: Age Unknown

## 2019-03-12 NOTE — H&P PST ADULT - ASSESSMENT
63 y.o. female with preop diagnosis of pain in right foot, disorder of ligament, right foot, pain in right ankle and joints of right foot, other bursitis, not elsewhere classified, right ankle and foot, metatarsalia, right foot

## 2019-03-12 NOTE — H&P PST ADULT - NEGATIVE ENMT SYMPTOMS
no throat pain/no sinus symptoms/no post-nasal discharge/no dysphagia/no hearing difficulty/no nasal congestion

## 2019-03-12 NOTE — H&P PST ADULT - HISTORY OF PRESENT ILLNESS
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 Intervention recommended. 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 Intervention recommended. Scheduled for right foot 3rd and 4th metatarsal osteotomy with plantar plate repair on 03/22/19    pt reports previously scheduled for surgery on 03/01/19, surgery postponed by patient due to cold sxs, treated with Zpack by PCP, resolved, denies changes in health since

## 2019-03-12 NOTE — H&P PST ADULT - ATTENDING COMMENTS
This patient presents today with chronically painful left bunion deformity with osteoarthritis plan for surgical repair. Discussed all risks, benefits, and alternatives to the procedures with no guarantees given nor implied regarding outcome of procedures.

## 2019-03-12 NOTE — H&P PST ADULT - NSICDXPROBLEM_GEN_ALL_CORE_FT
PROBLEM DIAGNOSES  Problem: Ligament tear  Assessment and Plan: pt scheduled for right foot 3rd and 4th metatarsal osteotomy with plantar plate repair on 03/22/19  Preop instructions provided. Pt verbalized understanding.   Pepcid for GI prophylaxis provided   Chlorhexidine wash with instructions provided.   allergy to PCN- OR booking notified, Right foot hardware-OR booking notified    Problem: HTN (hypertension)  Assessment and Plan: pt instructed to continue BP meds as prescribed and take lisinopril on the mornign of the procedure with a sip of water    Problem: CAD (coronary artery disease)  Assessment and Plan: x 2 stents  cardiac eval pending on 03/19/19 as per surgeon request, copy requested  pt to continue Aspirin, follow cardiologist instructions regarding last dose Plavix preop

## 2019-03-12 NOTE — H&P PST ADULT - NSICDXPASTSURGICALHX_GEN_ALL_CORE_FT
PAST SURGICAL HISTORY:  Cervical cancer LEEP    S/P angioplasty with stent  in [place]  and  in [place]    Status post bunionectomy right with hardware    Status post

## 2019-03-12 NOTE — H&P PST ADULT - ENMT COMMENTS
hx of cold sxs 2/22/19, tx with Zpac by PCP; resolved; pt denies sore throat, nasal congestion, cough or fever at present; reports hoarse voice

## 2019-03-12 NOTE — H&P PST ADULT - NSICDXPASTMEDICALHX_GEN_ALL_CORE_FT
PAST MEDICAL HISTORY:  Allergy PCN    CAD (coronary artery disease)     Cervical cancer with leep procedure, denies chemo/ radiation    Diverticulitis     GERD (gastroesophageal reflux disease)     Hypercholesteremia     Hypertension     S/P coronary artery stent placement 2007 in [place] and 2012 in [place]    Seasonal allergies     Torn ligament right 3rd and 4th metatarsal

## 2019-03-12 NOTE — H&P PST ADULT - NEGATIVE GENERAL GENITOURINARY SYMPTOMS
no flank pain R/no bladder infections/no hematuria/no incontinence/no flank pain L/no renal colic/no dysuria

## 2019-03-18 ENCOUNTER — APPOINTMENT (OUTPATIENT)
Dept: INTERNAL MEDICINE | Facility: CLINIC | Age: 64
End: 2019-03-18
Payer: COMMERCIAL

## 2019-03-18 VITALS
HEART RATE: 77 BPM | TEMPERATURE: 97.7 F | SYSTOLIC BLOOD PRESSURE: 139 MMHG | RESPIRATION RATE: 17 BRPM | WEIGHT: 142 LBS | BODY MASS INDEX: 26.13 KG/M2 | DIASTOLIC BLOOD PRESSURE: 87 MMHG | OXYGEN SATURATION: 96 % | HEIGHT: 62 IN

## 2019-03-18 PROCEDURE — 99243 OFF/OP CNSLTJ NEW/EST LOW 30: CPT

## 2019-03-18 RX ORDER — AZITHROMYCIN 250 MG/1
250 TABLET, FILM COATED ORAL
Qty: 1 | Refills: 0 | Status: DISCONTINUED | COMMUNITY
Start: 2019-02-22 | End: 2019-03-18

## 2019-03-18 RX ORDER — BENZONATATE 200 MG/1
200 CAPSULE ORAL
Qty: 90 | Refills: 0 | Status: DISCONTINUED | COMMUNITY
Start: 2019-02-24 | End: 2019-03-18

## 2019-03-18 RX ORDER — FLUTICASONE PROPIONATE 50 UG/1
50 SPRAY, METERED NASAL DAILY
Qty: 3 | Refills: 0 | Status: DISCONTINUED | COMMUNITY
Start: 2019-02-22 | End: 2019-03-18

## 2019-03-19 ENCOUNTER — APPOINTMENT (OUTPATIENT)
Dept: CARDIOLOGY | Facility: CLINIC | Age: 64
End: 2019-03-19
Payer: COMMERCIAL

## 2019-03-19 VITALS
SYSTOLIC BLOOD PRESSURE: 172 MMHG | WEIGHT: 144 LBS | DIASTOLIC BLOOD PRESSURE: 105 MMHG | HEART RATE: 84 BPM | HEIGHT: 62 IN | OXYGEN SATURATION: 96 % | BODY MASS INDEX: 26.5 KG/M2

## 2019-03-19 LAB
BASOPHILS # BLD AUTO: 0.03 K/UL
BASOPHILS NFR BLD AUTO: 0.7 %
EOSINOPHIL # BLD AUTO: 0.17 K/UL
EOSINOPHIL NFR BLD AUTO: 3.8 %
HCT VFR BLD CALC: 43.8 %
HGB BLD-MCNC: 14 G/DL
IMM GRANULOCYTES NFR BLD AUTO: 1.1 %
LYMPHOCYTES # BLD AUTO: 1.52 K/UL
LYMPHOCYTES NFR BLD AUTO: 33.9 %
MAN DIFF?: NORMAL
MCHC RBC-ENTMCNC: 29.8 PG
MCHC RBC-ENTMCNC: 32 GM/DL
MCV RBC AUTO: 93.2 FL
MONOCYTES # BLD AUTO: 0.46 K/UL
MONOCYTES NFR BLD AUTO: 10.2 %
NEUTROPHILS # BLD AUTO: 2.26 K/UL
NEUTROPHILS NFR BLD AUTO: 50.3 %
PLATELET # BLD AUTO: 305 K/UL
RBC # BLD: 4.7 M/UL
RBC # FLD: 12.7 %
WBC # FLD AUTO: 4.49 K/UL

## 2019-03-19 PROCEDURE — 99214 OFFICE O/P EST MOD 30 MIN: CPT

## 2019-03-20 ENCOUNTER — RX RENEWAL (OUTPATIENT)
Age: 64
End: 2019-03-20

## 2019-03-21 LAB
25(OH)D3 SERPL-MCNC: 36.6 NG/ML
ALBUMIN SERPL ELPH-MCNC: 4.6 G/DL
ALP BLD-CCNC: 78 U/L
ALT SERPL-CCNC: 24 U/L
ANION GAP SERPL CALC-SCNC: 10 MMOL/L
APPEARANCE: CLEAR
AST SERPL-CCNC: 25 U/L
BILIRUB SERPL-MCNC: 0.3 MG/DL
BILIRUBIN URINE: NEGATIVE
BLOOD URINE: NORMAL
BUN SERPL-MCNC: 19 MG/DL
CALCIUM SERPL-MCNC: 9.6 MG/DL
CHLORIDE SERPL-SCNC: 105 MMOL/L
CHOLEST SERPL-MCNC: 133 MG/DL
CHOLEST/HDLC SERPL: 1.9 RATIO
CO2 SERPL-SCNC: 27 MMOL/L
COLOR: COLORLESS
CREAT SERPL-MCNC: 0.74 MG/DL
CREAT SPEC-SCNC: 50 MG/DL
GLUCOSE QUALITATIVE U: NEGATIVE
GLUCOSE SERPL-MCNC: 104 MG/DL
HBA1C MFR BLD HPLC: 6 %
HDLC SERPL-MCNC: 72 MG/DL
KETONES URINE: NEGATIVE
LDLC SERPL CALC-MCNC: 49 MG/DL
LEUKOCYTE ESTERASE URINE: NEGATIVE
MICROALBUMIN 24H UR DL<=1MG/L-MCNC: <1.2 MG/DL
MICROALBUMIN/CREAT 24H UR-RTO: NORMAL MG/G
NITRITE URINE: NEGATIVE
PH URINE: 6
POTASSIUM SERPL-SCNC: 5.3 MMOL/L
PROT SERPL-MCNC: 6.9 G/DL
PROTEIN URINE: NEGATIVE
SODIUM SERPL-SCNC: 142 MMOL/L
SPECIFIC GRAVITY URINE: 1.01
TRIGL SERPL-MCNC: 58 MG/DL
TSH SERPL-ACNC: 0.67 UIU/ML
UROBILINOGEN URINE: NORMAL

## 2019-03-22 ENCOUNTER — OUTPATIENT (OUTPATIENT)
Dept: OUTPATIENT SERVICES | Facility: HOSPITAL | Age: 64
LOS: 1 days | Discharge: HOME | End: 2019-03-22
Payer: COMMERCIAL

## 2019-03-22 VITALS
SYSTOLIC BLOOD PRESSURE: 165 MMHG | OXYGEN SATURATION: 99 % | RESPIRATION RATE: 15 BRPM | HEART RATE: 62 BPM | TEMPERATURE: 98 F | DIASTOLIC BLOOD PRESSURE: 82 MMHG

## 2019-03-22 VITALS
OXYGEN SATURATION: 98 % | DIASTOLIC BLOOD PRESSURE: 83 MMHG | RESPIRATION RATE: 16 BRPM | WEIGHT: 145.95 LBS | SYSTOLIC BLOOD PRESSURE: 117 MMHG | HEART RATE: 69 BPM | HEIGHT: 61 IN | TEMPERATURE: 98 F

## 2019-03-22 DIAGNOSIS — C53.9 MALIGNANT NEOPLASM OF CERVIX UTERI, UNSPECIFIED: Chronic | ICD-10-CM

## 2019-03-22 DIAGNOSIS — M25.571 PAIN IN RIGHT ANKLE AND JOINTS OF RIGHT FOOT: ICD-10-CM

## 2019-03-22 PROCEDURE — 73630 X-RAY EXAM OF FOOT: CPT | Mod: 26,LT

## 2019-03-22 NOTE — ASU DISCHARGE PLAN (ADULT/PEDIATRIC) - ASU DC SPECIAL INSTRUCTIONSFT
Keep foot elevated. You may weight bear on foot . Keep surgical shoe on when ambulating. Keep dressings clean and dry

## 2019-03-22 NOTE — ASU DISCHARGE PLAN (ADULT/PEDIATRIC) - ACTIVITY LEVEL
Weight bearing as tolerated/No tub baths/No heavy lifting/No excercise Elevate extremity/No tub baths/No heavy lifting/Weight bearing as tolerated/No excercise

## 2019-03-22 NOTE — ASU PREOP CHECKLIST - VERIFY SURGICAL SITE/SIDE WITH PATIENT
Patient seen as walk-in for advice on whether doxazosin should be in her pill packs.    On home BP logs, BPs consistently high normal. Patient with no dangerously   done

## 2019-03-22 NOTE — ASU DISCHARGE PLAN (ADULT/PEDIATRIC) - CARE PROVIDER_API CALL
Terrance Lehman (DPM)  Podiatric Medicine and Surgery  [address] [facility] [address] [place] [address]  Phone: [phone]  Fax: [phone]  Follow Up Time:

## 2019-03-22 NOTE — ASU DISCHARGE PLAN (ADULT/PEDIATRIC) - CALL YOUR DOCTOR IF YOU HAVE ANY OF THE FOLLOWING:
Fever greater than (need to indicate Fahrenheit or Celsius)/Nausea and vomiting that does not stop/Pain not relieved by Medications/Swelling that gets worse/Bleeding that does not stop/Numbness, tingling, color or temperature change to extremity/Wound/Surgical Site with redness, or foul smelling discharge or pus Numbness, tingling, color or temperature change to extremity/Nausea and vomiting that does not stop/Fever greater than (need to indicate Fahrenheit or Celsius)/Wound/Surgical Site with redness, or foul smelling discharge or pus/Swelling that gets worse/Inability to tolerate liquids or foods/Bleeding that does not stop/Pain not relieved by Medications

## 2019-03-22 NOTE — ASU DISCHARGE PLAN (ADULT/PEDIATRIC) - FOLLOW UP APPOINTMENTS
911 or go to the nearest Emergency Room /[facility] ([facility]):

## 2019-03-22 NOTE — ASU DISCHARGE PLAN (ADULT/PEDIATRIC) - PAIN MANAGEMENT
Prescriptions electronically submitted to pharmacy from doctor's office Tylenol 1000mg every 6 hours as needed for pain. You were given Tylenol in Recovery Room and must wait until 10:45 pm

## 2019-03-25 ENCOUNTER — RX RENEWAL (OUTPATIENT)
Age: 64
End: 2019-03-25

## 2019-03-26 ENCOUNTER — RX RENEWAL (OUTPATIENT)
Age: 64
End: 2019-03-26

## 2019-04-01 ENCOUNTER — APPOINTMENT (OUTPATIENT)
Dept: INTERNAL MEDICINE | Facility: CLINIC | Age: 64
End: 2019-04-01
Payer: COMMERCIAL

## 2019-04-01 VITALS
OXYGEN SATURATION: 98 % | WEIGHT: 144 LBS | SYSTOLIC BLOOD PRESSURE: 130 MMHG | HEIGHT: 62 IN | HEART RATE: 69 BPM | BODY MASS INDEX: 26.5 KG/M2 | DIASTOLIC BLOOD PRESSURE: 80 MMHG | TEMPERATURE: 98.1 F | RESPIRATION RATE: 17 BRPM

## 2019-04-01 DIAGNOSIS — R22.2 LOCALIZED SWELLING, MASS AND LUMP, TRUNK: ICD-10-CM

## 2019-04-01 PROCEDURE — 99396 PREV VISIT EST AGE 40-64: CPT

## 2019-04-01 RX ORDER — BUDESONIDE AND FORMOTEROL FUMARATE DIHYDRATE 80; 4.5 UG/1; UG/1
80-4.5 AEROSOL RESPIRATORY (INHALATION) TWICE DAILY
Qty: 1 | Refills: 0 | Status: DISCONTINUED | COMMUNITY
Start: 2019-02-24 | End: 2019-04-01

## 2019-04-09 ENCOUNTER — RESULT REVIEW (OUTPATIENT)
Age: 64
End: 2019-04-09

## 2019-04-22 ENCOUNTER — TRANSCRIPTION ENCOUNTER (OUTPATIENT)
Age: 64
End: 2019-04-22

## 2019-07-16 ENCOUNTER — RX RENEWAL (OUTPATIENT)
Age: 64
End: 2019-07-16

## 2019-07-29 ENCOUNTER — APPOINTMENT (OUTPATIENT)
Dept: MAMMOGRAPHY | Facility: CLINIC | Age: 64
End: 2019-07-29
Payer: COMMERCIAL

## 2019-07-29 ENCOUNTER — OUTPATIENT (OUTPATIENT)
Dept: OUTPATIENT SERVICES | Facility: HOSPITAL | Age: 64
LOS: 1 days | End: 2019-07-29
Payer: COMMERCIAL

## 2019-07-29 ENCOUNTER — APPOINTMENT (OUTPATIENT)
Dept: ULTRASOUND IMAGING | Facility: CLINIC | Age: 64
End: 2019-07-29
Payer: COMMERCIAL

## 2019-07-29 DIAGNOSIS — C53.9 MALIGNANT NEOPLASM OF CERVIX UTERI, UNSPECIFIED: Chronic | ICD-10-CM

## 2019-07-29 DIAGNOSIS — Z12.31 ENCOUNTER FOR SCREENING MAMMOGRAM FOR MALIGNANT NEOPLASM OF BREAST: ICD-10-CM

## 2019-07-29 DIAGNOSIS — R92.2 INCONCLUSIVE MAMMOGRAM: ICD-10-CM

## 2019-07-29 PROCEDURE — 77067 SCR MAMMO BI INCL CAD: CPT

## 2019-07-29 PROCEDURE — 77063 BREAST TOMOSYNTHESIS BI: CPT | Mod: 26

## 2019-07-29 PROCEDURE — 77067 SCR MAMMO BI INCL CAD: CPT | Mod: 26

## 2019-07-29 PROCEDURE — 77063 BREAST TOMOSYNTHESIS BI: CPT

## 2019-07-29 PROCEDURE — 76641 ULTRASOUND BREAST COMPLETE: CPT | Mod: 26,50

## 2019-07-29 PROCEDURE — 76641 ULTRASOUND BREAST COMPLETE: CPT

## 2019-07-31 ENCOUNTER — RX RENEWAL (OUTPATIENT)
Age: 64
End: 2019-07-31

## 2019-07-31 ENCOUNTER — MEDICATION RENEWAL (OUTPATIENT)
Age: 64
End: 2019-07-31

## 2019-10-25 ENCOUNTER — MEDICATION RENEWAL (OUTPATIENT)
Age: 64
End: 2019-10-25

## 2019-11-11 ENCOUNTER — MEDICATION RENEWAL (OUTPATIENT)
Age: 64
End: 2019-11-11

## 2019-11-20 ENCOUNTER — MEDICATION RENEWAL (OUTPATIENT)
Age: 64
End: 2019-11-20

## 2019-12-10 ENCOUNTER — NON-APPOINTMENT (OUTPATIENT)
Age: 64
End: 2019-12-10

## 2019-12-10 ENCOUNTER — APPOINTMENT (OUTPATIENT)
Dept: INTERNAL MEDICINE | Facility: CLINIC | Age: 64
End: 2019-12-10
Payer: COMMERCIAL

## 2019-12-10 VITALS
HEIGHT: 62 IN | WEIGHT: 149 LBS | SYSTOLIC BLOOD PRESSURE: 144 MMHG | HEART RATE: 88 BPM | DIASTOLIC BLOOD PRESSURE: 86 MMHG | OXYGEN SATURATION: 97 % | BODY MASS INDEX: 27.42 KG/M2 | TEMPERATURE: 98.7 F

## 2019-12-10 PROCEDURE — 93000 ELECTROCARDIOGRAM COMPLETE: CPT

## 2019-12-10 PROCEDURE — 99213 OFFICE O/P EST LOW 20 MIN: CPT | Mod: 25

## 2019-12-10 RX ORDER — IBUPROFEN 600 MG/1
600 TABLET, FILM COATED ORAL
Qty: 18 | Refills: 0 | Status: DISCONTINUED | COMMUNITY
Start: 2019-09-05

## 2019-12-10 RX ORDER — DICLOFENAC SODIUM 10 MG/G
1 GEL TOPICAL
Qty: 100 | Refills: 0 | Status: DISCONTINUED | COMMUNITY
Start: 2018-12-04 | End: 2019-12-10

## 2019-12-10 RX ORDER — ANTIARTHRITIC COMBINATION NO.2 900 MG
5000 TABLET ORAL
Refills: 0 | Status: ACTIVE | COMMUNITY

## 2019-12-10 RX ORDER — BACILLUS COAGULANS/INULIN 1B-250 MG
CAPSULE ORAL
Refills: 0 | Status: ACTIVE | COMMUNITY

## 2019-12-10 RX ORDER — CHROMIUM 200 MCG
TABLET ORAL
Refills: 0 | Status: ACTIVE | COMMUNITY

## 2019-12-10 RX ORDER — ASCORBIC ACID 500 MG
TABLET ORAL
Refills: 0 | Status: ACTIVE | COMMUNITY

## 2019-12-10 RX ORDER — OFLOXACIN 3 MG/ML
0.3 SOLUTION/ DROPS OPHTHALMIC 4 TIMES DAILY
Qty: 1 | Refills: 0 | Status: DISCONTINUED | COMMUNITY
Start: 2019-02-22 | End: 2019-12-10

## 2019-12-10 NOTE — PLAN
[FreeTextEntry1] : Conservative treatment for URI. Increase hydration, use OTC remedies for symptoms such as warm liquids and Cepacol for sore throat, Tylenol for pain or fever, Flonase for congestion. Wash hands to avoid spreading illness. ECG showed no acute changes. f/u PRN and for yearly physical. If no improvement over next week advised to call back.

## 2019-12-10 NOTE — PHYSICAL EXAM
[No Acute Distress] : no acute distress [Well Developed] : well developed [Well Nourished] : well nourished [Well-Appearing] : well-appearing [Normal Sclera/Conjunctiva] : normal sclera/conjunctiva [EOMI] : extraocular movements intact [Normal Outer Ear/Nose] : the outer ears and nose were normal in appearance [PERRL] : pupils equal round and reactive to light [Normal Oropharynx] : the oropharynx was normal [Normal TMs] : both tympanic membranes were normal [Supple] : supple [No JVD] : no jugular venous distention [No Lymphadenopathy] : no lymphadenopathy [No Respiratory Distress] : no respiratory distress  [No Accessory Muscle Use] : no accessory muscle use [Thyroid Normal, No Nodules] : the thyroid was normal and there were no nodules present [Clear to Auscultation] : lungs were clear to auscultation bilaterally [Normal Rate] : normal rate  [Normal S1, S2] : normal S1 and S2 [Regular Rhythm] : with a regular rhythm [No Murmur] : no murmur heard [No Varicosities] : no varicosities [No Abdominal Bruit] : a ~M bruit was not heard ~T in the abdomen [No Edema] : there was no peripheral edema [No Palpable Aorta] : no palpable aorta [No Extremity Clubbing/Cyanosis] : no extremity clubbing/cyanosis [Normal Anterior Cervical Nodes] : no anterior cervical lymphadenopathy [Normal Posterior Cervical Nodes] : no posterior cervical lymphadenopathy [No Joint Swelling] : no joint swelling [No Rash] : no rash [Grossly Normal Strength/Tone] : grossly normal strength/tone [Normal Gait] : normal gait [No Focal Deficits] : no focal deficits [Coordination Grossly Intact] : coordination grossly intact [Normal Affect] : the affect was normal [Normal Insight/Judgement] : insight and judgment were intact [de-identified] : swollen nasal turbinates, right ear Q-tip trauma with slight erythema

## 2019-12-10 NOTE — HISTORY OF PRESENT ILLNESS
[FreeTextEntry8] : Pt is a 64 year F with PMH HTN, CAD with stent, GERD, and seasonal allergies presents for complaint of URI. Starting Sunday, pt felt a tickle in her throat and since last night had dry cough with chest heaviness which she feels is mucus. She thinks she may have had low grade fever but no recorded temperature. She complains also of headache which is somewhat controlled with Tylenol or caffeine. She was recently exposed to RSV from a 3 year old family member and was visiting hospital but states she wore PPE. Pt denies CP/SOB, fever/chills, n/v/d/c.

## 2019-12-10 NOTE — REVIEW OF SYSTEMS
[Fever] : fever [Sore Throat] : sore throat [Nasal Discharge] : nasal discharge [Postnasal Drip] : postnasal drip [Cough] : cough [Headache] : headache [Negative] : Heme/Lymph [Chills] : no chills [Fatigue] : no fatigue [Hot Flashes] : no hot flashes [Night Sweats] : no night sweats [Recent Change In Weight] : ~T no recent weight change [Earache] : no earache [Hearing Loss] : no hearing loss [Hoarseness] : no hoarseness [Nosebleed] : no nosebleeds [Shortness Of Breath] : no shortness of breath [Wheezing] : no wheezing [Dyspnea on Exertion] : no dyspnea on exertion [Dizziness] : no dizziness [Confusion] : no confusion [Fainting] : no fainting [Unsteady Walking] : no ataxia [Memory Loss] : no memory loss [FreeTextEntry6] : h

## 2019-12-14 ENCOUNTER — TRANSCRIPTION ENCOUNTER (OUTPATIENT)
Age: 64
End: 2019-12-14

## 2019-12-18 NOTE — H&P PST ADULT - NSANTHOSAYNRD_GEN_A_CORE
Problem: Respiratory Impairment - Respiratory Therapy 253  Intervention: Inhaled medication delivery  Intervention Status  Done  Intervention: Respiratory support devices  Intervention Status  Done         No. KING screening performed.  STOP BANG Legend: 0-2 = LOW Risk; 3-4 = INTERMEDIATE Risk; 5-8 = HIGH Risk

## 2019-12-26 ENCOUNTER — MEDICATION RENEWAL (OUTPATIENT)
Age: 64
End: 2019-12-26

## 2020-01-07 ENCOUNTER — TRANSCRIPTION ENCOUNTER (OUTPATIENT)
Age: 65
End: 2020-01-07

## 2020-01-07 ENCOUNTER — MEDICATION RENEWAL (OUTPATIENT)
Age: 65
End: 2020-01-07

## 2020-01-08 ENCOUNTER — TRANSCRIPTION ENCOUNTER (OUTPATIENT)
Age: 65
End: 2020-01-08

## 2020-01-22 ENCOUNTER — RX RENEWAL (OUTPATIENT)
Age: 65
End: 2020-01-22

## 2020-01-24 ENCOUNTER — RX RENEWAL (OUTPATIENT)
Age: 65
End: 2020-01-24

## 2020-02-27 ENCOUNTER — APPOINTMENT (OUTPATIENT)
Dept: CARDIOLOGY | Facility: CLINIC | Age: 65
End: 2020-02-27
Payer: COMMERCIAL

## 2020-02-27 ENCOUNTER — NON-APPOINTMENT (OUTPATIENT)
Age: 65
End: 2020-02-27

## 2020-02-27 VITALS
HEART RATE: 79 BPM | HEIGHT: 62 IN | SYSTOLIC BLOOD PRESSURE: 151 MMHG | BODY MASS INDEX: 28.34 KG/M2 | WEIGHT: 154 LBS | DIASTOLIC BLOOD PRESSURE: 91 MMHG | OXYGEN SATURATION: 96 %

## 2020-02-27 PROCEDURE — 99214 OFFICE O/P EST MOD 30 MIN: CPT

## 2020-02-27 PROCEDURE — 93000 ELECTROCARDIOGRAM COMPLETE: CPT

## 2020-02-27 RX ORDER — AZITHROMYCIN 250 MG/1
250 TABLET, FILM COATED ORAL
Qty: 1 | Refills: 0 | Status: DISCONTINUED | COMMUNITY
Start: 2020-01-07 | End: 2020-02-27

## 2020-03-10 ENCOUNTER — TRANSCRIPTION ENCOUNTER (OUTPATIENT)
Age: 65
End: 2020-03-10

## 2020-03-12 NOTE — H&P PST ADULT - SOURCE OF INFORMATION, PROFILE
Transplant SW: Writer met with pt in the outpatient transplant clinic for annual social work review. Pt is actively listed for kidney transplant. A&OX3. No other individuals present for visit. Pt lives in a second story apartment in [place]. Pt is independent with ADLs and household tasks. Pt ambulates without assistive device and drives a motor vehicle. Pt is not working, currently on disability. Insurance is Medicare. Pt reports that they do have a POA-HC document completed and on file at [facility]. Pt reports being independent at home with medications and sets up their own pillbox. Pt denies problems getting to scheduled medical appointments. Pt goes to dialysis at [facility] on MWF. Pt denies current use of tobacco products, alcohol, or illegal drugs. Denies current legal issues or mental health treatment.      Reviewed with pt the 24 hour support necessary after transplant. Pt continues to report that their Aunt Ale will be the primary support person and is able to drive to scheduled medical appointments. Pt states that he plans on staying with her in [place] post transplant and she will be able to provide 24 hour support because she works from home. Additionally, pt reports have many close friends that will be able to assist as needed.      All immediate social work related needs have been addressed. Pt continues to appear to be an acceptable candidate from a psychosocial perspective. Social work will remain available through the outpatient clinic should additional needs arise     patient/cell [phone]; home [phone]; can speak with Brad cornell

## 2020-04-25 ENCOUNTER — MESSAGE (OUTPATIENT)
Age: 65
End: 2020-04-25

## 2020-04-28 ENCOUNTER — TRANSCRIPTION ENCOUNTER (OUTPATIENT)
Age: 65
End: 2020-04-28

## 2020-05-06 ENCOUNTER — APPOINTMENT (OUTPATIENT)
Dept: DISASTER EMERGENCY | Facility: HOSPITAL | Age: 65
End: 2020-05-06

## 2020-05-07 LAB
SARS-COV-2 IGG SERPL IA-ACNC: <0.1 INDEX
SARS-COV-2 IGG SERPL QL IA: NEGATIVE

## 2020-05-08 ENCOUNTER — RX RENEWAL (OUTPATIENT)
Age: 65
End: 2020-05-08

## 2020-05-11 RX ORDER — RANITIDINE 150 MG/1
150 TABLET ORAL
Qty: 180 | Refills: 1 | Status: DISCONTINUED | COMMUNITY
Start: 2017-03-09 | End: 2020-05-11

## 2020-05-17 NOTE — ED ADULT NURSE NOTE - DURATION
Patient Education        Flank Pain: Care Instructions  Your Care Instructions  Flank pain is pain on the side of the back just below the rib cage and above the waist. It can be on one or both sides. Flank pain has many possible causes, including a kidney stone, a urinary tract infection, or back strain. Flank pain may get better on its own. But don't ignore new symptoms, such as fever, nausea and vomiting, urination problems, pain that gets worse, and dizziness. These may be signs of a more serious problem. You may have to have tests or other treatment. Follow-up care is a key part of your treatment and safety. Be sure to make and go to all appointments, and call your doctor if you are having problems. It's also a good idea to know your test results and keep a list of the medicines you take. How can you care for yourself at home? · Rest until you feel better. · Take pain medicines exactly as directed. ? If the doctor gave you a prescription medicine for pain, take it as prescribed. ? If you are not taking a prescription pain medicine, ask your doctor if you can take an over-the-counter pain medicine, such as acetaminophen (Tylenol), ibuprofen (Advil, Motrin), or naproxen (Aleve). Read and follow all instructions on the label. · If your doctor prescribed antibiotics, take them as directed. Do not stop taking them just because you feel better. You need to take the full course of antibiotics. · To apply heat, put a warm water bottle, a heating pad set on low, or a warm cloth on the painful area. Do not go to sleep with a heating pad on your skin. · To prevent dehydration, drink plenty of fluids, enough so that your urine is light yellow or clear like water. Choose water and other caffeine-free clear liquids until you feel better. If you have kidney, heart, or liver disease and have to limit fluids, talk with your doctor before you increase the amount of fluids you drink. When should you call for help?   Call your doctor now or seek immediate medical care if:    · Your flank pain gets worse.     · You have new symptoms, such as fever, nausea, or vomiting.     · You have symptoms of a urinary problem. For example:  ? You have blood or pus in your urine. ? You have chills or body aches. ? It hurts to urinate. ? You have groin or belly pain.    Watch closely for changes in your health, and be sure to contact your doctor if you do not get better as expected. Where can you learn more? Go to [email or web address]  Enter S191 in the search box to learn more about \"Flank Pain: Care Instructions. \"  Current as of: 2019Content Version: 12.4  © 9618-7830 Healthwise, Incorporated. Care instructions adapted under license by [employer] (which disclaims liability or warranty for this information). If you have questions about a medical condition or this instruction, always ask your healthcare professional. [place] [address] any warranty or liability for your use of this information. [employer] Activation    Thank you for requesting access to [employer]. Please follow the instructions below to securely access and download your online medical record. [employer] allows you to send messages to your doctor, view your test results, renew your prescriptions, schedule appointments, and more. How Do I Sign Up? 1. In your internet browser, go to [email or web address]  2. Click on the First Time User? Click Here link in the Sign In box. You will be redirect to the New Member Sign Up page. 3. Enter your [employer] Access Code exactly as it appears below. You will not need to use this code after youve completed the sign-up process. If you do not sign up before the expiration date, you must request a new code. [employer] Access Code: [email or web address]  Expires: 2020 11:44 AM (This is the date your [employer] access code will )    4.  Enter the last four digits of your Social Security Number (xxxx) and Date of Birth (mm/dd/yyyy) as indicated and click Submit. You will be taken to the next sign-up page. 5. Create a [employer] ID. This will be your [employer] login ID and cannot be changed, so think of one that is secure and easy to remember. 6. Create a [employer] password. You can change your password at any time. 7. Enter your Password Reset Question and Answer. This can be used at a later time if you forget your password. 8. Enter your e-mail address. You will receive e-mail notification when new information is available in [address] 9. Click Sign Up. You can now view and download portions of your medical record. 10. Click the Download Summary menu link to download a portable copy of your medical information. Additional Information    If you have questions, please visit the Frequently Asked Questions section of the [employer] website at [email or web address] Remember, [employer] is NOT to be used for urgent needs. For medical emergencies, dial 911. day(s)/3

## 2020-06-23 ENCOUNTER — RESULT REVIEW (OUTPATIENT)
Age: 65
End: 2020-06-23

## 2020-07-02 LAB
25(OH)D3 SERPL-MCNC: 46.1 NG/ML
ALBUMIN SERPL ELPH-MCNC: 4.6 G/DL
ALP BLD-CCNC: 76 U/L
ALT SERPL-CCNC: 25 U/L
ANION GAP SERPL CALC-SCNC: 15 MMOL/L
APPEARANCE: CLEAR
AST SERPL-CCNC: 28 U/L
BASOPHILS # BLD AUTO: 0.06 K/UL
BASOPHILS NFR BLD AUTO: 1 %
BILIRUB SERPL-MCNC: 0.3 MG/DL
BILIRUBIN URINE: NEGATIVE
BLOOD URINE: NEGATIVE
BUN SERPL-MCNC: 15 MG/DL
CALCIUM SERPL-MCNC: 9.8 MG/DL
CHLORIDE SERPL-SCNC: 100 MMOL/L
CHOLEST SERPL-MCNC: 157 MG/DL
CHOLEST/HDLC SERPL: 2.1 RATIO
CO2 SERPL-SCNC: 25 MMOL/L
COLOR: COLORLESS
CREAT SERPL-MCNC: 0.68 MG/DL
CREAT SPEC-SCNC: 21 MG/DL
EOSINOPHIL # BLD AUTO: 0.16 K/UL
EOSINOPHIL NFR BLD AUTO: 2.7 %
ESTIMATED AVERAGE GLUCOSE: 123 MG/DL
GLUCOSE QUALITATIVE U: NEGATIVE
GLUCOSE SERPL-MCNC: 86 MG/DL
HBA1C MFR BLD HPLC: 5.9 %
HCT VFR BLD CALC: 44.3 %
HDLC SERPL-MCNC: 76 MG/DL
HGB BLD-MCNC: 14.2 G/DL
IMM GRANULOCYTES NFR BLD AUTO: 0.3 %
KETONES URINE: NEGATIVE
LDLC SERPL CALC-MCNC: 70 MG/DL
LEUKOCYTE ESTERASE URINE: NEGATIVE
LYMPHOCYTES # BLD AUTO: 1.5 K/UL
LYMPHOCYTES NFR BLD AUTO: 25.6 %
MAN DIFF?: NORMAL
MCHC RBC-ENTMCNC: 30.2 PG
MCHC RBC-ENTMCNC: 32.1 GM/DL
MCV RBC AUTO: 94.3 FL
MICROALBUMIN 24H UR DL<=1MG/L-MCNC: <1.2 MG/DL
MICROALBUMIN/CREAT 24H UR-RTO: NORMAL MG/G
MONOCYTES # BLD AUTO: 0.54 K/UL
MONOCYTES NFR BLD AUTO: 9.2 %
NEUTROPHILS # BLD AUTO: 3.59 K/UL
NEUTROPHILS NFR BLD AUTO: 61.2 %
NITRITE URINE: NEGATIVE
PH URINE: 6.5
PLATELET # BLD AUTO: 286 K/UL
POTASSIUM SERPL-SCNC: 4.1 MMOL/L
PROT SERPL-MCNC: 7.3 G/DL
PROTEIN URINE: NEGATIVE
RBC # BLD: 4.7 M/UL
RBC # FLD: 13.5 %
SODIUM SERPL-SCNC: 140 MMOL/L
SPECIFIC GRAVITY URINE: 1.01
TRIGL SERPL-MCNC: 52 MG/DL
TSH SERPL-ACNC: 1.16 UIU/ML
UROBILINOGEN URINE: NORMAL
WBC # FLD AUTO: 5.87 K/UL

## 2020-07-17 ENCOUNTER — APPOINTMENT (OUTPATIENT)
Dept: INTERNAL MEDICINE | Facility: CLINIC | Age: 65
End: 2020-07-17
Payer: COMMERCIAL

## 2020-07-17 VITALS
HEIGHT: 62 IN | SYSTOLIC BLOOD PRESSURE: 163 MMHG | BODY MASS INDEX: 27.97 KG/M2 | HEART RATE: 72 BPM | DIASTOLIC BLOOD PRESSURE: 97 MMHG | RESPIRATION RATE: 14 BRPM | WEIGHT: 152 LBS | OXYGEN SATURATION: 97 % | TEMPERATURE: 98.5 F

## 2020-07-17 VITALS — DIASTOLIC BLOOD PRESSURE: 80 MMHG | SYSTOLIC BLOOD PRESSURE: 138 MMHG

## 2020-07-17 DIAGNOSIS — L91.8 OTHER HYPERTROPHIC DISORDERS OF THE SKIN: ICD-10-CM

## 2020-07-17 DIAGNOSIS — L57.0 ACTINIC KERATOSIS: ICD-10-CM

## 2020-07-17 DIAGNOSIS — L72.0 EPIDERMAL CYST: ICD-10-CM

## 2020-07-17 PROCEDURE — 99397 PER PM REEVAL EST PAT 65+ YR: CPT | Mod: 25

## 2020-07-17 PROCEDURE — 90471 IMMUNIZATION ADMIN: CPT

## 2020-07-17 PROCEDURE — 90670 PCV13 VACCINE IM: CPT

## 2020-07-23 ENCOUNTER — RX RENEWAL (OUTPATIENT)
Age: 65
End: 2020-07-23

## 2020-08-12 ENCOUNTER — APPOINTMENT (OUTPATIENT)
Dept: ULTRASOUND IMAGING | Facility: CLINIC | Age: 65
End: 2020-08-12
Payer: COMMERCIAL

## 2020-08-12 ENCOUNTER — APPOINTMENT (OUTPATIENT)
Dept: MAMMOGRAPHY | Facility: CLINIC | Age: 65
End: 2020-08-12
Payer: COMMERCIAL

## 2020-08-12 ENCOUNTER — OUTPATIENT (OUTPATIENT)
Dept: OUTPATIENT SERVICES | Facility: HOSPITAL | Age: 65
LOS: 1 days | End: 2020-08-12
Payer: COMMERCIAL

## 2020-08-12 ENCOUNTER — APPOINTMENT (OUTPATIENT)
Dept: RADIOLOGY | Facility: CLINIC | Age: 65
End: 2020-08-12
Payer: COMMERCIAL

## 2020-08-12 DIAGNOSIS — Z78.0 ASYMPTOMATIC MENOPAUSAL STATE: ICD-10-CM

## 2020-08-12 DIAGNOSIS — C53.9 MALIGNANT NEOPLASM OF CERVIX UTERI, UNSPECIFIED: Chronic | ICD-10-CM

## 2020-08-12 PROCEDURE — 77080 DXA BONE DENSITY AXIAL: CPT | Mod: 26

## 2020-08-12 PROCEDURE — 77063 BREAST TOMOSYNTHESIS BI: CPT | Mod: 26

## 2020-08-12 PROCEDURE — 77067 SCR MAMMO BI INCL CAD: CPT | Mod: 26

## 2020-08-12 PROCEDURE — 76641 ULTRASOUND BREAST COMPLETE: CPT

## 2020-08-12 PROCEDURE — 77080 DXA BONE DENSITY AXIAL: CPT

## 2020-08-12 PROCEDURE — 76641 ULTRASOUND BREAST COMPLETE: CPT | Mod: 26,50

## 2020-08-12 PROCEDURE — 77063 BREAST TOMOSYNTHESIS BI: CPT

## 2020-08-12 PROCEDURE — 77067 SCR MAMMO BI INCL CAD: CPT

## 2020-08-13 ENCOUNTER — TRANSCRIPTION ENCOUNTER (OUTPATIENT)
Age: 65
End: 2020-08-13

## 2020-08-14 ENCOUNTER — TRANSCRIPTION ENCOUNTER (OUTPATIENT)
Age: 65
End: 2020-08-14

## 2020-10-01 ENCOUNTER — TRANSCRIPTION ENCOUNTER (OUTPATIENT)
Age: 65
End: 2020-10-01

## 2020-10-13 ENCOUNTER — APPOINTMENT (OUTPATIENT)
Dept: INTERNAL MEDICINE | Facility: CLINIC | Age: 65
End: 2020-10-13
Payer: COMMERCIAL

## 2020-10-13 DIAGNOSIS — J02.9 ACUTE PHARYNGITIS, UNSPECIFIED: ICD-10-CM

## 2020-10-13 PROCEDURE — 99214 OFFICE O/P EST MOD 30 MIN: CPT | Mod: 95

## 2020-11-03 ENCOUNTER — RX RENEWAL (OUTPATIENT)
Age: 65
End: 2020-11-03

## 2020-11-23 ENCOUNTER — RX RENEWAL (OUTPATIENT)
Age: 65
End: 2020-11-23

## 2020-12-01 ENCOUNTER — NON-APPOINTMENT (OUTPATIENT)
Age: 65
End: 2020-12-01

## 2020-12-01 ENCOUNTER — APPOINTMENT (OUTPATIENT)
Dept: CARDIOLOGY | Facility: CLINIC | Age: 65
End: 2020-12-01
Payer: COMMERCIAL

## 2020-12-01 VITALS
BODY MASS INDEX: 29.53 KG/M2 | WEIGHT: 160.5 LBS | HEIGHT: 62 IN | DIASTOLIC BLOOD PRESSURE: 84 MMHG | HEART RATE: 70 BPM | SYSTOLIC BLOOD PRESSURE: 151 MMHG | OXYGEN SATURATION: 96 %

## 2020-12-01 PROCEDURE — 99072 ADDL SUPL MATRL&STAF TM PHE: CPT

## 2020-12-01 PROCEDURE — 93000 ELECTROCARDIOGRAM COMPLETE: CPT

## 2020-12-01 PROCEDURE — 99214 OFFICE O/P EST MOD 30 MIN: CPT

## 2020-12-08 ENCOUNTER — NON-APPOINTMENT (OUTPATIENT)
Age: 65
End: 2020-12-08

## 2020-12-22 ENCOUNTER — TRANSCRIPTION ENCOUNTER (OUTPATIENT)
Age: 65
End: 2020-12-22

## 2021-01-08 ENCOUNTER — TRANSCRIPTION ENCOUNTER (OUTPATIENT)
Age: 66
End: 2021-01-08

## 2021-01-14 ENCOUNTER — APPOINTMENT (OUTPATIENT)
Dept: CARDIOLOGY | Facility: CLINIC | Age: 66
End: 2021-01-14
Payer: COMMERCIAL

## 2021-01-14 ENCOUNTER — NON-APPOINTMENT (OUTPATIENT)
Age: 66
End: 2021-01-14

## 2021-01-14 VITALS
BODY MASS INDEX: 29.08 KG/M2 | DIASTOLIC BLOOD PRESSURE: 97 MMHG | HEART RATE: 66 BPM | HEIGHT: 62 IN | SYSTOLIC BLOOD PRESSURE: 177 MMHG | WEIGHT: 158 LBS | OXYGEN SATURATION: 97 %

## 2021-01-14 PROCEDURE — 93000 ELECTROCARDIOGRAM COMPLETE: CPT

## 2021-01-14 PROCEDURE — 99072 ADDL SUPL MATRL&STAF TM PHE: CPT

## 2021-01-14 PROCEDURE — 99215 OFFICE O/P EST HI 40 MIN: CPT

## 2021-01-15 LAB
CK MB BLD-MCNC: 1.9 NG/ML
CK SERPL-CCNC: 83 U/L
TROPONIN-T, HIGH SENSITIVITY: <6 NG/L

## 2021-01-26 ENCOUNTER — RX RENEWAL (OUTPATIENT)
Age: 66
End: 2021-01-26

## 2021-01-29 ENCOUNTER — NON-APPOINTMENT (OUTPATIENT)
Age: 66
End: 2021-01-29

## 2021-02-01 ENCOUNTER — APPOINTMENT (OUTPATIENT)
Dept: CARDIOLOGY | Facility: CLINIC | Age: 66
End: 2021-02-01

## 2021-02-08 ENCOUNTER — TRANSCRIPTION ENCOUNTER (OUTPATIENT)
Age: 66
End: 2021-02-08

## 2021-02-16 ENCOUNTER — APPOINTMENT (OUTPATIENT)
Dept: INTERNAL MEDICINE | Facility: CLINIC | Age: 66
End: 2021-02-16
Payer: COMMERCIAL

## 2021-02-16 ENCOUNTER — NON-APPOINTMENT (OUTPATIENT)
Age: 66
End: 2021-02-16

## 2021-02-16 VITALS — DIASTOLIC BLOOD PRESSURE: 78 MMHG | SYSTOLIC BLOOD PRESSURE: 139 MMHG

## 2021-02-16 PROCEDURE — 99214 OFFICE O/P EST MOD 30 MIN: CPT | Mod: 95

## 2021-02-17 ENCOUNTER — APPOINTMENT (OUTPATIENT)
Dept: CARDIOLOGY | Facility: CLINIC | Age: 66
End: 2021-02-17
Payer: COMMERCIAL

## 2021-02-17 ENCOUNTER — TRANSCRIPTION ENCOUNTER (OUTPATIENT)
Age: 66
End: 2021-02-17

## 2021-02-17 PROCEDURE — 99072 ADDL SUPL MATRL&STAF TM PHE: CPT

## 2021-02-17 PROCEDURE — 93306 TTE W/DOPPLER COMPLETE: CPT

## 2021-02-17 PROCEDURE — 93015 CV STRESS TEST SUPVJ I&R: CPT

## 2021-02-17 PROCEDURE — A9500: CPT

## 2021-02-17 PROCEDURE — 78452 HT MUSCLE IMAGE SPECT MULT: CPT

## 2021-03-03 ENCOUNTER — TRANSCRIPTION ENCOUNTER (OUTPATIENT)
Age: 66
End: 2021-03-03

## 2021-03-03 LAB
25(OH)D3 SERPL-MCNC: 39.5 NG/ML
ALBUMIN SERPL ELPH-MCNC: 4.6 G/DL
ALP BLD-CCNC: 97 U/L
ALT SERPL-CCNC: 25 U/L
ANION GAP SERPL CALC-SCNC: 10 MMOL/L
APPEARANCE: CLEAR
AST SERPL-CCNC: 23 U/L
BILIRUB SERPL-MCNC: 0.2 MG/DL
BILIRUBIN URINE: NEGATIVE
BLOOD URINE: NEGATIVE
BUN SERPL-MCNC: 12 MG/DL
CALCIUM SERPL-MCNC: 10 MG/DL
CHLORIDE SERPL-SCNC: 103 MMOL/L
CHOLEST SERPL-MCNC: 169 MG/DL
CO2 SERPL-SCNC: 29 MMOL/L
COLOR: COLORLESS
CREAT SERPL-MCNC: 0.77 MG/DL
ESTIMATED AVERAGE GLUCOSE: 126 MG/DL
GLUCOSE QUALITATIVE U: NEGATIVE
GLUCOSE SERPL-MCNC: 104 MG/DL
HBA1C MFR BLD HPLC: 6 %
HDLC SERPL-MCNC: 74 MG/DL
KETONES URINE: NEGATIVE
LDLC SERPL CALC-MCNC: 80 MG/DL
LEUKOCYTE ESTERASE URINE: NEGATIVE
NITRITE URINE: NEGATIVE
NONHDLC SERPL-MCNC: 95 MG/DL
PH URINE: 6.5
POTASSIUM SERPL-SCNC: 4.4 MMOL/L
PROT SERPL-MCNC: 7.5 G/DL
PROTEIN URINE: NEGATIVE
SODIUM SERPL-SCNC: 143 MMOL/L
SPECIFIC GRAVITY URINE: 1
TRIGL SERPL-MCNC: 79 MG/DL
UROBILINOGEN URINE: NORMAL

## 2021-04-02 ENCOUNTER — RX RENEWAL (OUTPATIENT)
Age: 66
End: 2021-04-02

## 2021-04-05 ENCOUNTER — APPOINTMENT (OUTPATIENT)
Dept: GASTROENTEROLOGY | Facility: CLINIC | Age: 66
End: 2021-04-05
Payer: COMMERCIAL

## 2021-04-05 VITALS
OXYGEN SATURATION: 97 % | SYSTOLIC BLOOD PRESSURE: 156 MMHG | DIASTOLIC BLOOD PRESSURE: 85 MMHG | WEIGHT: 155 LBS | BODY MASS INDEX: 28.52 KG/M2 | HEART RATE: 64 BPM | HEIGHT: 62 IN

## 2021-04-05 DIAGNOSIS — K63.5 POLYP OF COLON: ICD-10-CM

## 2021-04-05 DIAGNOSIS — Z80.42 FAMILY HISTORY OF MALIGNANT NEOPLASM OF PROSTATE: ICD-10-CM

## 2021-04-05 PROCEDURE — 99215 OFFICE O/P EST HI 40 MIN: CPT

## 2021-04-05 PROCEDURE — 99205 OFFICE O/P NEW HI 60 MIN: CPT

## 2021-04-05 PROCEDURE — 99072 ADDL SUPL MATRL&STAF TM PHE: CPT

## 2021-04-05 NOTE — HISTORY OF PRESENT ILLNESS
[de-identified] : 65 y/o mother of three, with Hx of CAD s/p PCI to the proximal LAD with a Taxus stent in 2007, PCI to the RI in March of 2012 in the setting of a mild NSTEMI, on aspirin/Plavix, hyperlipidemia, and hypertension, diverticulitis who presents requesting a colonoscopy.  \par \par She says she had only one attack of diverticulitis.  She recalls on Feb, 2018, while at work she started having lower abdominal pain that progressively worsened, she went to ER and diagnosed with diverticulitis - she was discharged from ER to complete Cipro and Flagyl.  She says she had a "terrible" reaction to Flagyl - she felt weak, faint, couldn't walk.  She went back to ER because she was feeling weak - was eventually told that perhaps she is allergic to Flagyl. \par \par Since then gets "twinges" in lower abdomen every now and then. \par \par She had a colonoscopy in 4/2018, and was found to have a 1 cm polyp in the rectum that was removed, 8 mm descending colon polyp removed.  There was sigmoid diverticulosis.  Both polyps were hyperplastic.  She was advised to have a repeat colonoscopy in 3 years. \par \par She says she was tested for celiac disease in the past and it was negative. \par Last couple of months, she has been feeling bloated.  \par \par Abdomen distends and can get hard - she takes chamomile tea which helps with bloating, with the distension and hardness.  \par \par Gets nausea when feeling bloated but does not vomit. \par  \par She takes beano and prebiotic and probiotics - she is not sure if it has been helping with her bloating. \par \par Recently she has been having difficulty moving her bowels, abdominal cramps - she took a laxative and then felt faint. \par \par She has a bowel movement once a day - when she has vegetables shakes she has better bowel movements. \par \par No melena and no hematochezia. \par No loss of appetite, no abnormal weight loss. \par \par Takes Pepcid twice a day for years - originally on Zantac - she was started on acid reduces 7 years because at that time she experiencing heartburn.  She has not taken herself off of H2 blockers.    She says rarely gets heartburn. \par \par 2 months ago she saw her cardiologist  and underwent stress test and echo. \par \par On Oct 2014 she had an upper endoscopy and found to have mild gastritis, and mild duodenitis.   \par \par Sister has had recurrent diverticulitis.  Father did not have colon cancer - only prostate cancer. Paternal aunt had esophageal cancer.

## 2021-04-05 NOTE — PHYSICAL EXAM
[General Appearance - Alert] : alert [General Appearance - In No Acute Distress] : in no acute distress [Sclera] : the sclera and conjunctiva were normal [Extraocular Movements] : extraocular movements were intact [Outer Ear] : the ears and nose were normal in appearance [Hearing Threshold Finger Rub Not [place]] : hearing was normal [Neck Appearance] : the appearance of the neck was normal [Neck Cervical Mass (___cm)] : no neck mass was observed [Auscultation Breath Sounds / Voice Sounds] : lungs were clear to auscultation bilaterally [Heart Rate And Rhythm] : heart rate was normal and rhythm regular [Heart Sounds] : normal S1 and S2 [Heart Sounds Gallop] : no gallops [Murmurs] : no murmurs [Heart Sounds Pericardial Friction Rub] : no pericardial rub [Bowel Sounds] : normal bowel sounds [Abdomen Soft] : soft [Abdomen Tenderness] : non-tender [Cervical Lymph Nodes Enlarged Posterior Bilaterally] : posterior cervical [Supraclavicular Lymph Nodes Enlarged Bilaterally] : supraclavicular [Abnormal Walk] : normal gait [Nail Clubbing] : no clubbing  or cyanosis of the fingernails [Skin Color & Pigmentation] : normal skin color and pigmentation [] : no rash [Oriented To Time, Place, And Person] : oriented to person, place, and time [Impaired Insight] : insight and judgment were intact [Affect] : the affect was normal [FreeTextEntry1] : Lower abdominal scar

## 2021-04-05 NOTE — ASSESSMENT
[FreeTextEntry1] : IMPRESSION: \par 1.  Abdominal bloating/abdominal distention - benign abdominal exam\par 2.  Recently having difficulty moving her bowels - takes a stool softener at night \par 3.  History of diverticulitis in Feb 2018 - treated with oral abx\par 4.  Colonoscopy in in 4/2018 - two polyps were removed - largest polyp was 1 cm  - hyperplastic polyps.   Repeat colonoscopy in 4/2021 recommended. \par 5.  History of acid reflux - on H2 blockers since past 7 years \par 6.  EGD in 2014 - mild gastritis and mild duodenitis. \par 7.  Paternal aunt had esophageal cancer \par 8.  CAD s/p PCI to the proximal LAD with a Taxus stent in 2007, PCI to the RI in March of 2012 in the setting of a mild NSTEMI, on aspirin/Plavix, hyperlipidemia, and hypertension\par \par \par \par PLAN:\par I have advised the patient to arrange for a colonoscopy to rule out colon polyps, colorectal cancer etc. under monitored anesthesia care.  Risks such as perforation  (4 in 10,000) requiring surgery, bleeding (8 in 10,000), infection, diverticulitis, colitis, missed colon cancer (2% to 6%), internal organ injury, etc, risks of bowel prep including colitis, syncope, adverse reaction to medication etc. and risks of anesthesia including cardiopulmonary compromise were discussed with patient.  Patient verbalized understanding and agrees to proceed with the planned procedure.\par \par I will plan for an upper endoscopy under monitored anesthesia care to rule out peptic ulcer disease, H.pylori gastritis etc.   Risks, benefits, and alternatives of the procedure were discussed with the patient. Patient understands and agrees to proceed with the planned procedure.  \par \par She would need to hold off on Plavix 5 days prior to upper endoscopy and colonoscopy - advised to continue aspirin even on day of procedure. She would need cardiology clearance first. \par

## 2021-04-06 ENCOUNTER — NON-APPOINTMENT (OUTPATIENT)
Age: 66
End: 2021-04-06

## 2021-04-30 ENCOUNTER — RX RENEWAL (OUTPATIENT)
Age: 66
End: 2021-04-30

## 2021-05-19 ENCOUNTER — TRANSCRIPTION ENCOUNTER (OUTPATIENT)
Age: 66
End: 2021-05-19

## 2021-05-19 RX ORDER — AMLODIPINE BESYLATE 5 MG/1
5 TABLET ORAL DAILY
Qty: 90 | Refills: 3 | Status: DISCONTINUED | COMMUNITY
Start: 2021-01-14 | End: 2021-05-19

## 2021-06-02 DIAGNOSIS — Z86.010 PERSONAL HISTORY OF COLONIC POLYPS: ICD-10-CM

## 2021-06-14 ENCOUNTER — NON-APPOINTMENT (OUTPATIENT)
Age: 66
End: 2021-06-14

## 2021-06-14 ENCOUNTER — APPOINTMENT (OUTPATIENT)
Dept: CARDIOLOGY | Facility: CLINIC | Age: 66
End: 2021-06-14
Payer: COMMERCIAL

## 2021-06-14 VITALS
BODY MASS INDEX: 28.52 KG/M2 | WEIGHT: 155 LBS | SYSTOLIC BLOOD PRESSURE: 161 MMHG | HEART RATE: 70 BPM | OXYGEN SATURATION: 99 % | DIASTOLIC BLOOD PRESSURE: 86 MMHG | HEIGHT: 62 IN

## 2021-06-14 PROCEDURE — 99214 OFFICE O/P EST MOD 30 MIN: CPT

## 2021-06-14 PROCEDURE — 93000 ELECTROCARDIOGRAM COMPLETE: CPT

## 2021-06-14 PROCEDURE — 99072 ADDL SUPL MATRL&STAF TM PHE: CPT

## 2021-06-17 ENCOUNTER — EMERGENCY (EMERGENCY)
Facility: HOSPITAL | Age: 66
LOS: 1 days | Discharge: HOME | End: 2021-06-17
Attending: EMERGENCY MEDICINE | Admitting: EMERGENCY MEDICINE
Payer: COMMERCIAL

## 2021-06-17 ENCOUNTER — NON-APPOINTMENT (OUTPATIENT)
Age: 66
End: 2021-06-17

## 2021-06-17 VITALS
DIASTOLIC BLOOD PRESSURE: 84 MMHG | RESPIRATION RATE: 16 BRPM | HEART RATE: 68 BPM | SYSTOLIC BLOOD PRESSURE: 137 MMHG | OXYGEN SATURATION: 99 % | TEMPERATURE: 98 F

## 2021-06-17 VITALS
HEIGHT: 61 IN | HEART RATE: 75 BPM | DIASTOLIC BLOOD PRESSURE: 87 MMHG | SYSTOLIC BLOOD PRESSURE: 146 MMHG | RESPIRATION RATE: 16 BRPM | TEMPERATURE: 99 F | OXYGEN SATURATION: 97 % | WEIGHT: 151.02 LBS

## 2021-06-17 DIAGNOSIS — C53.9 MALIGNANT NEOPLASM OF CERVIX UTERI, UNSPECIFIED: Chronic | ICD-10-CM

## 2021-06-17 LAB
ALBUMIN SERPL ELPH-MCNC: 3.9 G/DL — SIGNIFICANT CHANGE UP (ref 3.3–5)
ALP SERPL-CCNC: 100 U/L — SIGNIFICANT CHANGE UP (ref 40–120)
ALT FLD-CCNC: 24 U/L — SIGNIFICANT CHANGE UP (ref 12–78)
ANION GAP SERPL CALC-SCNC: 8 MMOL/L — SIGNIFICANT CHANGE UP (ref 5–17)
APPEARANCE UR: CLEAR — SIGNIFICANT CHANGE UP
AST SERPL-CCNC: 17 U/L — SIGNIFICANT CHANGE UP (ref 15–37)
BASOPHILS # BLD AUTO: 0.03 K/UL — SIGNIFICANT CHANGE UP (ref 0–0.2)
BASOPHILS NFR BLD AUTO: 0.3 % — SIGNIFICANT CHANGE UP (ref 0–2)
BILIRUB SERPL-MCNC: 0.5 MG/DL — SIGNIFICANT CHANGE UP (ref 0.2–1.2)
BILIRUB UR-MCNC: NEGATIVE — SIGNIFICANT CHANGE UP
BUN SERPL-MCNC: 11 MG/DL — SIGNIFICANT CHANGE UP (ref 7–23)
CALCIUM SERPL-MCNC: 9.1 MG/DL — SIGNIFICANT CHANGE UP (ref 8.5–10.1)
CHLORIDE SERPL-SCNC: 106 MMOL/L — SIGNIFICANT CHANGE UP (ref 96–108)
CO2 SERPL-SCNC: 27 MMOL/L — SIGNIFICANT CHANGE UP (ref 22–31)
COLOR SPEC: SIGNIFICANT CHANGE UP
CREAT SERPL-MCNC: 0.76 MG/DL — SIGNIFICANT CHANGE UP (ref 0.5–1.3)
DIFF PNL FLD: ABNORMAL
EOSINOPHIL # BLD AUTO: 0.1 K/UL — SIGNIFICANT CHANGE UP (ref 0–0.5)
EOSINOPHIL NFR BLD AUTO: 1.1 % — SIGNIFICANT CHANGE UP (ref 0–6)
GLUCOSE SERPL-MCNC: 108 MG/DL — HIGH (ref 70–99)
GLUCOSE UR QL: NEGATIVE — SIGNIFICANT CHANGE UP
HCT VFR BLD CALC: 43.1 % — SIGNIFICANT CHANGE UP (ref 34.5–45)
HGB BLD-MCNC: 14.3 G/DL — SIGNIFICANT CHANGE UP (ref 11.5–15.5)
IMM GRANULOCYTES NFR BLD AUTO: 0.3 % — SIGNIFICANT CHANGE UP (ref 0–1.5)
KETONES UR-MCNC: NEGATIVE — SIGNIFICANT CHANGE UP
LEUKOCYTE ESTERASE UR-ACNC: NEGATIVE — SIGNIFICANT CHANGE UP
LYMPHOCYTES # BLD AUTO: 1.57 K/UL — SIGNIFICANT CHANGE UP (ref 1–3.3)
LYMPHOCYTES # BLD AUTO: 17.3 % — SIGNIFICANT CHANGE UP (ref 13–44)
MCHC RBC-ENTMCNC: 29.7 PG — SIGNIFICANT CHANGE UP (ref 27–34)
MCHC RBC-ENTMCNC: 33.2 GM/DL — SIGNIFICANT CHANGE UP (ref 32–36)
MCV RBC AUTO: 89.4 FL — SIGNIFICANT CHANGE UP (ref 80–100)
MONOCYTES # BLD AUTO: 0.74 K/UL — SIGNIFICANT CHANGE UP (ref 0–0.9)
MONOCYTES NFR BLD AUTO: 8.1 % — SIGNIFICANT CHANGE UP (ref 2–14)
NEUTROPHILS # BLD AUTO: 6.62 K/UL — SIGNIFICANT CHANGE UP (ref 1.8–7.4)
NEUTROPHILS NFR BLD AUTO: 72.9 % — SIGNIFICANT CHANGE UP (ref 43–77)
NITRITE UR-MCNC: NEGATIVE — SIGNIFICANT CHANGE UP
NRBC # BLD: 0 /100 WBCS — SIGNIFICANT CHANGE UP (ref 0–0)
PH UR: 7 — SIGNIFICANT CHANGE UP (ref 5–8)
PLATELET # BLD AUTO: 278 K/UL — SIGNIFICANT CHANGE UP (ref 150–400)
POTASSIUM SERPL-MCNC: 3.7 MMOL/L — SIGNIFICANT CHANGE UP (ref 3.5–5.3)
POTASSIUM SERPL-SCNC: 3.7 MMOL/L — SIGNIFICANT CHANGE UP (ref 3.5–5.3)
PROT SERPL-MCNC: 7.9 G/DL — SIGNIFICANT CHANGE UP (ref 6–8.3)
PROT UR-MCNC: NEGATIVE — SIGNIFICANT CHANGE UP
RBC # BLD: 4.82 M/UL — SIGNIFICANT CHANGE UP (ref 3.8–5.2)
RBC # FLD: 12.6 % — SIGNIFICANT CHANGE UP (ref 10.3–14.5)
SODIUM SERPL-SCNC: 141 MMOL/L — SIGNIFICANT CHANGE UP (ref 135–145)
SP GR SPEC: 1 — LOW (ref 1.01–1.02)
UROBILINOGEN FLD QL: NEGATIVE — SIGNIFICANT CHANGE UP
WBC # BLD: 9.09 K/UL — SIGNIFICANT CHANGE UP (ref 3.8–10.5)
WBC # FLD AUTO: 9.09 K/UL — SIGNIFICANT CHANGE UP (ref 3.8–10.5)

## 2021-06-17 PROCEDURE — 85025 COMPLETE CBC W/AUTO DIFF WBC: CPT

## 2021-06-17 PROCEDURE — 96375 TX/PRO/DX INJ NEW DRUG ADDON: CPT

## 2021-06-17 PROCEDURE — 74177 CT ABD & PELVIS W/CONTRAST: CPT | Mod: 26,MA

## 2021-06-17 PROCEDURE — 99284 EMERGENCY DEPT VISIT MOD MDM: CPT

## 2021-06-17 PROCEDURE — 99284 EMERGENCY DEPT VISIT MOD MDM: CPT | Mod: 25

## 2021-06-17 PROCEDURE — 81001 URINALYSIS AUTO W/SCOPE: CPT

## 2021-06-17 PROCEDURE — 36415 COLL VENOUS BLD VENIPUNCTURE: CPT

## 2021-06-17 PROCEDURE — 80053 COMPREHEN METABOLIC PANEL: CPT

## 2021-06-17 PROCEDURE — 74177 CT ABD & PELVIS W/CONTRAST: CPT

## 2021-06-17 PROCEDURE — 96374 THER/PROPH/DIAG INJ IV PUSH: CPT | Mod: XU

## 2021-06-17 RX ORDER — PIPERACILLIN AND TAZOBACTAM 4; .5 G/20ML; G/20ML
3.38 INJECTION, POWDER, LYOPHILIZED, FOR SOLUTION INTRAVENOUS ONCE
Refills: 0 | Status: COMPLETED | OUTPATIENT
Start: 2021-06-17 | End: 2021-06-17

## 2021-06-17 RX ORDER — KETOROLAC TROMETHAMINE 30 MG/ML
15 SYRINGE (ML) INJECTION ONCE
Refills: 0 | Status: DISCONTINUED | OUTPATIENT
Start: 2021-06-17 | End: 2021-06-17

## 2021-06-17 RX ORDER — CIPROFLOXACIN LACTATE 400MG/40ML
1 VIAL (ML) INTRAVENOUS
Qty: 14 | Refills: 0
Start: 2021-06-17 | End: 2021-06-23

## 2021-06-17 RX ORDER — SODIUM CHLORIDE 9 MG/ML
1000 INJECTION INTRAMUSCULAR; INTRAVENOUS; SUBCUTANEOUS ONCE
Refills: 0 | Status: COMPLETED | OUTPATIENT
Start: 2021-06-17 | End: 2021-06-17

## 2021-06-17 RX ADMIN — PIPERACILLIN AND TAZOBACTAM 200 GRAM(S): 4; .5 INJECTION, POWDER, LYOPHILIZED, FOR SOLUTION INTRAVENOUS at 12:12

## 2021-06-17 RX ADMIN — Medication 15 MILLIGRAM(S): at 12:12

## 2021-06-17 RX ADMIN — SODIUM CHLORIDE 1000 MILLILITER(S): 9 INJECTION INTRAMUSCULAR; INTRAVENOUS; SUBCUTANEOUS at 12:12

## 2021-06-17 NOTE — ED PROVIDER NOTE - OBJECTIVE STATEMENT
Pt is a 67 yo female with pmhx NSTEMI IN 2012, CAD with stent, HTN, HLD, cervical cancer, diverticulitis co of LLQ pain since yesterday around 6 pm. Pt states feeling constipated and bloated for one week. Pt denies any nvd fever chills dysuria. Pt states feels like previous diverticulitis. Pt called GI and advised to come to ER.     CARLENE Good

## 2021-06-17 NOTE — ED PROVIDER NOTE - CLINICAL SUMMARY MEDICAL DECISION MAKING FREE TEXT BOX
Pt is a 67 yo female with LLQ pain will get labs ct scan r/o diverticulitis pt refused pain meds at this time

## 2021-06-17 NOTE — ED PROVIDER NOTE - CARE PROVIDER_API CALL
Lili Wheatley)  Gastroenterology; Internal Medicine  [address] [place] [address]  Phone: [phone]  Fax: [phone]  Follow Up Time:

## 2021-06-17 NOTE — ED PROVIDER NOTE - ATTENDING CONTRIBUTION TO CARE
66 year old female with 3rd episode of diverticulitis, normal wbc, afebrile, intolerance to metronidazole, so will use previous abx treatment recommended by ID. Precautions reviewed, pt improved after meds.

## 2021-06-17 NOTE — ED ADULT NURSE NOTE - NSIMPLEMENTINTERV_GEN_ALL_ED
Implemented All Universal Safety Interventions:  [place] to call system. Call bell, personal items and telephone within reach. Instruct patient to call for assistance. Room bathroom lighting operational. Non-slip footwear when patient is off stretcher. Physically safe environment: no spills, clutter or unnecessary equipment. Stretcher in lowest position, wheels locked, appropriate side rails in place.

## 2021-06-17 NOTE — ED PROVIDER NOTE - PMH
Allergy  PCN  CAD (coronary artery disease)    Cervical cancer  with leep procedure, denies chemo/ radiation  Diverticulitis    GERD (gastroesophageal reflux disease)    Hypercholesteremia    Hypertension    S/P coronary artery stent placement  2007 in L[place] and 2012 in [place]  Seasonal allergies    Torn ligament  right 3rd and 4th metatarsal

## 2021-06-17 NOTE — ED PROVIDER NOTE - NSFOLLOWUPINSTRUCTIONS_ED_ALL_ED_FT
Follow up with GI  return to er for any worsening symptoms fever or pain  complete the antibiotics    Diverticulitis    WHAT YOU NEED TO KNOW:    Diverticulitis is a condition that causes small pockets along your intestine called diverticula to become inflamed or infected. This is caused by hard bowel movements, food, or bacteria that get stuck in the pockets.    DISCHARGE INSTRUCTIONS:    Return to the emergency department if:   •You have bowel movement or foul-smelling discharge leaking from your vagina or in your urine.      •You have severe diarrhea.      •You urinate less than usual or not at all.      •You are not able to have a bowel movement.      •You cannot stop vomiting.       •You have severe abdominal pain, a fever, and your abdomen is larger than usual.       •You have new or increased blood in your bowel movements.       Contact your healthcare provider if:   •You have pain when you urinate.      •Your symptoms get worse or do not go away.       •You have questions or concerns about your condition or care.       Medicines:   •Antibiotics may be given to help treat a bacterial infection.      •Prescription pain medicine may be given. Ask your healthcare provider how to take this medicine safely. Some prescription pain medicines contain acetaminophen. Do not take other medicines that contain acetaminophen without talking to your healthcare provider. Too much acetaminophen may cause liver damage. Prescription pain medicine may cause constipation. Ask your healthcare provider how to prevent or treat constipation.       •Take your medicine as directed. Contact your healthcare provider if you think your medicine is not helping or if you have side effects. Tell him or her if you are allergic to any medicine. Keep a list of the medicines, vitamins, and herbs you take. Include the amounts, and when and why you take them. Bring the list or the pill bottles to follow-up visits. Carry your medicine list with you in case of an emergency.      Clear liquid diet: A clear liquid diet includes any liquids that you can see through. Examples include water, ginger-irina, cranberry or apple juice, frozen fruit ice, or broth. Stay on a clear liquid diet until your symptoms are gone, or as directed.     Follow up with your healthcare provider as directed: You may need to return for a colonoscopy. When your symptoms are gone, you may need a low-fat, high-fiber diet to prevent diverticulitis from developing again. Your healthcare provider or dietitian can help you create meal plans. Write down your questions so you remember to ask them during your visits.

## 2021-06-17 NOTE — ED PROVIDER NOTE - PROGRESS NOTE DETAILS
ct scan dictic will give cipro and clinda since flagyl intolerance pain controlled looks well clinically advised gi f/u and return precautions given

## 2021-06-17 NOTE — ED PROVIDER NOTE - PATIENT PORTAL LINK FT
You can access the FollowMyHealth Patient Portal offered by [facility] by registering at the following website: [email or web address]. By joining [employer]’s FollowMyHealth portal, you will also be able to view your health information using other applications (apps) compatible with our system.

## 2021-06-17 NOTE — ED ADULT NURSE NOTE - PRIMARY CARE PROVIDER
HISTORY OF PRESENT ILLNESS  Lakia Marin is a 59 y.o. female. HPI  In for CPE  DM, stable, last a1c was 6.6, glucose 109, she did not get her diabetic eye exam this year yet, she stated she will see her Optometrist soon  HLD, stable, last LDl was 71  HTN, stable on meds daily  Vit d def, controlled on vit d weekly  Review of Systems   Constitutional: Negative for chills, fever and weight loss. Eyes: Negative for blurred vision. Respiratory: Negative for cough, shortness of breath and wheezing. Cardiovascular: Negative for chest pain, palpitations, orthopnea, leg swelling and PND. Gastrointestinal: Negative for abdominal pain, diarrhea, heartburn and nausea. Skin: Negative for rash. Neurological: Negative for dizziness, tingling, weakness and headaches. Psychiatric/Behavioral: Negative for depression. The patient does not have insomnia. All other systems reviewed and are negative. Physical Exam   Constitutional: She is oriented to person, place, and time. She appears well-developed. HENT:   Head: Normocephalic and atraumatic. Right Ear: Tympanic membrane and external ear normal.   Left Ear: Tympanic membrane and external ear normal.   Mouth/Throat: Oropharynx is clear and moist. No oropharyngeal exudate. Eyes: Conjunctivae and EOM are normal. Pupils are equal, round, and reactive to light. Neck: Normal range of motion. Neck supple. No JVD present. No thyromegaly present. Cardiovascular: Normal rate, regular rhythm, normal heart sounds and intact distal pulses. No murmur heard. Pulmonary/Chest: Effort normal and breath sounds normal. She has no wheezes. She has no rales. Abdominal: Soft. Bowel sounds are normal. She exhibits no mass. There is no hepatosplenomegaly. There is no tenderness. Musculoskeletal: Normal range of motion. She exhibits no edema. Lymphadenopathy:     She has no cervical adenopathy. Neurological: She is alert and oriented to person, place, and time. Skin: Skin is warm and dry. No rash noted. Psychiatric: She has a normal mood and affect. Her behavior is normal. Judgment normal.   Vitals reviewed. ASSESSMENT and PLAN  Diagnoses and all orders for this visit:    1. Routine general medical examination at a health care facility  -     CBC WITH AUTOMATED DIFF; Future  -     LIPID PANEL; Future  -     METABOLIC PANEL, COMPREHENSIVE; Future  -     HEMOGLOBIN A1C WITH EAG; Future  -     AMB POC EKG ROUTINE W/ 12 LEADS, INTER & REP    2. Diabetes mellitus without complication ([address]  -     URINALYSIS W/MICROSCOPIC; Future  -     metFORMIN ER (GLUCOPHAGE XR) 500 mg tablet; Take 1 Tab by mouth two (2) times daily (with meals). 3. Vitamin D deficiency  -     VITAMIN D, 25 HYDROXY; Future  -     ergocalciferol (VITAMIN D2) 50,000 unit capsule; Take 1 Cap by mouth every seven (7) days. 4. HTN (hypertension), benign  -     CBC WITH AUTOMATED DIFF; Future  -     LIPID PANEL; Future  -     METABOLIC PANEL, COMPREHENSIVE; Future  -     URINALYSIS W/MICROSCOPIC; Future  -     AMB POC EKG ROUTINE W/ 12 LEADS, INTER & REP  -     amLODIPine (NORVASC) 5 mg tablet; Take 1 Tab by mouth daily. -     valsartan (DIOVAN) 160 mg tablet; Take 1 Tab by mouth daily. 5. Pure hypercholesterolemia  -     CBC WITH AUTOMATED DIFF; Future  -     LIPID PANEL; Future  -     METABOLIC PANEL, COMPREHENSIVE; Future  -     atorvastatin (LIPITOR) 10 mg tablet; Take 1 Tab by mouth daily. 6. Encounter for immunization  -     Yannick Levine,, Tetanus (BOOSTRIX TDAP) 2.5-8-5 Lf-mcg-Lf/0.5mL susp susp; 0.5 mL by IntraMUSCular route once for 1 dose.     7. Colon cancer screening  -     REFERRAL TO GASTROENTEROLOGY    Lab Results   Component Value Date/Time    Sodium 140 01/05/2017 08:35 AM    Potassium 5.1 01/05/2017 08:35 AM    Chloride 98 01/05/2017 08:35 AM    CO2 26 01/05/2017 08:35 AM    Glucose 109 01/05/2017 08:35 AM    BUN 17 01/05/2017 08:35 AM    Creatinine 0.94 01/05/2017 08:35 AM    BUN/Creatinine ratio 18 01/05/2017 08:35 AM    GFR est AA 75 01/05/2017 08:35 AM    GFR est non-AA 65 01/05/2017 08:35 AM    Calcium 9.9 01/05/2017 08:35 AM    Bilirubin, total 0.3 01/05/2017 08:35 AM    AST (SGOT) 21 01/05/2017 08:35 AM    Alk.  phosphatase 77 01/05/2017 08:35 AM    Protein, total 7.0 01/05/2017 08:35 AM    Albumin 4.2 01/05/2017 08:35 AM    A-G Ratio 1.5 01/05/2017 08:35 AM    ALT (SGPT) 21 01/05/2017 08:35 AM     Lab Results   Component Value Date/Time    Cholesterol, total 130 01/05/2017 08:35 AM    HDL Cholesterol 39 01/05/2017 08:35 AM    LDL, calculated 71 01/05/2017 08:35 AM    VLDL, calculated 20 01/05/2017 08:35 AM    Triglyceride 99 01/05/2017 08:35 AM     Lab Results   Component Value Date/Time    Hemoglobin A1c 6.6 01/05/2017 08:35 AM   rtc 4 mos unk

## 2021-06-18 NOTE — ED POST DISCHARGE NOTE - RESULT SUMMARY
pt states pain is better no fever tolerating food and started abx pt advised to return to er for any worsening symptom

## 2021-06-23 ENCOUNTER — APPOINTMENT (OUTPATIENT)
Dept: GASTROENTEROLOGY | Facility: CLINIC | Age: 66
End: 2021-06-23
Payer: COMMERCIAL

## 2021-06-23 VITALS
SYSTOLIC BLOOD PRESSURE: 130 MMHG | HEART RATE: 64 BPM | HEIGHT: 62 IN | WEIGHT: 151 LBS | RESPIRATION RATE: 14 BRPM | DIASTOLIC BLOOD PRESSURE: 80 MMHG | OXYGEN SATURATION: 98 % | BODY MASS INDEX: 27.79 KG/M2

## 2021-06-23 DIAGNOSIS — Z80.0 FAMILY HISTORY OF MALIGNANT NEOPLASM OF DIGESTIVE ORGANS: ICD-10-CM

## 2021-06-23 PROCEDURE — 99072 ADDL SUPL MATRL&STAF TM PHE: CPT

## 2021-06-23 PROCEDURE — 99214 OFFICE O/P EST MOD 30 MIN: CPT

## 2021-06-23 NOTE — ASSESSMENT
[FreeTextEntry1] : IMPRESSION: \par #  Sigmoid diverticulitis - currently on antibiotics and doing well.\par -  CT scan of the abd/pelvis 6/17/2021: Sigmoid diverticulitis - b/l ovarian cysts slightly more lager than prior study - consider MRI of pelvis as per radiologist. She has follow up GYN\par -  History of abdominal bloating/distention/difficulty moving her bowels  \par -  History of diverticulitis in Feb 2018 - treated with oral abx\par -  Colonoscopy in in 4/2018 - two polyps were removed - largest polyp was 1 cm - hyperplastic polyps. Repeat colonoscopy in 4/2021 recommended. \par \par #  History of acid reflux - on H2 blockers since past 7 years \par -   EGD in 2014 - mild gastritis and mild duodenitis. \par \par #  Family history of digestive cancers\par -  Father had cancerous colon polyp in late 70s.  Paternal aunt had colon cancer late 60s.  \par -  Paternal aunt had esophageal cancer \par -  Advise previously to consider genetic testing \par \par #  CAD s/p PCI to the proximal LAD with a Taxus stent in 2007, PCI to the RI in March of 2012 in the setting of a mild NSTEMI, on aspirin/Plavix, hyperlipidemia, and hypertension\par \par \par \par DISCUSSION/PLAN\par Advised her to finish course of antibiotics for diverticulitis.  \par \par We had a long discussion on diverticulitis - reviewed risk factors for diverticulitis, complications of diverticulitis and surgical indications for diverticulitis.  She would need a colonoscopy to r/o colon neoplasm under monitored anesthesia care.  Risks such as perforation  (4 in 10,000) requiring surgery, bleeding (8 in 10,000), infection, diverticulitis, colitis, missed colon cancer (2% to 6%), internal organ injury, etc, risks of bowel prep including colitis, syncope, adverse reaction to medication etc. and risks of anesthesia including cardiopulmonary compromise were discussed with patient.  She would need to a arrange a colonoscopy 6 weeks after treatment of diverticulitis.  She was advised to see me in office two weeks prior to procedure.  She was advised to hold Plavix 5 days prior and aspirin is OK to take.  \par \par Since this was her second attack of diverticulitis - and she has a history of coronary artery disease, NSTEMI we discussed best to consider surgical resection since recurrent attack in setting of comorbidities may place her a higher risk for worsening disease, complex diverticulitis etc.   \par \par Previously, we advised her to arrange for an upper endoscopy for reflux symptoms to r/o ulcer disease etc - risks and benefits reviewed- she will arrange at time of colonoscopy.

## 2021-06-23 NOTE — PHYSICAL EXAM
[General Appearance - Alert] : alert [General Appearance - In No Acute Distress] : in no acute distress [Sclera] : the sclera and conjunctiva were normal [Extraocular Movements] : extraocular movements were intact [Outer Ear] : the ears and nose were normal in appearance [Hearing Threshold Finger Rub Not [place]] : hearing was normal [Neck Appearance] : the appearance of the neck was normal [Neck Cervical Mass (___cm)] : no neck mass was observed [Auscultation Breath Sounds / Voice Sounds] : lungs were clear to auscultation bilaterally [Heart Rate And Rhythm] : heart rate was normal and rhythm regular [Heart Sounds] : normal S1 and S2 [Murmurs] : no murmurs [Heart Sounds Gallop] : no gallops [Heart Sounds Pericardial Friction Rub] : no pericardial rub [Bowel Sounds] : normal bowel sounds [Abdomen Soft] : soft [Abdomen Tenderness] : non-tender [Abdomen Mass (___ Cm)] : no abdominal mass palpated [Cervical Lymph Nodes Enlarged Posterior Bilaterally] : posterior cervical [Cervical Lymph Nodes Enlarged Anterior Bilaterally] : anterior cervical [Supraclavicular Lymph Nodes Enlarged Bilaterally] : supraclavicular [Abnormal Walk] : normal gait [Nail Clubbing] : no clubbing  or cyanosis of the fingernails [Skin Color & Pigmentation] : normal skin color and pigmentation [] : no rash [Oriented To Time, Place, And Person] : oriented to person, place, and time [Impaired Insight] : insight and judgment were intact [Affect] : the affect was normal

## 2021-06-23 NOTE — HISTORY OF PRESENT ILLNESS
[de-identified] : 67 y/o mother of three, with Hx of CAD s/p PCI to the proximal LAD with a Taxus stent in 2007, PCI to the RI in March of 2012 in the setting of a mild NSTEMI, on aspirin/Plavix, hyperlipidemia, hypertension, diverticulitis who presents for follow up after recently being found to have diverticulitis. \par \par She says she went to ER last Thursday for lower abdominal pain- she was discharged from ER with Cipro/Cleocin for sigmoid diverticulitis.  She says pain resolved by Saturday.  No fevers, no chills.  No loss of appetite, no weight loss. No melena and no hematochezia. \par \par \par Initial visit 4/2021: \par She says she had only one attack of diverticulitis. She recalls on Feb, 2018, while at work she started having lower abdominal pain that progressively worsened, she went to ER and diagnosed with diverticulitis - she was discharged from ER to complete Cipro and Flagyl. She says she had a "terrible" reaction to Flagyl - she felt weak, faint, couldn't walk. She went back to ER because she was feeling weak - was eventually told that perhaps she is allergic to Flagyl. \par \par Since then gets "twinges" in lower abdomen every now and then. \par \par She had a colonoscopy in 4/2018, and was found to have a 1 cm polyp in the rectum that was removed, 8 mm descending colon polyp removed. There was sigmoid diverticulosis. Both polyps were hyperplastic. She was advised to have a repeat colonoscopy in 3 years. \par \par She says she was tested for celiac disease in the past and it was negative. \par Last couple of months, she has been feeling bloated. \par \par Abdomen distends and can get hard - she takes chamomile tea which helps with bloating, with the distension and hardness. \par \par Gets nausea when feeling bloated but does not vomit. \par  \par She takes beano and prebiotic and probiotics - she is not sure if it has been helping with her bloating. \par \par Recently she has been having difficulty moving her bowels, abdominal cramps - she took a laxative and then felt faint. \par \par She has a bowel movement once a day - when she has vegetables shakes she has better bowel movements. \par \par No melena and no hematochezia. \par No loss of appetite, no abnormal weight loss. \par \par Takes Pepcid twice a day for years - originally on Zantac - she was started on acid reduces 7 years because at that time she experiencing heartburn. She has not taken herself off of H2 blockers. She says rarely gets heartburn. \par \par 2 months ago she saw her cardiologist and underwent stress test and echo. \par \par On Oct 2014 she had an upper endoscopy and found to have mild gastritis, and mild duodenitis. \par \par Sister has had recurrent diverticulitis. Father did not have colon cancer - only prostate cancer. Paternal aunt had esophageal cancer. \par \par \par Discussion/Summary April 2021: \par Spoke with patient advised about holding Plavix 5 days prior - advised clearance from cardiologist. \par \par She says she spoke with her sister who told her father had cancerous colon polyp in his late 70s. Paternal aunt also had colon cancer in her late 60s. Another paternal aunt had esophageal cancer.\par \par Advised considering genetic testing and seeing counselor - contact information given. \par \par \par \par Discussion/Summary June 17, 2021: \par lower abdominal pain =- worried about diverticulitis - took abx - advised to go to ER and cancel upcoming colonoscopy. \par \par \par CT scan of the abd/pelvis 6/17/2021:  Sigmoid diverticulitis.  B/l ovarian cysts slightly more lager than prior study - consider MRI of pelvis as per radiologist.

## 2021-06-24 ENCOUNTER — APPOINTMENT (OUTPATIENT)
Dept: GASTROENTEROLOGY | Facility: HOSPITAL | Age: 66
End: 2021-06-24

## 2021-07-06 ENCOUNTER — RESULT REVIEW (OUTPATIENT)
Age: 66
End: 2021-07-06

## 2021-07-15 LAB
25(OH)D3 SERPL-MCNC: 50.6 NG/ML
ALBUMIN SERPL ELPH-MCNC: 4.5 G/DL
ALP BLD-CCNC: 90 U/L
ALT SERPL-CCNC: 19 U/L
ANION GAP SERPL CALC-SCNC: 12 MMOL/L
APPEARANCE: CLEAR
AST SERPL-CCNC: 25 U/L
BASOPHILS # BLD AUTO: 0.04 K/UL
BASOPHILS NFR BLD AUTO: 0.8 %
BILIRUB SERPL-MCNC: 0.3 MG/DL
BILIRUBIN URINE: NEGATIVE
BLOOD URINE: NEGATIVE
BUN SERPL-MCNC: 15 MG/DL
CALCIUM SERPL-MCNC: 9.5 MG/DL
CHLORIDE SERPL-SCNC: 104 MMOL/L
CHOLEST SERPL-MCNC: 144 MG/DL
CO2 SERPL-SCNC: 25 MMOL/L
COLOR: NORMAL
CREAT SERPL-MCNC: 0.68 MG/DL
CREAT SPEC-SCNC: 103 MG/DL
EOSINOPHIL # BLD AUTO: 0.17 K/UL
EOSINOPHIL NFR BLD AUTO: 3.5 %
ESTIMATED AVERAGE GLUCOSE: 131 MG/DL
GLUCOSE QUALITATIVE U: NEGATIVE
GLUCOSE SERPL-MCNC: 102 MG/DL
HBA1C MFR BLD HPLC: 6.2 %
HCT VFR BLD CALC: 42.8 %
HDLC SERPL-MCNC: 67 MG/DL
HGB BLD-MCNC: 14.2 G/DL
IMM GRANULOCYTES NFR BLD AUTO: 0.2 %
KETONES URINE: NEGATIVE
LDLC SERPL CALC-MCNC: 67 MG/DL
LEUKOCYTE ESTERASE URINE: NEGATIVE
LYMPHOCYTES # BLD AUTO: 1.69 K/UL
LYMPHOCYTES NFR BLD AUTO: 34.6 %
MAN DIFF?: NORMAL
MCHC RBC-ENTMCNC: 29.7 PG
MCHC RBC-ENTMCNC: 33.2 GM/DL
MCV RBC AUTO: 89.5 FL
MICROALBUMIN 24H UR DL<=1MG/L-MCNC: <1.2 MG/DL
MICROALBUMIN/CREAT 24H UR-RTO: NORMAL MG/G
MONOCYTES # BLD AUTO: 0.44 K/UL
MONOCYTES NFR BLD AUTO: 9 %
NEUTROPHILS # BLD AUTO: 2.53 K/UL
NEUTROPHILS NFR BLD AUTO: 51.9 %
NITRITE URINE: NEGATIVE
NONHDLC SERPL-MCNC: 76 MG/DL
PH URINE: 7
PLATELET # BLD AUTO: 281 K/UL
POTASSIUM SERPL-SCNC: 5.1 MMOL/L
PROT SERPL-MCNC: 6.9 G/DL
PROTEIN URINE: NORMAL
RBC # BLD: 4.78 M/UL
RBC # FLD: 12.8 %
SODIUM SERPL-SCNC: 141 MMOL/L
SPECIFIC GRAVITY URINE: 1.02
TRIGL SERPL-MCNC: 45 MG/DL
TSH SERPL-ACNC: 1.23 UIU/ML
UROBILINOGEN URINE: NORMAL
WBC # FLD AUTO: 4.88 K/UL

## 2021-07-19 ENCOUNTER — APPOINTMENT (OUTPATIENT)
Dept: INTERNAL MEDICINE | Facility: CLINIC | Age: 66
End: 2021-07-19
Payer: COMMERCIAL

## 2021-07-19 VITALS
HEIGHT: 68 IN | WEIGHT: 151 LBS | DIASTOLIC BLOOD PRESSURE: 92 MMHG | BODY MASS INDEX: 22.88 KG/M2 | TEMPERATURE: 98 F | RESPIRATION RATE: 14 BRPM | SYSTOLIC BLOOD PRESSURE: 173 MMHG | OXYGEN SATURATION: 99 % | HEART RATE: 68 BPM

## 2021-07-19 DIAGNOSIS — Z78.0 ASYMPTOMATIC MENOPAUSAL STATE: ICD-10-CM

## 2021-07-19 DIAGNOSIS — G57.62 LESION OF PLANTAR NERVE, LEFT LOWER LIMB: ICD-10-CM

## 2021-07-19 PROCEDURE — 99072 ADDL SUPL MATRL&STAF TM PHE: CPT

## 2021-07-19 PROCEDURE — 36415 COLL VENOUS BLD VENIPUNCTURE: CPT

## 2021-07-19 PROCEDURE — G0009: CPT

## 2021-07-19 PROCEDURE — 90732 PPSV23 VACC 2 YRS+ SUBQ/IM: CPT

## 2021-07-19 PROCEDURE — 99397 PER PM REEVAL EST PAT 65+ YR: CPT | Mod: 25

## 2021-07-19 PROCEDURE — 99497 ADVNCD CARE PLAN 30 MIN: CPT | Mod: 25

## 2021-07-20 ENCOUNTER — TRANSCRIPTION ENCOUNTER (OUTPATIENT)
Age: 66
End: 2021-07-20

## 2021-08-02 ENCOUNTER — RESULT REVIEW (OUTPATIENT)
Age: 66
End: 2021-08-02

## 2021-08-02 ENCOUNTER — NON-APPOINTMENT (OUTPATIENT)
Age: 66
End: 2021-08-02

## 2021-08-03 ENCOUNTER — NON-APPOINTMENT (OUTPATIENT)
Age: 66
End: 2021-08-03

## 2021-08-05 ENCOUNTER — APPOINTMENT (OUTPATIENT)
Dept: CT IMAGING | Facility: CLINIC | Age: 66
End: 2021-08-05
Payer: COMMERCIAL

## 2021-08-05 ENCOUNTER — OUTPATIENT (OUTPATIENT)
Dept: OUTPATIENT SERVICES | Facility: HOSPITAL | Age: 66
LOS: 1 days | End: 2021-08-05
Payer: COMMERCIAL

## 2021-08-05 ENCOUNTER — OUTPATIENT (OUTPATIENT)
Dept: OUTPATIENT SERVICES | Facility: HOSPITAL | Age: 66
LOS: 1 days | End: 2021-08-05

## 2021-08-05 DIAGNOSIS — Z00.8 ENCOUNTER FOR OTHER GENERAL EXAMINATION: ICD-10-CM

## 2021-08-05 DIAGNOSIS — C53.9 MALIGNANT NEOPLASM OF CERVIX UTERI, UNSPECIFIED: Chronic | ICD-10-CM

## 2021-08-05 DIAGNOSIS — K57.32 DIVERTICULITIS OF LARGE INTESTINE WITHOUT PERFORATION OR ABSCESS WITHOUT BLEEDING: ICD-10-CM

## 2021-08-05 PROCEDURE — 74177 CT ABD & PELVIS W/CONTRAST: CPT | Mod: 26

## 2021-08-05 PROCEDURE — 74177 CT ABD & PELVIS W/CONTRAST: CPT

## 2021-08-06 ENCOUNTER — NON-APPOINTMENT (OUTPATIENT)
Age: 66
End: 2021-08-06

## 2021-08-16 ENCOUNTER — APPOINTMENT (OUTPATIENT)
Dept: DISASTER EMERGENCY | Facility: CLINIC | Age: 66
End: 2021-08-16

## 2021-08-17 ENCOUNTER — TRANSCRIPTION ENCOUNTER (OUTPATIENT)
Age: 66
End: 2021-08-17

## 2021-08-17 LAB — SARS-COV-2 N GENE NPH QL NAA+PROBE: NOT DETECTED

## 2021-08-19 ENCOUNTER — APPOINTMENT (OUTPATIENT)
Dept: GASTROENTEROLOGY | Facility: HOSPITAL | Age: 66
End: 2021-08-19

## 2021-09-08 ENCOUNTER — NON-APPOINTMENT (OUTPATIENT)
Age: 66
End: 2021-09-08

## 2021-09-08 ENCOUNTER — APPOINTMENT (OUTPATIENT)
Dept: COLORECTAL SURGERY | Facility: CLINIC | Age: 66
End: 2021-09-08
Payer: COMMERCIAL

## 2021-09-08 VITALS
HEIGHT: 61.5 IN | WEIGHT: 148 LBS | RESPIRATION RATE: 12 BRPM | TEMPERATURE: 36.5 F | DIASTOLIC BLOOD PRESSURE: 80 MMHG | HEART RATE: 66 BPM | SYSTOLIC BLOOD PRESSURE: 150 MMHG | BODY MASS INDEX: 27.58 KG/M2

## 2021-09-08 PROCEDURE — 99203 OFFICE O/P NEW LOW 30 MIN: CPT

## 2021-09-09 ENCOUNTER — APPOINTMENT (OUTPATIENT)
Dept: INTERNAL MEDICINE | Facility: CLINIC | Age: 66
End: 2021-09-09
Payer: COMMERCIAL

## 2021-09-09 ENCOUNTER — APPOINTMENT (OUTPATIENT)
Dept: INTERNAL MEDICINE | Facility: CLINIC | Age: 66
End: 2021-09-09

## 2021-09-09 VITALS
SYSTOLIC BLOOD PRESSURE: 138 MMHG | BODY MASS INDEX: 27.77 KG/M2 | HEIGHT: 61.5 IN | TEMPERATURE: 97.7 F | OXYGEN SATURATION: 98 % | DIASTOLIC BLOOD PRESSURE: 81 MMHG | WEIGHT: 149 LBS | RESPIRATION RATE: 14 BRPM | HEART RATE: 76 BPM

## 2021-09-09 DIAGNOSIS — R03.0 ELEVATED BLOOD-PRESSURE READING, W/OUT DIAGNOSIS OF HYPERTENSION: ICD-10-CM

## 2021-09-09 DIAGNOSIS — K21.9 GASTRO-ESOPHAGEAL REFLUX DISEASE W/OUT ESOPHAGITIS: ICD-10-CM

## 2021-09-09 PROCEDURE — 99214 OFFICE O/P EST MOD 30 MIN: CPT

## 2021-09-10 NOTE — PROCEDURE
[FreeTextEntry1] : 66-year-old white female who presents today for consultation regarding management of diverticulitis.\par \par 3 years ago patient experienced lower abdominal pain and she had CAT scan proven diverticulitis. She developed significant side effects from the medications and was brought to the hospital for evaluation. She had a colonoscopy performed and a polypectomy was done. In August of 2021 patient developed lower abdominal pain and she was treated empirically with antibiotics. A CAT scan 7 days into the treatment revealed no evidence of diverticulitis radiographically. She was scheduled for a repeat colonoscopy and this was canceled once due to her recurrent abdominal pain and a second time due to issues with holding her antiplatelet medication.\par \par Patient has had one or maybe 2 bouts of non-complicated diverticulitis. I do not believe this merits surgical intervention. The patient also has a family history of esophageal cancer and was planning to have an upper endoscopy and colonoscopy simultaneously. I told her that I do not do upper endoscopies and I would refer her back to gastroenterology for her follow-up colonoscopy and for the convenience of having her upper endoscopy done during the same anesthetic.\par \par Questions were answered and she will return to GI.

## 2021-09-10 NOTE — HISTORY OF PRESENT ILLNESS
[FreeTextEntry1] : 67yo WF with initial bout of CT proven diverticulitis @3yrs ago. Treated with Cipro/Flagyl (due to Flagyl rxn pts dose cut in half). Ultimately taken to [facility] via ambulance secondary to drug rx.. Discharged on "different" ABX.\par \par Aug 2021 experienced bout of suprapubic pain. Pt initiated ABX. @7days later CT scan performed (no evidence of diverticulitis).\par \par Patients last colonoscopy 2018 (polypectomy). Recently was to be scheduled for f/u colonoscopy.\par \par Presents for consultation.\par \par Presently without pain, +fiber in diet, Miralax qod.\par \par

## 2021-09-10 NOTE — PHYSICAL EXAM
[Normal Breath Sounds] : Normal breath sounds [Normal Heart Sounds] : normal heart sounds [Normal Rate and Rhythm] : normal rate and rhythm [No Edema] : No edema [Alert] : alert [Oriented to Person] : oriented to person [Oriented to Place] : oriented to place [Oriented to Time] : oriented to time [Calm] : calm [de-identified] : round soft +BS NT/ND low midline scar [de-identified] : NC/AT [de-identified] : +ROM [de-identified] : intact

## 2021-09-13 ENCOUNTER — TRANSCRIPTION ENCOUNTER (OUTPATIENT)
Age: 66
End: 2021-09-13

## 2021-09-27 ENCOUNTER — APPOINTMENT (OUTPATIENT)
Dept: ULTRASOUND IMAGING | Facility: CLINIC | Age: 66
End: 2021-09-27
Payer: COMMERCIAL

## 2021-09-27 ENCOUNTER — APPOINTMENT (OUTPATIENT)
Dept: MAMMOGRAPHY | Facility: CLINIC | Age: 66
End: 2021-09-27
Payer: COMMERCIAL

## 2021-09-27 ENCOUNTER — OUTPATIENT (OUTPATIENT)
Dept: OUTPATIENT SERVICES | Facility: HOSPITAL | Age: 66
LOS: 1 days | End: 2021-09-27
Payer: COMMERCIAL

## 2021-09-27 DIAGNOSIS — Z00.8 ENCOUNTER FOR OTHER GENERAL EXAMINATION: ICD-10-CM

## 2021-09-27 DIAGNOSIS — C53.9 MALIGNANT NEOPLASM OF CERVIX UTERI, UNSPECIFIED: Chronic | ICD-10-CM

## 2021-09-27 PROCEDURE — 76641 ULTRASOUND BREAST COMPLETE: CPT | Mod: 26,50

## 2021-09-27 PROCEDURE — 77063 BREAST TOMOSYNTHESIS BI: CPT | Mod: 26

## 2021-09-27 PROCEDURE — 77063 BREAST TOMOSYNTHESIS BI: CPT

## 2021-09-27 PROCEDURE — 77067 SCR MAMMO BI INCL CAD: CPT | Mod: 26

## 2021-09-27 PROCEDURE — 76641 ULTRASOUND BREAST COMPLETE: CPT

## 2021-09-27 PROCEDURE — 77067 SCR MAMMO BI INCL CAD: CPT

## 2021-10-11 ENCOUNTER — RX RENEWAL (OUTPATIENT)
Age: 66
End: 2021-10-11

## 2021-10-12 ENCOUNTER — RX RENEWAL (OUTPATIENT)
Age: 66
End: 2021-10-12

## 2021-11-06 ENCOUNTER — TRANSCRIPTION ENCOUNTER (OUTPATIENT)
Age: 66
End: 2021-11-06

## 2021-11-29 ENCOUNTER — TRANSCRIPTION ENCOUNTER (OUTPATIENT)
Age: 66
End: 2021-11-29

## 2021-11-29 LAB — SARS-COV-2 N GENE NPH QL NAA+PROBE: NOT DETECTED

## 2021-11-30 ENCOUNTER — APPOINTMENT (OUTPATIENT)
Dept: GASTROENTEROLOGY | Facility: HOSPITAL | Age: 66
End: 2021-11-30

## 2021-11-30 ENCOUNTER — RESULT REVIEW (OUTPATIENT)
Age: 66
End: 2021-11-30

## 2021-11-30 ENCOUNTER — OUTPATIENT (OUTPATIENT)
Dept: OUTPATIENT SERVICES | Facility: HOSPITAL | Age: 66
LOS: 1 days | End: 2021-11-30
Payer: COMMERCIAL

## 2021-11-30 DIAGNOSIS — C53.9 MALIGNANT NEOPLASM OF CERVIX UTERI, UNSPECIFIED: Chronic | ICD-10-CM

## 2021-11-30 DIAGNOSIS — K63.5 POLYP OF COLON: ICD-10-CM

## 2021-11-30 DIAGNOSIS — Z87.19 PERSONAL HISTORY OF OTHER DISEASES OF THE DIGESTIVE SYSTEM: ICD-10-CM

## 2021-11-30 DIAGNOSIS — K21.9 GASTRO-ESOPHAGEAL REFLUX DISEASE WITHOUT ESOPHAGITIS: ICD-10-CM

## 2021-11-30 PROCEDURE — 43239 EGD BIOPSY SINGLE/MULTIPLE: CPT

## 2021-11-30 PROCEDURE — 88305 TISSUE EXAM BY PATHOLOGIST: CPT | Mod: 26

## 2021-11-30 PROCEDURE — 88312 SPECIAL STAINS GROUP 1: CPT

## 2021-11-30 PROCEDURE — 45385 COLONOSCOPY W/LESION REMOVAL: CPT | Mod: PT

## 2021-11-30 PROCEDURE — 88312 SPECIAL STAINS GROUP 1: CPT | Mod: 26

## 2021-11-30 PROCEDURE — 88305 TISSUE EXAM BY PATHOLOGIST: CPT

## 2021-11-30 PROCEDURE — 88313 SPECIAL STAINS GROUP 2: CPT | Mod: 26

## 2021-11-30 PROCEDURE — 88313 SPECIAL STAINS GROUP 2: CPT

## 2021-11-30 PROCEDURE — 45385 COLONOSCOPY W/LESION REMOVAL: CPT

## 2021-12-07 ENCOUNTER — NON-APPOINTMENT (OUTPATIENT)
Age: 66
End: 2021-12-07

## 2022-02-07 ENCOUNTER — RX RENEWAL (OUTPATIENT)
Age: 67
End: 2022-02-07

## 2022-02-28 NOTE — H&P PST ADULT - LAB RESULTS AND INTERPRETATION
Impression: Other secondary cataract, right eye: H26.491. Visually significant. Plan: Discussed condition and indications for treatment.  r/b/a of YAG capsulotomy discussed. Pt would like to proceed. Schedule YAG Cap OD. CBC, BMP in chart

## 2022-03-15 ENCOUNTER — RX RENEWAL (OUTPATIENT)
Age: 67
End: 2022-03-15

## 2022-03-18 ENCOUNTER — TRANSCRIPTION ENCOUNTER (OUTPATIENT)
Age: 67
End: 2022-03-18

## 2022-04-15 ENCOUNTER — APPOINTMENT (OUTPATIENT)
Dept: INTERNAL MEDICINE | Facility: CLINIC | Age: 67
End: 2022-04-15
Payer: COMMERCIAL

## 2022-04-15 VITALS
SYSTOLIC BLOOD PRESSURE: 184 MMHG | BODY MASS INDEX: 28.89 KG/M2 | HEIGHT: 61.5 IN | HEART RATE: 64 BPM | WEIGHT: 155 LBS | OXYGEN SATURATION: 98 % | RESPIRATION RATE: 14 BRPM | TEMPERATURE: 98 F | DIASTOLIC BLOOD PRESSURE: 92 MMHG

## 2022-04-15 VITALS — SYSTOLIC BLOOD PRESSURE: 138 MMHG | DIASTOLIC BLOOD PRESSURE: 70 MMHG

## 2022-04-15 DIAGNOSIS — K57.32 DIVERTICULITIS OF LARGE INTESTINE W/OUT PERFORATION OR ABSCESS W/OUT BLEEDING: ICD-10-CM

## 2022-04-15 PROCEDURE — 99214 OFFICE O/P EST MOD 30 MIN: CPT

## 2022-04-15 RX ORDER — SODIUM SULFATE, POTASSIUM SULFATE, MAGNESIUM SULFATE 17.5; 3.13; 1.6 G/ML; G/ML; G/ML
17.5-3.13-1.6 SOLUTION, CONCENTRATE ORAL
Qty: 1 | Refills: 0 | Status: DISCONTINUED | COMMUNITY
Start: 2021-04-05 | End: 2022-04-15

## 2022-04-17 ENCOUNTER — TRANSCRIPTION ENCOUNTER (OUTPATIENT)
Age: 67
End: 2022-04-17

## 2022-04-17 LAB
25(OH)D3 SERPL-MCNC: 46.3 NG/ML
ALBUMIN SERPL ELPH-MCNC: 4.5 G/DL
ALP BLD-CCNC: 92 U/L
ALT SERPL-CCNC: 17 U/L
ANION GAP SERPL CALC-SCNC: 15 MMOL/L
APPEARANCE: CLEAR
AST SERPL-CCNC: 19 U/L
BASOPHILS # BLD AUTO: 0.06 K/UL
BASOPHILS NFR BLD AUTO: 1.2 %
BILIRUB SERPL-MCNC: 0.3 MG/DL
BILIRUBIN URINE: NEGATIVE
BLOOD URINE: NEGATIVE
BUN SERPL-MCNC: 15 MG/DL
CALCIUM SERPL-MCNC: 9.2 MG/DL
CHLORIDE SERPL-SCNC: 103 MMOL/L
CHOLEST SERPL-MCNC: 175 MG/DL
CO2 SERPL-SCNC: 22 MMOL/L
COLOR: NORMAL
CREAT SERPL-MCNC: 0.74 MG/DL
EGFR: 89 ML/MIN/1.73M2
EOSINOPHIL # BLD AUTO: 0.17 K/UL
EOSINOPHIL NFR BLD AUTO: 3.3 %
ESTIMATED AVERAGE GLUCOSE: 134 MG/DL
GLUCOSE QUALITATIVE U: NEGATIVE
GLUCOSE SERPL-MCNC: 95 MG/DL
HBA1C MFR BLD HPLC: 6.3 %
HCT VFR BLD CALC: 44.2 %
HDLC SERPL-MCNC: 72 MG/DL
HGB BLD-MCNC: 14.2 G/DL
IMM GRANULOCYTES NFR BLD AUTO: 0.2 %
KETONES URINE: NEGATIVE
LDLC SERPL CALC-MCNC: 87 MG/DL
LEUKOCYTE ESTERASE URINE: NEGATIVE
LYMPHOCYTES # BLD AUTO: 1.8 K/UL
LYMPHOCYTES NFR BLD AUTO: 34.9 %
MAN DIFF?: NORMAL
MCHC RBC-ENTMCNC: 30.2 PG
MCHC RBC-ENTMCNC: 32.1 GM/DL
MCV RBC AUTO: 94 FL
MONOCYTES # BLD AUTO: 0.53 K/UL
MONOCYTES NFR BLD AUTO: 10.3 %
NEUTROPHILS # BLD AUTO: 2.59 K/UL
NEUTROPHILS NFR BLD AUTO: 50.1 %
NITRITE URINE: NEGATIVE
NONHDLC SERPL-MCNC: 102 MG/DL
PH URINE: 7
PLATELET # BLD AUTO: 284 K/UL
POTASSIUM SERPL-SCNC: 4.9 MMOL/L
PROT SERPL-MCNC: 6.9 G/DL
PROTEIN URINE: NEGATIVE
RBC # BLD: 4.7 M/UL
RBC # FLD: 13.5 %
SODIUM SERPL-SCNC: 140 MMOL/L
SPECIFIC GRAVITY URINE: 1.01
TRIGL SERPL-MCNC: 78 MG/DL
UROBILINOGEN URINE: NORMAL
WBC # FLD AUTO: 5.16 K/UL

## 2022-04-19 NOTE — H&P ADULT - PROBLEM SELECTOR PLAN 5
Tazorac Counseling:  Patient advised that medication is irritating and drying.  Patient may need to apply sparingly and wash off after an hour before eventually leaving it on overnight.  The patient verbalized understanding of the proper use and possible adverse effects of tazorac.  All of the patient's questions and concerns were addressed. Include Pregnancy/Lactation Warning?: No Spironolactone Counseling: Patient advised regarding risks of diarrhea, abdominal pain, hyperkalemia, birth defects (for female patients), liver toxicity and renal toxicity. The patient may need blood work to monitor liver and kidney function and potassium levels while on therapy. The patient verbalized understanding of the proper use and possible adverse effects of spironolactone.  All of the patient's questions and concerns were addressed. Topical Clindamycin Counseling: Patient counseled that this medication may cause skin irritation or allergic reactions.  In the event of skin irritation, the patient was advised to reduce the amount of the drug applied or use it less frequently.   The patient verbalized understanding of the proper use and possible adverse effects of clindamycin.  All of the patient's questions and concerns were addressed. Winlevi Counseling:  I discussed with the patient the risks of topical clascoterone including but not limited to erythema, scaling, itching, and stinging. Patient voiced their understanding. Dapsone Pregnancy And Lactation Text: This medication is Pregnancy Category C and is not considered safe during pregnancy or breast feeding. Erythromycin Counseling:  I discussed with the patient the risks of erythromycin including but not limited to GI upset, allergic reaction, drug rash, diarrhea, increase in liver enzymes, and yeast infections. Doxycycline Pregnancy And Lactation Text: This medication is Pregnancy Category D and not consider safe during pregnancy. It is also excreted in breast milk but is considered safe for shorter treatment courses. Detail Level: Simple Topical Retinoid counseling:  Patient advised to apply a pea-sized amount only at bedtime and wait 30 minutes after washing their face before applying.  If too drying, patient may add a non-comedogenic moisturizer. The patient verbalized understanding of the proper use and possible adverse effects of retinoids.  All of the patient's questions and concerns were addressed. Minocycline Pregnancy And Lactation Text: This medication is Pregnancy Category D and not consider safe during pregnancy. It is also excreted in breast milk. Minocycline Counseling: Patient advised regarding possible photosensitivity and discoloration of the teeth, skin, lips, tongue and gums.  Patient instructed to avoid sunlight, if possible.  When exposed to sunlight, patients should wear protective clothing, sunglasses, and sunscreen.  The patient was instructed to call the office immediately if the following severe adverse effects occur:  hearing changes, easy bruising/bleeding, severe headache, or vision changes.  The patient verbalized understanding of the proper use and possible adverse effects of minocycline.  All of the patient's questions and concerns were addressed. Sarecycline Counseling: Patient advised regarding possible photosensitivity and discoloration of the teeth, skin, lips, tongue and gums.  Patient instructed to avoid sunlight, if possible.  When exposed to sunlight, patients should wear protective clothing, sunglasses, and sunscreen.  The patient was instructed to call the office immediately if the following severe adverse effects occur:  hearing changes, easy bruising/bleeding, severe headache, or vision changes.  The patient verbalized understanding of the proper use and possible adverse effects of sarecycline.  All of the patient's questions and concerns were addressed. High Dose Vitamin A Pregnancy And Lactation Text: High dose vitamin A therapy is contraindicated during pregnancy and breast feeding. Erythromycin Pregnancy And Lactation Text: This medication is Pregnancy Category B and is considered safe during pregnancy. It is also excreted in breast milk. High Dose Vitamin A Counseling: Side effects reviewed, pt to contact office should one occur. Doxycycline Counseling:  Patient counseled regarding possible photosensitivity and increased risk for sunburn.  Patient instructed to avoid sunlight, if possible.  When exposed to sunlight, patients should wear protective clothing, sunglasses, and sunscreen.  The patient was instructed to call the office immediately if the following severe adverse effects occur:  hearing changes, easy bruising/bleeding, severe headache, or vision changes.  The patient verbalized understanding of the proper use and possible adverse effects of doxycycline.  All of the patient's questions and concerns were addressed. Sunscreen Recommendations: SPF Topical Retinoid Pregnancy And Lactation Text: This medication is Pregnancy Category C. It is unknown if this medication is excreted in breast milk. Azelaic Acid Pregnancy And Lactation Text: This medication is considered safe during pregnancy and breast feeding. Isotretinoin Counseling: Patient should get monthly blood tests, not donate blood, not drive at night if vision affected, not share medication, and not undergo elective surgery for 6 months after tx completed. Side effects reviewed, pt to contact office should one occur. Topical Clindamycin Pregnancy And Lactation Text: This medication is Pregnancy Category B and is considered safe during pregnancy. It is unknown if it is excreted in breast milk. Tazorac Pregnancy And Lactation Text: This medication is not safe during pregnancy. It is unknown if this medication is excreted in breast milk. Birth Control Pills Counseling: Birth Control Pill Counseling: I discussed with the patient the potential side effects of OCPs including but not limited to increased risk of stroke, heart attack, thrombophlebitis, deep venous thrombosis, hepatic adenomas, breast changes, GI upset, headaches, and depression.  The patient verbalized understanding of the proper use and possible adverse effects of OCPs. All of the patient's questions and concerns were addressed. Detail Level: Detailed Dapsone Counseling: I discussed with the patient the risks of dapsone including but not limited to hemolytic anemia, agranulocytosis, rashes, methemoglobinemia, kidney failure, peripheral neuropathy, headaches, GI upset, and liver toxicity.  Patients who start dapsone require monitoring including baseline LFTs and weekly CBCs for the first month, then every month thereafter.  The patient verbalized understanding of the proper use and possible adverse effects of dapsone.  All of the patient's questions and concerns were addressed. Benzoyl Peroxide Counseling: Patient counseled that medicine may cause skin irritation and bleach clothing.  In the event of skin irritation, the patient was advised to reduce the amount of the drug applied or use it less frequently.   The patient verbalized understanding of the proper use and possible adverse effects of benzoyl peroxide.  All of the patient's questions and concerns were addressed. Winlevi Pregnancy And Lactation Text: This medication is considered safe during pregnancy and breastfeeding. Isotretinoin Pregnancy And Lactation Text: This medication is Pregnancy Category X and is considered extremely dangerous during pregnancy. It is unknown if it is excreted in breast milk. Azelaic Acid Counseling: Patient counseled that medicine may cause skin irritation and to avoid applying near the eyes.  In the event of skin irritation, the patient was advised to reduce the amount of the drug applied or use it less frequently.   The patient verbalized understanding of the proper use and possible adverse effects of azelaic acid.  All of the patient's questions and concerns were addressed. Bactrim Pregnancy And Lactation Text: This medication is Pregnancy Category D and is known to cause fetal risk.  It is also excreted in breast milk. Topical Sulfur Applications Counseling: Topical Sulfur Counseling: Patient counseled that this medication may cause skin irritation or allergic reactions.  In the event of skin irritation, the patient was advised to reduce the amount of the drug applied or use it less frequently.   The patient verbalized understanding of the proper use and possible adverse effects of topical sulfur application.  All of the patient's questions and concerns were addressed. Azithromycin Pregnancy And Lactation Text: This medication is considered safe during pregnancy and is also secreted in breast milk. Azithromycin Counseling:  I discussed with the patient the risks of azithromycin including but not limited to GI upset, allergic reaction, drug rash, diarrhea, and yeast infections. Birth Control Pills Pregnancy And Lactation Text: This medication should be avoided if pregnant and for the first 30 days post-partum. Spironolactone Pregnancy And Lactation Text: This medication can cause feminization of the male fetus and should be avoided during pregnancy. The active metabolite is also found in breast milk. Acute erythematous non papular rash of chest, possibly secondary to drug interaction. Patient HD stable. Denies SOB.  Cipro IV discontinued. Saline flushed.  Will give 25 mg PO Benadryl x 1 Bactrim Counseling:  I discussed with the patient the risks of sulfa antibiotics including but not limited to GI upset, allergic reaction, drug rash, diarrhea, dizziness, photosensitivity, and yeast infections.  Rarely, more serious reactions can occur including but not limited to aplastic anemia, agranulocytosis, methemoglobinemia, blood dyscrasias, liver or kidney failure, lung infiltrates or desquamative/blistering drug rashes. Topical Sulfur Applications Pregnancy And Lactation Text: This medication is Pregnancy Category C and has an unknown safety profile during pregnancy. It is unknown if this topical medication is excreted in breast milk. Tetracycline Counseling: Patient counseled regarding possible photosensitivity and increased risk for sunburn.  Patient instructed to avoid sunlight, if possible.  When exposed to sunlight, patients should wear protective clothing, sunglasses, and sunscreen.  The patient was instructed to call the office immediately if the following severe adverse effects occur:  hearing changes, easy bruising/bleeding, severe headache, or vision changes.  The patient verbalized understanding of the proper use and possible adverse effects of tetracycline.  All of the patient's questions and concerns were addressed. Patient understands to avoid pregnancy while on therapy due to potential birth defects. Benzoyl Peroxide Pregnancy And Lactation Text: This medication is Pregnancy Category C. It is unknown if benzoyl peroxide is excreted in breast milk. Acute erythematous non papular rash of chest, possibly secondary to drug interaction, althout patient has received cipro/flagyl in past. Patient HD stable. Will continue antibiotics. Patient denies SOB.  Will give 25 mg PO Benadryl q4hr PRN rash/itching Continue statin and H2 blocker

## 2022-05-04 DIAGNOSIS — B00.9 HERPESVIRAL INFECTION, UNSPECIFIED: ICD-10-CM

## 2022-05-04 RX ORDER — VALACYCLOVIR 500 MG/1
500 TABLET, FILM COATED ORAL
Qty: 30 | Refills: 1 | Status: ACTIVE | COMMUNITY
Start: 2022-05-04 | End: 1900-01-01

## 2022-05-14 ENCOUNTER — RX RENEWAL (OUTPATIENT)
Age: 67
End: 2022-05-14

## 2022-06-01 ENCOUNTER — NON-APPOINTMENT (OUTPATIENT)
Age: 67
End: 2022-06-01

## 2022-06-01 ENCOUNTER — APPOINTMENT (OUTPATIENT)
Dept: CARDIOLOGY | Facility: CLINIC | Age: 67
End: 2022-06-01
Payer: COMMERCIAL

## 2022-06-01 VITALS
OXYGEN SATURATION: 97 % | DIASTOLIC BLOOD PRESSURE: 83 MMHG | SYSTOLIC BLOOD PRESSURE: 176 MMHG | HEIGHT: 61.5 IN | HEART RATE: 65 BPM | WEIGHT: 154 LBS | BODY MASS INDEX: 28.7 KG/M2

## 2022-06-01 PROCEDURE — 99214 OFFICE O/P EST MOD 30 MIN: CPT

## 2022-06-01 PROCEDURE — 93000 ELECTROCARDIOGRAM COMPLETE: CPT

## 2022-06-23 ENCOUNTER — APPOINTMENT (OUTPATIENT)
Dept: INTERNAL MEDICINE | Facility: CLINIC | Age: 67
End: 2022-06-23
Payer: COMMERCIAL

## 2022-06-23 PROCEDURE — 99214 OFFICE O/P EST MOD 30 MIN: CPT | Mod: 95

## 2022-07-05 ENCOUNTER — RX RENEWAL (OUTPATIENT)
Age: 67
End: 2022-07-05

## 2022-07-07 ENCOUNTER — APPOINTMENT (OUTPATIENT)
Dept: CARDIOLOGY | Facility: CLINIC | Age: 67
End: 2022-07-07

## 2022-07-07 PROCEDURE — 93790 AMBL BP MNTR W/SW I&R: CPT

## 2022-07-19 LAB
25(OH)D3 SERPL-MCNC: 47.1 NG/ML
ALBUMIN SERPL ELPH-MCNC: 4.6 G/DL
ALP BLD-CCNC: 93 U/L
ALT SERPL-CCNC: 22 U/L
ANION GAP SERPL CALC-SCNC: 10 MMOL/L
APPEARANCE: CLEAR
AST SERPL-CCNC: 21 U/L
BASOPHILS # BLD AUTO: 0.05 K/UL
BASOPHILS NFR BLD AUTO: 0.8 %
BILIRUB SERPL-MCNC: 0.5 MG/DL
BILIRUBIN URINE: NEGATIVE
BLOOD URINE: NEGATIVE
BUN SERPL-MCNC: 14 MG/DL
CALCIUM SERPL-MCNC: 9.8 MG/DL
CHLORIDE SERPL-SCNC: 104 MMOL/L
CHOLEST SERPL-MCNC: 181 MG/DL
CO2 SERPL-SCNC: 28 MMOL/L
COLOR: COLORLESS
CREAT SERPL-MCNC: 0.7 MG/DL
CREAT SPEC-SCNC: 26 MG/DL
EGFR: 95 ML/MIN/1.73M2
EOSINOPHIL # BLD AUTO: 0.22 K/UL
EOSINOPHIL NFR BLD AUTO: 3.7 %
ESTIMATED AVERAGE GLUCOSE: 137 MG/DL
FOLATE SERPL-MCNC: >20 NG/ML
GLUCOSE QUALITATIVE U: NEGATIVE
GLUCOSE SERPL-MCNC: 109 MG/DL
HBA1C MFR BLD HPLC: 6.4 %
HCT VFR BLD CALC: 43.7 %
HDLC SERPL-MCNC: 74 MG/DL
HGB BLD-MCNC: 14.1 G/DL
IMM GRANULOCYTES NFR BLD AUTO: 0.3 %
KETONES URINE: NEGATIVE
LDLC SERPL CALC-MCNC: 89 MG/DL
LEUKOCYTE ESTERASE URINE: NEGATIVE
LYMPHOCYTES # BLD AUTO: 1.86 K/UL
LYMPHOCYTES NFR BLD AUTO: 30.9 %
MAN DIFF?: NORMAL
MCHC RBC-ENTMCNC: 30.4 PG
MCHC RBC-ENTMCNC: 32.3 GM/DL
MCV RBC AUTO: 94.2 FL
MICROALBUMIN 24H UR DL<=1MG/L-MCNC: <1.2 MG/DL
MICROALBUMIN/CREAT 24H UR-RTO: NORMAL MG/G
MONOCYTES # BLD AUTO: 0.71 K/UL
MONOCYTES NFR BLD AUTO: 11.8 %
NEUTROPHILS # BLD AUTO: 3.16 K/UL
NEUTROPHILS NFR BLD AUTO: 52.5 %
NITRITE URINE: NEGATIVE
NONHDLC SERPL-MCNC: 108 MG/DL
PH URINE: 6.5
PLATELET # BLD AUTO: 266 K/UL
POTASSIUM SERPL-SCNC: 5.4 MMOL/L
PROT SERPL-MCNC: 7.2 G/DL
PROTEIN URINE: NEGATIVE
RBC # BLD: 4.64 M/UL
RBC # FLD: 13.5 %
SODIUM SERPL-SCNC: 142 MMOL/L
SPECIFIC GRAVITY URINE: 1
TRIGL SERPL-MCNC: 90 MG/DL
TSH SERPL-ACNC: 1.25 UIU/ML
UROBILINOGEN URINE: NORMAL
VIT B12 SERPL-MCNC: 650 PG/ML
WBC # FLD AUTO: 6.02 K/UL

## 2022-07-21 ENCOUNTER — APPOINTMENT (OUTPATIENT)
Dept: INTERNAL MEDICINE | Facility: CLINIC | Age: 67
End: 2022-07-21

## 2022-07-21 VITALS
HEIGHT: 61.5 IN | HEART RATE: 67 BPM | OXYGEN SATURATION: 99 % | DIASTOLIC BLOOD PRESSURE: 85 MMHG | BODY MASS INDEX: 28.89 KG/M2 | RESPIRATION RATE: 14 BRPM | TEMPERATURE: 97.6 F | SYSTOLIC BLOOD PRESSURE: 162 MMHG | WEIGHT: 155 LBS

## 2022-07-21 VITALS — DIASTOLIC BLOOD PRESSURE: 80 MMHG | SYSTOLIC BLOOD PRESSURE: 160 MMHG

## 2022-07-21 DIAGNOSIS — Z87.09 PERSONAL HISTORY OF OTHER DISEASES OF THE RESPIRATORY SYSTEM: ICD-10-CM

## 2022-07-21 DIAGNOSIS — R94.31 ABNORMAL ELECTROCARDIOGRAM [ECG] [EKG]: ICD-10-CM

## 2022-07-21 DIAGNOSIS — Z12.11 ENCOUNTER FOR SCREENING FOR MALIGNANT NEOPLASM OF COLON: ICD-10-CM

## 2022-07-21 LAB
DATE COLLECTED: NORMAL
HEMOCCULT SP1 STL QL: NEGATIVE

## 2022-07-21 PROCEDURE — 99397 PER PM REEVAL EST PAT 65+ YR: CPT | Mod: 25

## 2022-07-21 PROCEDURE — 82270 OCCULT BLOOD FECES: CPT

## 2022-07-21 RX ORDER — METHYLPREDNISOLONE 4 MG/1
4 TABLET ORAL
Qty: 21 | Refills: 0 | Status: DISCONTINUED | COMMUNITY
Start: 2022-06-23 | End: 2022-07-21

## 2022-07-21 RX ORDER — AZITHROMYCIN 250 MG/1
250 TABLET, FILM COATED ORAL
Qty: 1 | Refills: 0 | Status: DISCONTINUED | COMMUNITY
Start: 2022-06-23 | End: 2022-07-21

## 2022-08-29 ENCOUNTER — TRANSCRIPTION ENCOUNTER (OUTPATIENT)
Age: 67
End: 2022-08-29

## 2022-09-06 ENCOUNTER — RESULT REVIEW (OUTPATIENT)
Age: 67
End: 2022-09-06

## 2022-09-12 ENCOUNTER — RX RENEWAL (OUTPATIENT)
Age: 67
End: 2022-09-12

## 2022-10-12 ENCOUNTER — OUTPATIENT (OUTPATIENT)
Dept: OUTPATIENT SERVICES | Facility: HOSPITAL | Age: 67
LOS: 1 days | End: 2022-10-12
Payer: COMMERCIAL

## 2022-10-12 ENCOUNTER — APPOINTMENT (OUTPATIENT)
Dept: ULTRASOUND IMAGING | Facility: CLINIC | Age: 67
End: 2022-10-12

## 2022-10-12 ENCOUNTER — APPOINTMENT (OUTPATIENT)
Dept: MAMMOGRAPHY | Facility: CLINIC | Age: 67
End: 2022-10-12

## 2022-10-12 DIAGNOSIS — C53.9 MALIGNANT NEOPLASM OF CERVIX UTERI, UNSPECIFIED: Chronic | ICD-10-CM

## 2022-10-12 DIAGNOSIS — Z00.8 ENCOUNTER FOR OTHER GENERAL EXAMINATION: ICD-10-CM

## 2022-10-12 PROCEDURE — 76641 ULTRASOUND BREAST COMPLETE: CPT | Mod: 26,50

## 2022-10-12 PROCEDURE — 76641 ULTRASOUND BREAST COMPLETE: CPT

## 2022-10-12 PROCEDURE — 77063 BREAST TOMOSYNTHESIS BI: CPT | Mod: 26

## 2022-10-12 PROCEDURE — 77063 BREAST TOMOSYNTHESIS BI: CPT

## 2022-10-12 PROCEDURE — 77067 SCR MAMMO BI INCL CAD: CPT

## 2022-10-12 PROCEDURE — 77067 SCR MAMMO BI INCL CAD: CPT | Mod: 26

## 2022-10-16 ENCOUNTER — NON-APPOINTMENT (OUTPATIENT)
Age: 67
End: 2022-10-16

## 2022-11-03 ENCOUNTER — OUTPATIENT (OUTPATIENT)
Dept: OUTPATIENT SERVICES | Facility: HOSPITAL | Age: 67
LOS: 1 days | End: 2022-11-03
Payer: COMMERCIAL

## 2022-11-03 ENCOUNTER — APPOINTMENT (OUTPATIENT)
Dept: MRI IMAGING | Facility: CLINIC | Age: 67
End: 2022-11-03

## 2022-11-03 ENCOUNTER — RX RENEWAL (OUTPATIENT)
Age: 67
End: 2022-11-03

## 2022-11-03 DIAGNOSIS — Z00.8 ENCOUNTER FOR OTHER GENERAL EXAMINATION: ICD-10-CM

## 2022-11-03 DIAGNOSIS — C53.9 MALIGNANT NEOPLASM OF CERVIX UTERI, UNSPECIFIED: Chronic | ICD-10-CM

## 2022-11-03 PROCEDURE — 73718 MRI LOWER EXTREMITY W/O DYE: CPT | Mod: 26,LT

## 2022-11-03 PROCEDURE — 73718 MRI LOWER EXTREMITY W/O DYE: CPT

## 2022-11-04 ENCOUNTER — OUTPATIENT (OUTPATIENT)
Dept: OUTPATIENT SERVICES | Facility: HOSPITAL | Age: 67
LOS: 1 days | End: 2022-11-04
Payer: COMMERCIAL

## 2022-11-04 VITALS
HEIGHT: 62 IN | DIASTOLIC BLOOD PRESSURE: 79 MMHG | HEART RATE: 70 BPM | TEMPERATURE: 98 F | SYSTOLIC BLOOD PRESSURE: 149 MMHG | RESPIRATION RATE: 18 BRPM | OXYGEN SATURATION: 96 % | WEIGHT: 156.97 LBS

## 2022-11-04 DIAGNOSIS — M25.572 PAIN IN LEFT ANKLE AND JOINTS OF LEFT FOOT: ICD-10-CM

## 2022-11-04 DIAGNOSIS — I10 ESSENTIAL (PRIMARY) HYPERTENSION: ICD-10-CM

## 2022-11-04 DIAGNOSIS — M79.672 PAIN IN LEFT FOOT: ICD-10-CM

## 2022-11-04 DIAGNOSIS — M24.572 CONTRACTURE, LEFT ANKLE: ICD-10-CM

## 2022-11-04 DIAGNOSIS — C53.9 MALIGNANT NEOPLASM OF CERVIX UTERI, UNSPECIFIED: Chronic | ICD-10-CM

## 2022-11-04 DIAGNOSIS — M65.872 OTHER SYNOVITIS AND TENOSYNOVITIS, LEFT ANKLE AND FOOT: ICD-10-CM

## 2022-11-04 DIAGNOSIS — M77.42 METATARSALGIA, LEFT FOOT: ICD-10-CM

## 2022-11-04 DIAGNOSIS — Z01.818 ENCOUNTER FOR OTHER PREPROCEDURAL EXAMINATION: ICD-10-CM

## 2022-11-04 DIAGNOSIS — M71.572 OTHER BURSITIS, NOT ELSEWHERE CLASSIFIED, LEFT ANKLE AND FOOT: ICD-10-CM

## 2022-11-04 LAB
ANION GAP SERPL CALC-SCNC: 12 MMOL/L — SIGNIFICANT CHANGE UP (ref 5–17)
BUN SERPL-MCNC: 18 MG/DL — SIGNIFICANT CHANGE UP (ref 7–23)
CALCIUM SERPL-MCNC: 9.3 MG/DL — SIGNIFICANT CHANGE UP (ref 8.4–10.5)
CHLORIDE SERPL-SCNC: 103 MMOL/L — SIGNIFICANT CHANGE UP (ref 96–108)
CO2 SERPL-SCNC: 25 MMOL/L — SIGNIFICANT CHANGE UP (ref 22–31)
CREAT SERPL-MCNC: 0.66 MG/DL — SIGNIFICANT CHANGE UP (ref 0.5–1.3)
EGFR: 96 ML/MIN/1.73M2 — SIGNIFICANT CHANGE UP
GLUCOSE SERPL-MCNC: 103 MG/DL — HIGH (ref 70–99)
HCT VFR BLD CALC: 43.6 % — SIGNIFICANT CHANGE UP (ref 34.5–45)
HGB BLD-MCNC: 14.1 G/DL — SIGNIFICANT CHANGE UP (ref 11.5–15.5)
MCHC RBC-ENTMCNC: 29.6 PG — SIGNIFICANT CHANGE UP (ref 27–34)
MCHC RBC-ENTMCNC: 32.3 GM/DL — SIGNIFICANT CHANGE UP (ref 32–36)
MCV RBC AUTO: 91.6 FL — SIGNIFICANT CHANGE UP (ref 80–100)
NRBC # BLD: 0 /100 WBCS — SIGNIFICANT CHANGE UP (ref 0–0)
PLATELET # BLD AUTO: 269 K/UL — SIGNIFICANT CHANGE UP (ref 150–400)
POTASSIUM SERPL-MCNC: 3.6 MMOL/L — SIGNIFICANT CHANGE UP (ref 3.5–5.3)
POTASSIUM SERPL-SCNC: 3.6 MMOL/L — SIGNIFICANT CHANGE UP (ref 3.5–5.3)
RBC # BLD: 4.76 M/UL — SIGNIFICANT CHANGE UP (ref 3.8–5.2)
RBC # FLD: 12.7 % — SIGNIFICANT CHANGE UP (ref 10.3–14.5)
SODIUM SERPL-SCNC: 140 MMOL/L — SIGNIFICANT CHANGE UP (ref 135–145)
WBC # BLD: 7.33 K/UL — SIGNIFICANT CHANGE UP (ref 3.8–10.5)
WBC # FLD AUTO: 7.33 K/UL — SIGNIFICANT CHANGE UP (ref 3.8–10.5)

## 2022-11-04 PROCEDURE — G0463: CPT

## 2022-11-04 PROCEDURE — 85027 COMPLETE CBC AUTOMATED: CPT

## 2022-11-04 PROCEDURE — 36415 COLL VENOUS BLD VENIPUNCTURE: CPT

## 2022-11-04 PROCEDURE — 80048 BASIC METABOLIC PNL TOTAL CA: CPT

## 2022-11-04 RX ORDER — LISINOPRIL 2.5 MG/1
1 TABLET ORAL
Qty: 0 | Refills: 0 | DISCHARGE

## 2022-11-04 RX ORDER — METOPROLOL TARTRATE 50 MG
1 TABLET ORAL
Qty: 0 | Refills: 0 | DISCHARGE

## 2022-11-04 RX ORDER — CLOPIDOGREL BISULFATE 75 MG/1
1 TABLET, FILM COATED ORAL
Qty: 0 | Refills: 0 | DISCHARGE

## 2022-11-04 RX ORDER — SODIUM CHLORIDE 9 MG/ML
3 INJECTION INTRAMUSCULAR; INTRAVENOUS; SUBCUTANEOUS EVERY 8 HOURS
Refills: 0 | Status: DISCONTINUED | OUTPATIENT
Start: 2022-11-18 | End: 2022-12-02

## 2022-11-04 RX ORDER — SODIUM CHLORIDE 9 MG/ML
1000 INJECTION, SOLUTION INTRAVENOUS
Refills: 0 | Status: DISCONTINUED | OUTPATIENT
Start: 2022-11-18 | End: 2022-12-02

## 2022-11-04 NOTE — H&P PST ADULT - PROBLEM SELECTOR PLAN 1
Preop instructions and chlorhexidine soap given  Labs CBC BMP performed in PST  Instructed to take last dose of Plavix on 11/10 or as directed by Cardiologist.   Pt may continue with Aspirin regimen  Covid test on 11/15

## 2022-11-04 NOTE — H&P PST ADULT - HISTORY OF PRESENT ILLNESS
This is a 67 old female with past medical history of HTN, HLD, NSTEMI, CAD x 2 stents (LAD x1- 2007, Ramus x 1-2012), cervical cancer, c/o intermittent right foot pain ~ 8 months, aggravated by walking, s/p MRI, reports torn ligaments of the 3rd and 4th metatarsal. Intervention recommended. Scheduled for right foot 3rd and 4th metatarsal osteotomy with plantar plate repair on 03/22/19    Presenting to PST for scheduled left foot chilectomy plantar plate repair osteotomy 2nd metatarsal hammertoe correction 2nd with Dr. Paredes    **Covid swab on 11/15  ** Reports having Covid June 2022- Mild symptoms, denies hospitalization.   ** Covid vaccinated   This is a 67 old female with past medical history of HTN, HLD, NSTEMI, CAD x 2 stents (LAD x1- 2007, Ramus x 1-2012), cervical cancer, c/o intermittent right foot pain ~ 8 months, aggravated by walking, s/p MRI, reports torn ligaments of the 3rd and 4th metatarsal.         Intervention recommended. Scheduled for right foot 3rd and 4th metatarsal osteotomy with plantar plate repair on 03/22/19    Presenting to PST for scheduled left foot chilectomy plantar plate repair osteotomy 2nd metatarsal hammertoe correction 2nd with Dr. Paredes    **Covid swab on 11/15  ** Reports having Covid June 2022- Mild symptoms, denies hospitalization.   ** Covid vaccinated   This is a 67 old female with past medical history of HTN, HLD, NSTEMI, CAD x 2 stents (LAD x1- 2007, Ramus x 1-2012), cervical cancer, S/P right foot 3rd and 4th metatarsal osteotomy with plantar plate repair on 03/22/19. Today is presenting to [facility] for scheduled left foot chilectomy plantar plate repair osteotomy 2nd metatarsal hammertoe correction 2nd with Dr. Paredes    **Covid swab on 11/15 @ [facility]  ** Reports having Covid June 2022- Mild symptoms, denies hospitalization.   ** Covid vaccinated

## 2022-11-07 ENCOUNTER — TRANSCRIPTION ENCOUNTER (OUTPATIENT)
Age: 67
End: 2022-11-07

## 2022-11-07 ENCOUNTER — NON-APPOINTMENT (OUTPATIENT)
Age: 67
End: 2022-11-07

## 2022-11-09 ENCOUNTER — NON-APPOINTMENT (OUTPATIENT)
Age: 67
End: 2022-11-09

## 2022-11-09 ENCOUNTER — APPOINTMENT (OUTPATIENT)
Dept: CARDIOLOGY | Facility: CLINIC | Age: 67
End: 2022-11-09

## 2022-11-09 VITALS
BODY MASS INDEX: 28.89 KG/M2 | OXYGEN SATURATION: 97 % | SYSTOLIC BLOOD PRESSURE: 133 MMHG | HEIGHT: 61.5 IN | DIASTOLIC BLOOD PRESSURE: 85 MMHG | WEIGHT: 155 LBS | HEART RATE: 65 BPM

## 2022-11-09 PROCEDURE — 99214 OFFICE O/P EST MOD 30 MIN: CPT

## 2022-11-09 PROCEDURE — 93000 ELECTROCARDIOGRAM COMPLETE: CPT

## 2022-11-17 ENCOUNTER — TRANSCRIPTION ENCOUNTER (OUTPATIENT)
Age: 67
End: 2022-11-17

## 2022-11-18 ENCOUNTER — TRANSCRIPTION ENCOUNTER (OUTPATIENT)
Age: 67
End: 2022-11-18

## 2022-11-18 ENCOUNTER — RESULT REVIEW (OUTPATIENT)
Age: 67
End: 2022-11-18

## 2022-11-18 ENCOUNTER — OUTPATIENT (OUTPATIENT)
Dept: OUTPATIENT SERVICES | Facility: HOSPITAL | Age: 67
LOS: 1 days | End: 2022-11-18
Payer: COMMERCIAL

## 2022-11-18 VITALS
SYSTOLIC BLOOD PRESSURE: 129 MMHG | HEART RATE: 74 BPM | DIASTOLIC BLOOD PRESSURE: 58 MMHG | RESPIRATION RATE: 16 BRPM | OXYGEN SATURATION: 95 % | TEMPERATURE: 98 F

## 2022-11-18 VITALS
HEART RATE: 66 BPM | SYSTOLIC BLOOD PRESSURE: 179 MMHG | RESPIRATION RATE: 16 BRPM | TEMPERATURE: 97 F | OXYGEN SATURATION: 99 % | WEIGHT: 156.97 LBS | DIASTOLIC BLOOD PRESSURE: 77 MMHG | HEIGHT: 62 IN

## 2022-11-18 DIAGNOSIS — M79.672 PAIN IN LEFT FOOT: ICD-10-CM

## 2022-11-18 DIAGNOSIS — C53.9 MALIGNANT NEOPLASM OF CERVIX UTERI, UNSPECIFIED: Chronic | ICD-10-CM

## 2022-11-18 DIAGNOSIS — M24.572 CONTRACTURE, LEFT ANKLE: ICD-10-CM

## 2022-11-18 DIAGNOSIS — M71.572 OTHER BURSITIS, NOT ELSEWHERE CLASSIFIED, LEFT ANKLE AND FOOT: ICD-10-CM

## 2022-11-18 DIAGNOSIS — M77.42 METATARSALGIA, LEFT FOOT: ICD-10-CM

## 2022-11-18 DIAGNOSIS — M65.872 OTHER SYNOVITIS AND TENOSYNOVITIS, LEFT ANKLE AND FOOT: ICD-10-CM

## 2022-11-18 PROCEDURE — 28308 INCISION OF METATARSAL: CPT | Mod: T4

## 2022-11-18 PROCEDURE — C1713: CPT

## 2022-11-18 PROCEDURE — 28285 REPAIR OF HAMMERTOE: CPT | Mod: T1

## 2022-11-18 PROCEDURE — 73630 X-RAY EXAM OF FOOT: CPT

## 2022-11-18 PROCEDURE — C1769: CPT

## 2022-11-18 PROCEDURE — 73630 X-RAY EXAM OF FOOT: CPT | Mod: 26,LT

## 2022-11-18 PROCEDURE — 76000 FLUOROSCOPY <1 HR PHYS/QHP: CPT

## 2022-11-18 DEVICE — GWIRE TROCAR TIP 1.6X150MM: Type: IMPLANTABLE DEVICE | Status: FUNCTIONAL

## 2022-11-18 DEVICE — SYS IMP CPR MINI SCORPION AND SUT LASSO MICRO: Type: IMPLANTABLE DEVICE | Status: FUNCTIONAL

## 2022-11-18 RX ORDER — LIDOCAINE HCL 20 MG/ML
0.2 VIAL (ML) INJECTION ONCE
Refills: 0 | Status: COMPLETED | OUTPATIENT
Start: 2022-11-18 | End: 2022-11-18

## 2022-11-18 RX ORDER — CHLORHEXIDINE GLUCONATE 213 G/1000ML
1 SOLUTION TOPICAL ONCE
Refills: 0 | Status: COMPLETED | OUTPATIENT
Start: 2022-11-18 | End: 2022-11-18

## 2022-11-18 RX ADMIN — SODIUM CHLORIDE 100 MILLILITER(S): 9 INJECTION, SOLUTION INTRAVENOUS at 10:20

## 2022-11-18 RX ADMIN — SODIUM CHLORIDE 3 MILLILITER(S): 9 INJECTION INTRAMUSCULAR; INTRAVENOUS; SUBCUTANEOUS at 10:19

## 2022-11-18 RX ADMIN — CHLORHEXIDINE GLUCONATE 1 APPLICATION(S): 213 SOLUTION TOPICAL at 10:19

## 2022-11-18 NOTE — ASU DISCHARGE PLAN (ADULT/PEDIATRIC) - ASU DC SPECIAL INSTRUCTIONSFT
Keep dressing dry clean and intact until follow up appointment  Weight bearing as tolerated in darco post op shoe at all times  Please follow up with Dr. Jude Paredes next week

## 2022-11-18 NOTE — ASU DISCHARGE PLAN (ADULT/PEDIATRIC) - WEIGHT BEARING INSTRUCTIONS
Left message for patient to call back x3. Spoke also with his father who stated that he would have him call back later today.   weight bearing as tolerated to left foot wearing darco post op shoe at all times

## 2022-11-18 NOTE — ASU PATIENT PROFILE, ADULT - FALL HARM RISK - UNIVERSAL INTERVENTIONS
Bed in lowest position, wheels locked, appropriate side rails in place/Call bell, personal items and telephone in reach/Instruct patient to call for assistance before getting out of bed or chair/Non-slip footwear when patient is out of bed/[place] to call system/Physically safe environment - no spills, clutter or unnecessary equipment/Purposeful Proactive Rounding/Room/bathroom lighting operational, light cord in reach

## 2022-11-18 NOTE — ASU PATIENT PROFILE, ADULT - PREOP PAIN SCORE
0 Cheek-To-Nose Interpolation Flap Text: A decision was made to reconstruct the defect utilizing an interpolation axial flap and a staged reconstruction.  A telfa template was made of the defect.  This telfa template was then used to outline the Cheek-To-Nose Interpolation flap.  The donor area for the pedicle flap was then injected with anesthesia.  The flap was excised through the skin and subcutaneous tissue down to the layer of the underlying musculature.  The interpolation flap was carefully excised within this deep plane to maintain its blood supply.  The edges of the donor site were undermined.   The donor site was closed in a primary fashion.  The pedicle was then rotated into position and sutured.  Once the tube was sutured into place, adequate blood supply was confirmed with blanching and refill.  The pedicle was then wrapped with xeroform gauze and dressed appropriately with a telfa and gauze bandage to ensure continued blood supply and protect the attached pedicle.

## 2022-11-18 NOTE — ASU DISCHARGE PLAN (ADULT/PEDIATRIC) - NS MD DC FALL RISK RISK
For information on Fall & Injury Prevention, visit: [email or web address] OR  [email or web address] OR  https://www.cdc.gov/steadi/patient.html

## 2022-11-18 NOTE — ASU DISCHARGE PLAN (ADULT/PEDIATRIC) - CARE PROVIDER_API CALL
Jude Paredes  FOOT SURGERY  [address], [address]  [place] [address]  Phone: [phone]  Fax: [phone]  Follow Up Time:

## 2022-11-21 ENCOUNTER — APPOINTMENT (OUTPATIENT)
Dept: INTERNAL MEDICINE | Facility: CLINIC | Age: 67
End: 2022-11-21

## 2022-12-15 ENCOUNTER — RX RENEWAL (OUTPATIENT)
Age: 67
End: 2022-12-15

## 2023-01-12 NOTE — ED PROVIDER NOTE - CONDITION AT DISCHARGE:
Still waiting for the form. Sent a My Chart message to the patient that we still have not received this form.  Marleen Gagnon [facility]  2nd Floor  Primary Care     Improved

## 2023-01-13 ENCOUNTER — NON-APPOINTMENT (OUTPATIENT)
Age: 68
End: 2023-01-13

## 2023-01-19 ENCOUNTER — APPOINTMENT (OUTPATIENT)
Dept: INTERNAL MEDICINE | Facility: CLINIC | Age: 68
End: 2023-01-19
Payer: COMMERCIAL

## 2023-01-19 DIAGNOSIS — J02.9 ACUTE PHARYNGITIS, UNSPECIFIED: ICD-10-CM

## 2023-01-19 PROCEDURE — 99214 OFFICE O/P EST MOD 30 MIN: CPT | Mod: 95

## 2023-02-09 NOTE — ASU DISCHARGE PLAN (ADULT/PEDIATRIC) - FREQUENT HAND WASHING PREVENTS THE SPREAD OF INFECTION.
Statement Selected [Follow-Up Visit] : a follow-up visit for [Morbid Obesity (BMI>40)] : morbid obesity (bmi>40)

## 2023-03-18 ENCOUNTER — INPATIENT (INPATIENT)
Facility: HOSPITAL | Age: 68
LOS: 1 days | Discharge: HOME | DRG: 392 | End: 2023-03-19
Attending: FAMILY MEDICINE | Admitting: STUDENT IN AN ORGANIZED HEALTH CARE EDUCATION/TRAINING PROGRAM
Payer: MEDICARE

## 2023-03-18 VITALS
RESPIRATION RATE: 16 BRPM | SYSTOLIC BLOOD PRESSURE: 154 MMHG | OXYGEN SATURATION: 97 % | DIASTOLIC BLOOD PRESSURE: 91 MMHG | HEIGHT: 61 IN | TEMPERATURE: 97 F | WEIGHT: 154.98 LBS | HEART RATE: 79 BPM

## 2023-03-18 DIAGNOSIS — N83.201 UNSPECIFIED OVARIAN CYST, RIGHT SIDE: ICD-10-CM

## 2023-03-18 DIAGNOSIS — K57.32 DIVERTICULITIS OF LARGE INTESTINE WITHOUT PERFORATION OR ABSCESS WITHOUT BLEEDING: ICD-10-CM

## 2023-03-18 DIAGNOSIS — Z29.9 ENCOUNTER FOR PROPHYLACTIC MEASURES, UNSPECIFIED: ICD-10-CM

## 2023-03-18 DIAGNOSIS — I25.10 ATHEROSCLEROTIC HEART DISEASE OF NATIVE CORONARY ARTERY WITHOUT ANGINA PECTORIS: ICD-10-CM

## 2023-03-18 DIAGNOSIS — I10 ESSENTIAL (PRIMARY) HYPERTENSION: ICD-10-CM

## 2023-03-18 DIAGNOSIS — K21.9 GASTRO-ESOPHAGEAL REFLUX DISEASE WITHOUT ESOPHAGITIS: ICD-10-CM

## 2023-03-18 DIAGNOSIS — C53.9 MALIGNANT NEOPLASM OF CERVIX UTERI, UNSPECIFIED: Chronic | ICD-10-CM

## 2023-03-18 DIAGNOSIS — J30.2 OTHER SEASONAL ALLERGIC RHINITIS: ICD-10-CM

## 2023-03-18 DIAGNOSIS — E78.5 HYPERLIPIDEMIA, UNSPECIFIED: ICD-10-CM

## 2023-03-18 LAB
ALBUMIN SERPL ELPH-MCNC: 3.2 G/DL — LOW (ref 3.3–5)
ALBUMIN SERPL ELPH-MCNC: 3.6 G/DL — SIGNIFICANT CHANGE UP (ref 3.3–5)
ALP SERPL-CCNC: 76 U/L — SIGNIFICANT CHANGE UP (ref 40–120)
ALP SERPL-CCNC: 81 U/L — SIGNIFICANT CHANGE UP (ref 40–120)
ALT FLD-CCNC: 29 U/L — SIGNIFICANT CHANGE UP (ref 12–78)
ALT FLD-CCNC: 30 U/L — SIGNIFICANT CHANGE UP (ref 12–78)
ANION GAP SERPL CALC-SCNC: 5 MMOL/L — SIGNIFICANT CHANGE UP (ref 5–17)
ANION GAP SERPL CALC-SCNC: 7 MMOL/L — SIGNIFICANT CHANGE UP (ref 5–17)
APPEARANCE UR: CLEAR — SIGNIFICANT CHANGE UP
APTT BLD: 25 SEC — LOW (ref 27.5–35.5)
AST SERPL-CCNC: 14 U/L — LOW (ref 15–37)
AST SERPL-CCNC: 17 U/L — SIGNIFICANT CHANGE UP (ref 15–37)
BACTERIA # UR AUTO: ABNORMAL
BASOPHILS # BLD AUTO: 0.04 K/UL — SIGNIFICANT CHANGE UP (ref 0–0.2)
BASOPHILS # BLD AUTO: 0.04 K/UL — SIGNIFICANT CHANGE UP (ref 0–0.2)
BASOPHILS NFR BLD AUTO: 0.3 % — SIGNIFICANT CHANGE UP (ref 0–2)
BASOPHILS NFR BLD AUTO: 0.4 % — SIGNIFICANT CHANGE UP (ref 0–2)
BILIRUB SERPL-MCNC: 0.4 MG/DL — SIGNIFICANT CHANGE UP (ref 0.2–1.2)
BILIRUB SERPL-MCNC: 0.5 MG/DL — SIGNIFICANT CHANGE UP (ref 0.2–1.2)
BILIRUB UR-MCNC: NEGATIVE — SIGNIFICANT CHANGE UP
BUN SERPL-MCNC: 13 MG/DL — SIGNIFICANT CHANGE UP (ref 7–23)
BUN SERPL-MCNC: 19 MG/DL — SIGNIFICANT CHANGE UP (ref 7–23)
CALCIUM SERPL-MCNC: 8.4 MG/DL — LOW (ref 8.5–10.1)
CALCIUM SERPL-MCNC: 8.7 MG/DL — SIGNIFICANT CHANGE UP (ref 8.5–10.1)
CHLORIDE SERPL-SCNC: 104 MMOL/L — SIGNIFICANT CHANGE UP (ref 96–108)
CHLORIDE SERPL-SCNC: 109 MMOL/L — HIGH (ref 96–108)
CO2 SERPL-SCNC: 26 MMOL/L — SIGNIFICANT CHANGE UP (ref 22–31)
CO2 SERPL-SCNC: 27 MMOL/L — SIGNIFICANT CHANGE UP (ref 22–31)
COLOR SPEC: YELLOW — SIGNIFICANT CHANGE UP
CREAT SERPL-MCNC: 0.65 MG/DL — SIGNIFICANT CHANGE UP (ref 0.5–1.3)
CREAT SERPL-MCNC: 0.8 MG/DL — SIGNIFICANT CHANGE UP (ref 0.5–1.3)
DIFF PNL FLD: ABNORMAL
EGFR: 81 ML/MIN/1.73M2 — SIGNIFICANT CHANGE UP
EGFR: 96 ML/MIN/1.73M2 — SIGNIFICANT CHANGE UP
EOSINOPHIL # BLD AUTO: 0.16 K/UL — SIGNIFICANT CHANGE UP (ref 0–0.5)
EOSINOPHIL # BLD AUTO: 0.17 K/UL — SIGNIFICANT CHANGE UP (ref 0–0.5)
EOSINOPHIL NFR BLD AUTO: 1.4 % — SIGNIFICANT CHANGE UP (ref 0–6)
EOSINOPHIL NFR BLD AUTO: 1.7 % — SIGNIFICANT CHANGE UP (ref 0–6)
EPI CELLS # UR: SIGNIFICANT CHANGE UP
GLUCOSE SERPL-MCNC: 103 MG/DL — HIGH (ref 70–99)
GLUCOSE SERPL-MCNC: 134 MG/DL — HIGH (ref 70–99)
GLUCOSE UR QL: NEGATIVE — SIGNIFICANT CHANGE UP
HCT VFR BLD CALC: 37.3 % — SIGNIFICANT CHANGE UP (ref 34.5–45)
HCT VFR BLD CALC: 38 % — SIGNIFICANT CHANGE UP (ref 34.5–45)
HGB BLD-MCNC: 12.4 G/DL — SIGNIFICANT CHANGE UP (ref 11.5–15.5)
HGB BLD-MCNC: 12.8 G/DL — SIGNIFICANT CHANGE UP (ref 11.5–15.5)
IMM GRANULOCYTES NFR BLD AUTO: 0.3 % — SIGNIFICANT CHANGE UP (ref 0–0.9)
IMM GRANULOCYTES NFR BLD AUTO: 0.4 % — SIGNIFICANT CHANGE UP (ref 0–0.9)
INR BLD: 1 RATIO — SIGNIFICANT CHANGE UP (ref 0.88–1.16)
KETONES UR-MCNC: NEGATIVE — SIGNIFICANT CHANGE UP
LACTATE SERPL-SCNC: 1 MMOL/L — SIGNIFICANT CHANGE UP (ref 0.7–2)
LEUKOCYTE ESTERASE UR-ACNC: NEGATIVE — SIGNIFICANT CHANGE UP
LIDOCAIN IGE QN: 125 U/L — SIGNIFICANT CHANGE UP (ref 73–393)
LYMPHOCYTES # BLD AUTO: 1.61 K/UL — SIGNIFICANT CHANGE UP (ref 1–3.3)
LYMPHOCYTES # BLD AUTO: 1.63 K/UL — SIGNIFICANT CHANGE UP (ref 1–3.3)
LYMPHOCYTES # BLD AUTO: 13.6 % — SIGNIFICANT CHANGE UP (ref 13–44)
LYMPHOCYTES # BLD AUTO: 16.8 % — SIGNIFICANT CHANGE UP (ref 13–44)
MCHC RBC-ENTMCNC: 29.6 PG — SIGNIFICANT CHANGE UP (ref 27–34)
MCHC RBC-ENTMCNC: 30 PG — SIGNIFICANT CHANGE UP (ref 27–34)
MCHC RBC-ENTMCNC: 33.2 GM/DL — SIGNIFICANT CHANGE UP (ref 32–36)
MCHC RBC-ENTMCNC: 33.7 GM/DL — SIGNIFICANT CHANGE UP (ref 32–36)
MCV RBC AUTO: 89 FL — SIGNIFICANT CHANGE UP (ref 80–100)
MCV RBC AUTO: 89.2 FL — SIGNIFICANT CHANGE UP (ref 80–100)
MONOCYTES # BLD AUTO: 1.07 K/UL — HIGH (ref 0–0.9)
MONOCYTES # BLD AUTO: 1.18 K/UL — HIGH (ref 0–0.9)
MONOCYTES NFR BLD AUTO: 10 % — SIGNIFICANT CHANGE UP (ref 2–14)
MONOCYTES NFR BLD AUTO: 11 % — SIGNIFICANT CHANGE UP (ref 2–14)
NEUTROPHILS # BLD AUTO: 6.76 K/UL — SIGNIFICANT CHANGE UP (ref 1.8–7.4)
NEUTROPHILS # BLD AUTO: 8.75 K/UL — HIGH (ref 1.8–7.4)
NEUTROPHILS NFR BLD AUTO: 69.8 % — SIGNIFICANT CHANGE UP (ref 43–77)
NEUTROPHILS NFR BLD AUTO: 74.3 % — SIGNIFICANT CHANGE UP (ref 43–77)
NITRITE UR-MCNC: NEGATIVE — SIGNIFICANT CHANGE UP
NRBC # BLD: 0 /100 WBCS — SIGNIFICANT CHANGE UP (ref 0–0)
NRBC # BLD: 0 /100 WBCS — SIGNIFICANT CHANGE UP (ref 0–0)
PH UR: 6 — SIGNIFICANT CHANGE UP (ref 5–8)
PLATELET # BLD AUTO: 249 K/UL — SIGNIFICANT CHANGE UP (ref 150–400)
PLATELET # BLD AUTO: 276 K/UL — SIGNIFICANT CHANGE UP (ref 150–400)
POTASSIUM SERPL-MCNC: 3.6 MMOL/L — SIGNIFICANT CHANGE UP (ref 3.5–5.3)
POTASSIUM SERPL-MCNC: 3.7 MMOL/L — SIGNIFICANT CHANGE UP (ref 3.5–5.3)
POTASSIUM SERPL-SCNC: 3.6 MMOL/L — SIGNIFICANT CHANGE UP (ref 3.5–5.3)
POTASSIUM SERPL-SCNC: 3.7 MMOL/L — SIGNIFICANT CHANGE UP (ref 3.5–5.3)
PROT SERPL-MCNC: 6.4 G/DL — SIGNIFICANT CHANGE UP (ref 6–8.3)
PROT SERPL-MCNC: 6.8 G/DL — SIGNIFICANT CHANGE UP (ref 6–8.3)
PROT UR-MCNC: NEGATIVE — SIGNIFICANT CHANGE UP
PROTHROM AB SERPL-ACNC: 11.7 SEC — SIGNIFICANT CHANGE UP (ref 10.5–13.4)
RBC # BLD: 4.19 M/UL — SIGNIFICANT CHANGE UP (ref 3.8–5.2)
RBC # BLD: 4.26 M/UL — SIGNIFICANT CHANGE UP (ref 3.8–5.2)
RBC # FLD: 13 % — SIGNIFICANT CHANGE UP (ref 10.3–14.5)
RBC # FLD: 13.1 % — SIGNIFICANT CHANGE UP (ref 10.3–14.5)
RBC CASTS # UR COMP ASSIST: SIGNIFICANT CHANGE UP /HPF (ref 0–4)
SARS-COV-2 RNA SPEC QL NAA+PROBE: SIGNIFICANT CHANGE UP
SODIUM SERPL-SCNC: 137 MMOL/L — SIGNIFICANT CHANGE UP (ref 135–145)
SODIUM SERPL-SCNC: 141 MMOL/L — SIGNIFICANT CHANGE UP (ref 135–145)
SP GR SPEC: 1.01 — SIGNIFICANT CHANGE UP (ref 1.01–1.02)
UROBILINOGEN FLD QL: NEGATIVE — SIGNIFICANT CHANGE UP
WBC # BLD: 11.8 K/UL — HIGH (ref 3.8–10.5)
WBC # BLD: 9.69 K/UL — SIGNIFICANT CHANGE UP (ref 3.8–10.5)
WBC # FLD AUTO: 11.8 K/UL — HIGH (ref 3.8–10.5)
WBC # FLD AUTO: 9.69 K/UL — SIGNIFICANT CHANGE UP (ref 3.8–10.5)
WBC UR QL: SIGNIFICANT CHANGE UP

## 2023-03-18 PROCEDURE — 99053 MED SERV 10PM-8AM 24 HR FAC: CPT

## 2023-03-18 PROCEDURE — 99285 EMERGENCY DEPT VISIT HI MDM: CPT

## 2023-03-18 PROCEDURE — 93010 ELECTROCARDIOGRAM REPORT: CPT

## 2023-03-18 PROCEDURE — 71045 X-RAY EXAM CHEST 1 VIEW: CPT | Mod: 26

## 2023-03-18 PROCEDURE — 99222 1ST HOSP IP/OBS MODERATE 55: CPT | Mod: GC

## 2023-03-18 PROCEDURE — 74177 CT ABD & PELVIS W/CONTRAST: CPT | Mod: 26,MA

## 2023-03-18 PROCEDURE — 12345: CPT | Mod: NC

## 2023-03-18 RX ORDER — ACETAMINOPHEN 500 MG
650 TABLET ORAL EVERY 6 HOURS
Refills: 0 | Status: DISCONTINUED | OUTPATIENT
Start: 2023-03-18 | End: 2023-03-19

## 2023-03-18 RX ORDER — ACETAMINOPHEN 500 MG
2 TABLET ORAL
Qty: 0 | Refills: 0 | DISCHARGE

## 2023-03-18 RX ORDER — PIPERACILLIN AND TAZOBACTAM 4; .5 G/20ML; G/20ML
3.38 INJECTION, POWDER, LYOPHILIZED, FOR SOLUTION INTRAVENOUS EVERY 8 HOURS
Refills: 0 | Status: DISCONTINUED | OUTPATIENT
Start: 2023-03-18 | End: 2023-03-19

## 2023-03-18 RX ORDER — METOPROLOL TARTRATE 50 MG
50 TABLET ORAL DAILY
Refills: 0 | Status: DISCONTINUED | OUTPATIENT
Start: 2023-03-18 | End: 2023-03-19

## 2023-03-18 RX ORDER — EZETIMIBE 10 MG/1
10 TABLET ORAL DAILY
Refills: 0 | Status: DISCONTINUED | OUTPATIENT
Start: 2023-03-18 | End: 2023-03-19

## 2023-03-18 RX ORDER — LOSARTAN POTASSIUM 100 MG/1
50 TABLET, FILM COATED ORAL DAILY
Refills: 0 | Status: DISCONTINUED | OUTPATIENT
Start: 2023-03-18 | End: 2023-03-19

## 2023-03-18 RX ORDER — ASPIRIN/CALCIUM CARB/MAGNESIUM 324 MG
81 TABLET ORAL DAILY
Refills: 0 | Status: DISCONTINUED | OUTPATIENT
Start: 2023-03-18 | End: 2023-03-19

## 2023-03-18 RX ORDER — CLOPIDOGREL BISULFATE 75 MG/1
75 TABLET, FILM COATED ORAL DAILY
Refills: 0 | Status: DISCONTINUED | OUTPATIENT
Start: 2023-03-18 | End: 2023-03-19

## 2023-03-18 RX ORDER — PIPERACILLIN AND TAZOBACTAM 4; .5 G/20ML; G/20ML
3.38 INJECTION, POWDER, LYOPHILIZED, FOR SOLUTION INTRAVENOUS ONCE
Refills: 0 | Status: COMPLETED | OUTPATIENT
Start: 2023-03-18 | End: 2023-03-18

## 2023-03-18 RX ORDER — MONTELUKAST 4 MG/1
10 TABLET, CHEWABLE ORAL DAILY
Refills: 0 | Status: DISCONTINUED | OUTPATIENT
Start: 2023-03-18 | End: 2023-03-19

## 2023-03-18 RX ORDER — FAMOTIDINE 10 MG/ML
20 INJECTION INTRAVENOUS
Refills: 0 | Status: DISCONTINUED | OUTPATIENT
Start: 2023-03-18 | End: 2023-03-19

## 2023-03-18 RX ORDER — SODIUM CHLORIDE 9 MG/ML
1000 INJECTION INTRAMUSCULAR; INTRAVENOUS; SUBCUTANEOUS ONCE
Refills: 0 | Status: COMPLETED | OUTPATIENT
Start: 2023-03-18 | End: 2023-03-18

## 2023-03-18 RX ORDER — SIMVASTATIN 20 MG/1
20 TABLET, FILM COATED ORAL AT BEDTIME
Refills: 0 | Status: DISCONTINUED | OUTPATIENT
Start: 2023-03-18 | End: 2023-03-19

## 2023-03-18 RX ORDER — METOPROLOL TARTRATE 50 MG
3 TABLET ORAL
Qty: 0 | Refills: 0 | DISCHARGE

## 2023-03-18 RX ORDER — TRAMADOL HYDROCHLORIDE 50 MG/1
50 TABLET ORAL EVERY 6 HOURS
Refills: 0 | Status: DISCONTINUED | OUTPATIENT
Start: 2023-03-18 | End: 2023-03-19

## 2023-03-18 RX ORDER — SODIUM CHLORIDE 9 MG/ML
1000 INJECTION INTRAMUSCULAR; INTRAVENOUS; SUBCUTANEOUS
Refills: 0 | Status: DISCONTINUED | OUTPATIENT
Start: 2023-03-18 | End: 2023-03-18

## 2023-03-18 RX ORDER — FLUTICASONE PROPIONATE 50 MCG
1 SPRAY, SUSPENSION NASAL
Refills: 0 | Status: DISCONTINUED | OUTPATIENT
Start: 2023-03-18 | End: 2023-03-19

## 2023-03-18 RX ORDER — TRAMADOL HYDROCHLORIDE 50 MG/1
25 TABLET ORAL EVERY 6 HOURS
Refills: 0 | Status: DISCONTINUED | OUTPATIENT
Start: 2023-03-18 | End: 2023-03-19

## 2023-03-18 RX ORDER — CIPROFLOXACIN LACTATE 400MG/40ML
400 VIAL (ML) INTRAVENOUS EVERY 12 HOURS
Refills: 0 | Status: DISCONTINUED | OUTPATIENT
Start: 2023-03-18 | End: 2023-03-18

## 2023-03-18 RX ORDER — SACCHAROMYCES BOULARDII 250 MG
250 POWDER IN PACKET (EA) ORAL
Refills: 0 | Status: DISCONTINUED | OUTPATIENT
Start: 2023-03-18 | End: 2023-03-19

## 2023-03-18 RX ORDER — RANITIDINE HYDROCHLORIDE 150 MG/1
1 TABLET, FILM COATED ORAL
Qty: 0 | Refills: 0 | DISCHARGE

## 2023-03-18 RX ORDER — OMEGA-3 ACID ETHYL ESTERS 1 G
2 CAPSULE ORAL DAILY
Refills: 0 | Status: DISCONTINUED | OUTPATIENT
Start: 2023-03-18 | End: 2023-03-19

## 2023-03-18 RX ORDER — CIPROFLOXACIN LACTATE 400MG/40ML
400 VIAL (ML) INTRAVENOUS ONCE
Refills: 0 | Status: COMPLETED | OUTPATIENT
Start: 2023-03-18 | End: 2023-03-18

## 2023-03-18 RX ORDER — ACETAMINOPHEN 500 MG
1000 TABLET ORAL ONCE
Refills: 0 | Status: COMPLETED | OUTPATIENT
Start: 2023-03-18 | End: 2023-03-18

## 2023-03-18 RX ORDER — LACTOBACILLUS ACIDOPHILUS 100MM CELL
1 CAPSULE ORAL DAILY
Refills: 0 | Status: DISCONTINUED | OUTPATIENT
Start: 2023-03-18 | End: 2023-03-18

## 2023-03-18 RX ADMIN — Medication 1 SPRAY(S): at 11:44

## 2023-03-18 RX ADMIN — CLOPIDOGREL BISULFATE 75 MILLIGRAM(S): 75 TABLET, FILM COATED ORAL at 11:43

## 2023-03-18 RX ADMIN — Medication 650 MILLIGRAM(S): at 15:55

## 2023-03-18 RX ADMIN — Medication 2 GRAM(S): at 11:43

## 2023-03-18 RX ADMIN — SIMVASTATIN 20 MILLIGRAM(S): 20 TABLET, FILM COATED ORAL at 21:10

## 2023-03-18 RX ADMIN — SODIUM CHLORIDE 1000 MILLILITER(S): 9 INJECTION INTRAMUSCULAR; INTRAVENOUS; SUBCUTANEOUS at 00:57

## 2023-03-18 RX ADMIN — FAMOTIDINE 20 MILLIGRAM(S): 10 INJECTION INTRAVENOUS at 17:35

## 2023-03-18 RX ADMIN — Medication 650 MILLIGRAM(S): at 14:54

## 2023-03-18 RX ADMIN — Medication 100 MILLIGRAM(S): at 00:57

## 2023-03-18 RX ADMIN — PIPERACILLIN AND TAZOBACTAM 200 GRAM(S): 4; .5 INJECTION, POWDER, LYOPHILIZED, FOR SOLUTION INTRAVENOUS at 09:32

## 2023-03-18 RX ADMIN — Medication 81 MILLIGRAM(S): at 11:44

## 2023-03-18 RX ADMIN — Medication 400 MILLIGRAM(S): at 00:45

## 2023-03-18 RX ADMIN — PIPERACILLIN AND TAZOBACTAM 25 GRAM(S): 4; .5 INJECTION, POWDER, LYOPHILIZED, FOR SOLUTION INTRAVENOUS at 21:10

## 2023-03-18 RX ADMIN — MONTELUKAST 10 MILLIGRAM(S): 4 TABLET, CHEWABLE ORAL at 06:22

## 2023-03-18 RX ADMIN — EZETIMIBE 10 MILLIGRAM(S): 10 TABLET ORAL at 11:43

## 2023-03-18 RX ADMIN — FAMOTIDINE 20 MILLIGRAM(S): 10 INJECTION INTRAVENOUS at 06:23

## 2023-03-18 RX ADMIN — Medication 250 MILLIGRAM(S): at 06:23

## 2023-03-18 RX ADMIN — SODIUM CHLORIDE 75 MILLILITER(S): 9 INJECTION INTRAMUSCULAR; INTRAVENOUS; SUBCUTANEOUS at 06:26

## 2023-03-18 RX ADMIN — PIPERACILLIN AND TAZOBACTAM 25 GRAM(S): 4; .5 INJECTION, POWDER, LYOPHILIZED, FOR SOLUTION INTRAVENOUS at 14:55

## 2023-03-18 RX ADMIN — Medication 250 MILLIGRAM(S): at 17:34

## 2023-03-18 RX ADMIN — Medication 200 MILLIGRAM(S): at 03:48

## 2023-03-18 NOTE — H&P ADULT - PROBLEM SELECTOR PLAN 3
BP elevated on admission likely 2/2 pain  -Continue losartan, Toprol  -Monitor routine hemodynamics BP mildly elevated on admission likely 2/2 pain  -Continue losartan, Toprol  -Monitor routine hemodynamics

## 2023-03-18 NOTE — ED PROVIDER NOTE - NS ED MD DISPO ISOLATION TYPES
RECORDS RECEIVED FROM: internal   DATE RECEIVED: 11.20.20    NOTES STATUS DETAILS   OFFICE NOTE from referring provider    Internal  Vikash Wang    OFFICE NOTE from other cardiologist    Internal  6.4.20 Harper DUDLEY     DISCHARGE SUMMARY from hospital    na    DISCHARGE REPORT from the ER   na    OPERATIVE REPORT    na    MEDICATION LIST   Internal     LABS     BMP   Internal  8.7.18    CBC   Internal  4.7.20      CMP   Internal  10.15.20    Lipids   Internal  8.7.18      TSH   Internal  9.4.20    DIAGNOSTIC PROCEDURES     EKG   Internal  10.16.20    Monitor Reports   na    IMAGING (DISC & REPORT)      Echo   na    Stress Tests   na    Cath   na    MRI/MRA   na    CT/CTA   na      
None

## 2023-03-18 NOTE — H&P ADULT - PROBLEM SELECTOR PLAN 7
CT abd/p showing bilateral ovarian cysts, patient states these are known and she follows yearly with her OBGYN  -F/u outpatient

## 2023-03-18 NOTE — ED PROVIDER NOTE - CLINICAL SUMMARY MEDICAL DECISION MAKING FREE TEXT BOX
lower abdominal pain x 2 days and increased this evening, CT scan reporting acute sigmoid diverticulitis , IVF IVAB and admit to medicine

## 2023-03-18 NOTE — ED ADULT NURSE NOTE - IS THE PATIENT ABLE TO BE SCREENED?
NEONATOLOGY DAILY PROGRESS NOTE      Subjective     Nick Hopkins is a 4 day old old former Gestational Age: 32w2d, date of Birth 2022, birthweight 1680 g female infant admitted 2022  3:36 PM and was a   Inborn   baby.    Corrected Gestational Age: 32w6d      Nick Hopkins requires  Intensive Care for Hypoglycemia, Prematurity and Respiratory Distress of . With the need for continuous cardiorespiratory monitoring, monitoring of feeding tolerance, and temperature control.    Overnight Events  None      Objective     Vital signs reviewed  Visit Vitals  BP 76/48 (BP Location: RLE - Right lower extremity)   Pulse 138   Temp 97.9 °F (36.6 °C) (Axillary)   Resp 47   Ht 15.75\" (40 cm)   Wt (!) 1640 g   HC 30.5 cm (12.01\")   SpO2 100%   BMI 10.25 kg/m²        Current Weight (!) 1640 g (22 0900)   Most recent weights:  Weight    22 1536 22 1612 22 0900   Weight: (!) 1680 g (!) 1680 g (!) 1640 g     Weight Change Since Birth: -2%       Apnea/Bradycardia/Desaturation in last 24 hours    Patient Vitals for the past 24 hrs:   Apnea (secs) Event SpO2 Intervention Activity Prior to Event   22 0638 -- 66 Self limiting Sleeping   22 0145 -- 72 Self limiting Sleeping   22 2150 -- 65 Tactile stimulation, moderate;Reposition Sleeping   22 1357 10 secs 50 Tactile stimulation, moderate;Reposition --   22 1115 5 secs 51 Tactile stimulation, mild --         Lines,Tube Drains    Intubation  Necessity/Indication: Not Intubated  Readiness for Extubation discussed: N/A 2022    Urinary Catheter  Necessity/Indication: Not Catheterized  Continued need for Urinary Catheter discussed: N/A 2022    Central Line  Type of Central Line: No Lines Present      Necessity/Indication: No Central line in place  Continued need for Central Line discussed :  N/A 2022    Chest Tube  No 2022    Surgical Drains  No 2022      Fluids  Based off a Dosing Weight: 1700 g    Intake/Output:    Intake/Output  Report      07/24 0700 07/25 0659 07/25 0700 07/26 0659    NG/GT (mL/kg) 88 (53.66)     TPN (mL/kg) 122.63 (74.77) 5.11 (3.12)    Total Intake(mL/kg) 210.63 (128.43) 5.11 (3.12)    Urine (mL/kg/hr) 136 (3.46)     Stool (mL/kg/hr)      Total Output(mL/kg) 136 (82.93)     Net +74.63 +5.11                I/O this shift:  In: 5.11   Out: -       Urine: No data recorded Last Stool: 1 (07/23/22 2100)    UOP: see I/O table    Labs (Last 24 hours)  Recent Results (from the past 24 hour(s))   Basic Metabolic Panel    Collection Time: 07/25/22  5:32 AM   Result Value Ref Range    Fasting Status      Sodium 146 (H) 135 - 145 mmol/L    Potassium 4.1 3.5 - 6.0 mmol/L    Chloride 114 (H) 97 - 110 mmol/L    Carbon Dioxide 21 21 - 32 mmol/L    Anion Gap 15 7 - 19 mmol/L    Glucose 88 47 - 110 mg/dL    BUN 12 5 - 19 mg/dL    Creatinine 0.66 0.31 - 1.03 mg/dL    Glomerular Filtration Rate      BUN/ Creatinine Ratio 18 7 - 25    Calcium 9.5 7.6 - 11.3 mg/dL   Hepatic Function Panel    Collection Time: 07/25/22  5:32 AM   Result Value Ref Range    Albumin 2.6 2.5 - 3.4 g/dL    Bilirubin, Total 9.2 (H) 2.0 - 6.0 mg/dL    Bilirubin, Direct 0.3 0.0 - 0.6 mg/dL    Alkaline Phosphatase 346 (H) 95 - 255 Units/L    GPT/ALT 10 6 - 50 Units/L    GOT/AST 56 35 - 140 Units/L    Protein, Total 4.9 4.6 - 7.0 g/dL   Magnesium    Collection Time: 07/25/22  5:32 AM   Result Value Ref Range    Magnesium 2.5 1.3 - 2.7 mg/dL   Phosphorus    Collection Time: 07/25/22  5:32 AM   Result Value Ref Range    Phosphorus 5.1 4.8 - 8.2 mg/dL   Triglyceride    Collection Time: 07/25/22  5:32 AM   Result Value Ref Range    Fasting Status      Triglycerides 101 (H) <=74 mg/dL        Bilirubin, Total (mg/dL)   Date Value   2022 9.2 (H)   2022 8.1 (H)         Medications  Current  Facility-Administered Medications   Medication   • fat emulsion 20 % 17 mL /pediatric infusion   • parenteral nutrition /pediatric (less than 5 kg)   • caffeine citrate (CAFCIT) 17 mg in D5W /peds IV syringe          Vitals    24 Hour Range   Temperature   Temp  Min: 97.9 °F (36.6 °C)  Max: 99.9 °F (37.7 °C)   Pulse   Pulse  Min: 122  Max: 157   Respiratory   Resp  Min: 23  Max: 68   Blood Pressure   BP  Min: 47/36  Max: 76/48   Pulse Oximetry    SpO2  Min: 93 %  Max: 100 %       Physical Exam    Physical Exam  Vitals reviewed.   Constitutional:       General: She is active.      Appearance: Normal appearance.      Comments: Premature infant   HENT:      Head: Normocephalic. Anterior fontanelle is flat.      Right Ear: External ear normal.      Left Ear: External ear normal.      Nose: Nose normal.      Mouth/Throat:      Mouth: Mucous membranes are moist.      Pharynx: Oropharynx is clear.      Neck: Normal range of motion.   Eyes:      Conjunctiva/sclera: Conjunctivae normal.   Cardiovascular:      Rate and Rhythm: Normal rate and regular rhythm.      Pulses: Normal pulses.   Pulmonary:      Effort: Pulmonary effort is normal. No respiratory distress or retractions.      Breath sounds: Normal breath sounds.   Abdominal:      General: Bowel sounds are normal. There is no distension.      Palpations: Abdomen is soft.   Genitourinary:     General: Normal vulva.      Rectum: Normal.      Comments: External female genitalia consistent with prematurity    Musculoskeletal:         General: No deformity. Normal range of motion.   Skin:     General: Skin is warm and dry.      Capillary Refill: Capillary refill takes less than 2 seconds.      Turgor: Normal.   Neurological:      General: No focal deficit present.      Mental Status: She is alert.      Primitive Reflexes: Suck normal.         Assessment & Plan  by system     Former Gestational Age: 32w2d female infant, now corrected to  32w6d    Thermoregulation:    Baby is under - incubator      Fluids, Electrolytes and Nutrition:  Assessment:    infant.      Current:  - BM/dBM 12 ml q 3 hrs via ng  - pTPN  -  ml/kg  -  Na 146    Plan:  - Advance feeds to 14ml x2, then 16ml  -24kcal BM/DMB with HMF  - pTPN  - Increase  ml/kg   - TPN labs weekly  - BMP in am         Respiratory System:  Assessment: Respiratory Distress Syndrome (RDS)  - Surfactant: Not given    - Current support settings: HFNC 3L      Plan:  - Wean Respiratory Support as tolerated   - Monitor clinically      Apnea/Bradycardia/Desaturations:  Assessment: None    - Last Apnea:   ; Alarm Count Apnea: 1  - Intervention: Self limiting (22)  - Last Bradycardia:  Bradycardia (secs): 15 secs (22); Alarm Count Bradycardia: 1  - Interventions: Intervention: Self limiting (22)      - Caffeine ()      Plan:  - Continue Caffeine   - Monitor clinically for spells       Cardio Vascular System:   Assessment: None  -     Plan:  - No active issues       HEENT:   Assessment: None  -     Plan:    - No active issues     Gastrointestinal System:  Assessment: None  -     Plan:  - No active issues      Genitourinary System:  Assessment: None  -   Plan:  - No active issues      Hematology:  Assessment: At risk for Anemia of Prematurity    Baby's blood type is B Rh Positive;  Blaine: neg  Maternal blood type is   Information for the patient's mother:  Stella Hopkins [[id number]]   B Rh Positive     Last Bilirubin:   Bilirubin, Total (mg/dL)   Date Value   2022 (H)     Last Hg:   HGB (g/dL)   Date Value   2022     Current:  -  Bili 9.2 (LL 11.9)    Plan:  - Repeat bili level  am      Infectious Disease:  Assessment: None  Early onset sepsis screen: N/A due to gestational age  -s/p 36h amp/gent upon admission, BCx NGTD      Endocrine System:  Assessment:  Hypoglycemia  - Patient has one episode of hypoglycemia,  one bolus of D10 2ml/kg was given    Plan:  - No active issues    Central Nervous System:  Assessment: At risk for IVH  -     Plan:   - CUS     Ophthalmology:  Assessment: None  -     Plan:   - No active issues        Genetics:  Assessment: None  -    Plan:  - No active issues        Musculo Skeletal:  Assessment: None  -    Plan:  - No active issues      Skin:  Assessment:  -    Plan:  - No active issues    Other:       Therapies:   - OT - No  - PT - No  - Speech - No            Screenings & Procedures   Immunizations:   There is no immunization history on file for this patient.   Hearing Test:    [place] ROP Eye Exam Needed?:   No  Car Seat Screen:    CCHD Screening:   Screening complete:    Right hand reading %:    Foot reading %:    CHD:    Circumcision:    [place] Screen- date drawn (most recent results):    Last 3 results:    Is patient a Synagis Candidate:   ( if yes, see Immunizations above for date of administration)      Parents plan to follow-up as an outpatient following discharge home with -     I have spoken with the nursing staff and the healthcare team and reviewed findings and plan of management.    Parents are updated by the NICU team on a regular basis    Updated Problem List under \" Problem List \" section - Yes 2022   Patient Active Problem List   Diagnosis   • Prematurity, 1,500-1,749 grams, 31-32 completed weeks   • Liveborn by    • Triplet birth   • RDS (respiratory distress syndrome in the )   • Prematurity   •  hypoglycemia   • Hyperbilirubinemia of prematurity   • Apnea of prematurity         Lashay Foote, CNP    Neonatology Attending Attestation    Patient was evaluated by ROSY Duron and myself.  I have reviewed the EMR , pertinent clinical findings and consultant  recommendations. Care plan formulated and discussed with multidisciplinary NICU team. I agree with the assessment and management as documented in the note.       Sammie  MD Hebert  Attending Neonatologist           Yes

## 2023-03-18 NOTE — H&P ADULT - PROBLEM SELECTOR PLAN 1
Patient with 2 days of progressive abdominal pain similar to previous episodes of diverticulitis  -CT abd/p showing acute diverticulitis of sigmoid colon  -Admit to general medicine floor  -Does not meet sepsis criteria on admission  -Given IV Cipro, IV Clinda in ED, continue  -CLD diet  -IV Tylenol PRN for pain control  -GI Dr. Fernandez consulted Patient with 2 days of progressive abdominal pain similar to previous episodes of diverticulitis  -CT abd/p showing acute diverticulitis of sigmoid colon  -Admit to general medicine floor  -Does not meet sepsis criteria on admission  -Given IV Cipro, IV Clinda in ED, continue. Probiotics while on abx.  -CLD diet  -Tylenol PRN for pain control  - Zofran PRN nausea  -GI Dr. Fernandez consulted  -Outpatient surgical follow up for consideration of prophylactic colectomy given 3rd episode of diverticulitis in the sigmoid region. She has followed up with a surgeon recommended by Dr. Wheatley after her 2nd episode.

## 2023-03-18 NOTE — H&P ADULT - NSVTERISKASSESS_GEN_ALL_CORE FT
Medical Assessment Completed on: 18-Mar-2023 04:41 Medical Assessment Completed on: 18-Mar-2023 04:44

## 2023-03-18 NOTE — H&P ADULT - NSHPSOCIALHISTORY_GEN_ALL_CORE
Tobacco: denies  EtOH: denies  Recreational drug use: denies  Lives with:   Ambulates: independent  ADLs: independent

## 2023-03-18 NOTE — ED PROVIDER NOTE - LOCATION
no headache, no chest pain, no SOB, no palpitations, no fever no chills,  no n/v/d, no urinary symptoms, no GI  bleeding. no neuro changes.

## 2023-03-18 NOTE — H&P ADULT - NSHPREVIEWOFSYSTEMS_GEN_ALL_CORE
General: no fever, chills  Eyes: no vision changes  ENT: no hearing changes, nasal congestion, or sore throat  CV: no chest pain or palpitations  Pulm: no SOB, wheezing, cough, or hemoptysis  Abd/GI: no nausea, vomiting, diarrhea, constipation, abd pain  : no dysuria, hematuria, urinary frequency  MSK: no joint pain or myalgias  Neuro: no syncope, dizziness, focal weakness  Psych: no anxiety or depression  Endo: no heat or cold intolerance General: +chills, no fever,  Eyes: no vision changes  ENT: no hearing changes, nasal congestion, or sore throat  CV: no chest pain or palpitations  Pulm: no SOB, wheezing, cough, or hemoptysis  Abd/GI: +abd pain, +nausea resolved, no vomiting, diarrhea, constipation  : no dysuria, hematuria, urinary frequency  MSK: no joint pain or myalgias  Neuro: no syncope, dizziness, focal weakness  Psych: no anxiety or depression  Endo: no heat or cold intolerance

## 2023-03-18 NOTE — PROGRESS NOTE ADULT - SUBJECTIVE AND OBJECTIVE BOX
Patient was seen and examined around 12 noon.  Abdominal pain is improving.  Denies nausea or vomiting.   No BM since yesterday.    PHYSICAL EXAM:  Vital Signs   T(C): 36.8 (18 Mar 2023 10:25), Max: 36.8 (18 Mar 2023 10:25)  T(F): 98.2 (18 Mar 2023 10:25), Max: 98.2 (18 Mar 2023 10:25)  HR: 64 (18 Mar 2023 10:25) (64 - 84)  BP: 120/75 (18 Mar 2023 10:25) (108/65 - 154/91)  RR: 18 (18 Mar 2023 10:25) (16 - 18)  SpO2: 96% (18 Mar 2023 10:25) (96% - 98%)  Parameters below as of 18 Mar 2023 10:25  Patient On (Oxygen Delivery Method): room air  General: Elderly female sitting in bed comfortably. No acute distress  HEENT: EOMI. Clear conjunctivae. Moist mucus membrane  Neck: Supple.   Chest: CTA bilaterally - no wheezing, rales or rhonchi.   Heart: Normal S1 & S2 with RRR.   Abdomen: Obese. Soft. Non-tender. + BS  Ext: No pedal edema. No calf tenderness   Neuro: AAO x 3. No focal deficit. No speech disorder.  Skin: Warm and Dry  Psychiatry: Normal mood and affect    Labs and radiology reviewed.     Plan:  Continue current treatment.  Changed Cipro and Clindamycin to Zosyn (allergic to PCN however tolerated in past and today as well).     Please refer to H&P from earlier today for more details.

## 2023-03-18 NOTE — ED ADULT NURSE NOTE - OBJECTIVE STATEMENT
pt a/o x 4 with a calm affect c/o lower left abdominal pain without fever. pt has Hx of diverticulitis and states it feels like that again.  pending initial lab work results and CT.

## 2023-03-18 NOTE — H&P ADULT - HISTORY OF PRESENT ILLNESS
67 year old female with PMHx of CAD/NSTEMI s/p 1 stent in L[facility] 2007 and 1 stent in [facility] 2012, HTN, HLD, cervical cancer, history of recurrent diverticulitis presented to the ED complaining of worsening abdominal pain.    In the ED,  Vital Signs T(F) Max: 97.3 HR: 79 BP: 154/91 RR: 16 SpO2: 97%  Labs significant for: WBC 11.80, Glucose 134, UA with small blood  CXR: no acute infiltrates or effusions on personal review  CT abdomen/pelvis with IV contrast: acute diverticulitis of the sigmoid colon.  EKG:  67 year old female with PMHx of CAD/NSTEMI s/p 1 stent in LAD 2007 and 1 stent in [place] 2012, HTN, HLD, cervical cancer, GERD, history of recurrent diverticulitis presented to the ED with abdominal pain. Onset yesterday. States pain located RLQ/suprapubic, has gradually worsened since onset. Patient also c/o nausea that has resolved, also c/o chills and states she had 3 BM yesterday which is unusual for her. Denies any vomiting, fevers, diarrhea, CP, SOB, dysuria. Patient has had 2 episodes of diverticulitis, states this feels similar.    In the ED,  Vital Signs T(F) Max: 97.3 HR: 79 BP: 154/91 RR: 16 SpO2: 97%  Labs significant for: WBC 11.80, Glucose 134, UA with small blood  CXR: no acute infiltrates or effusions on personal review  CT abdomen/pelvis with IV contrast: acute diverticulitis of the sigmoid colon.  EKG:   Given: 1g IV Tylenol, IV Clinda, 1L NS bolus x1, IV Cipro 67 year old female with PMHx of CAD/NSTEMI s/p 1 stent in LAD 2007 and 1 stent in [place] 2012, HTN, HLD, cervical cancer, GERD, history of recurrent diverticulitis presented to the ED with abdominal pain. Onset yesterday. States pain located RLQ/suprapubic, has gradually worsened since onset. Patient also c/o nausea that has resolved, also c/o chills and states she had 3 BM yesterday which is unusual for her. Denies any vomiting, fevers, diarrhea, CP, SOB, dysuria. Patient has had 2 episodes of diverticulitis in past, states this feels similar.    In the ED,  Vital Signs T(F) Max: 97.3 HR: 79 BP: 154/91 RR: 16 SpO2: 97%  Labs significant for: WBC 11.80, Glucose 134, UA with small blood  CXR: no acute infiltrates or effusions on personal review  CT abdomen/pelvis with IV contrast: acute diverticulitis of the sigmoid colon.  EKG:   Given: 1g IV Tylenol, IV Clinda, 1L NS bolus x1, IV Cipro 67 year old female with PMHx of CAD/NSTEMI s/p 1 stent in LAD 2007 and 1 stent in [place] 2012, HTN, HLD, cervical cancer, GERD, history of recurrent diverticulitis presented to the ED with abdominal pain. Onset yesterday. States pain located RLQ/suprapubic, has gradually worsened since onset. Patient also c/o nausea that has resolved, also c/o chills and states she had 3 BM yesterday which is unusual for her. Denies any vomiting, fevers, diarrhea, CP, SOB, dysuria. Patient has had 2 episodes of diverticulitis in past, states this feels similar.    In the ED,  Vital Signs T(F) Max: 97.3 HR: 79 BP: 154/91 RR: 16 SpO2: 97%  Labs significant for: WBC 11.80, Glucose 134, UA with small blood  CXR: no acute infiltrates or effusions on personal review  CT abdomen/pelvis with IV contrast: acute diverticulitis of the sigmoid colon.  Given: 1g IV Tylenol, IV Clinda, 1L NS bolus x1, IV Cipro 67 year old female with PMHx of CAD/NSTEMI s/p 1 stent in LAD 2007 and 1 stent in [place] 2012, HTN, HLD, cervical cancer, GERD, history of recurrent diverticulitis presented to the ED with abdominal pain. Onset yesterday. States pain located RLQ/suprapubic, has gradually worsened since onset. Patient also c/o nausea that has resolved, also c/o chills and states she had 3 BM yesterday which is unusual for her. Denies any vomiting, fevers, diarrhea, CP, SOB, dysuria. Patient has had 2 episodes of diverticulitis in past, states this feels similar. Her last episode was 2 years ago - she had a colonoscopy with Dr. Wheatley a few weeks after that bout. She also saw a surgeon at that time per Dr. Wheatley's recommendation.    In the ED,  Vital Signs T(F) Max: 97.3 HR: 79 BP: 154/91 RR: 16 SpO2: 97%  Labs significant for: WBC 11.80, Glucose 134, UA with small blood  CXR: no acute infiltrates or effusions on personal review  CT abdomen/pelvis with IV contrast: acute diverticulitis of the sigmoid colon.  Given: 1g IV Tylenol, IV Clinda, 1L NS bolus x1, IV Cipro

## 2023-03-18 NOTE — H&P ADULT - ASSESSMENT
67 year old female with PMHx of CAD/NSTEMI s/p 1 stent in LAD 2007 and 1 stent in [place] 2012, HTN, HLD, cervical cancer, history of recurrent diverticulitis admitted with acute sigmoid diverticulitis. 67 year old female with PMHx of CAD/NSTEMI s/p 1 stent in LAD 2007 and 1 stent in [place] 2012, HTN, HLD, cervical cancer, GERD, history of recurrent diverticulitis admitted with acute sigmoid diverticulitis.

## 2023-03-18 NOTE — ED ADULT TRIAGE NOTE - CHIEF COMPLAINT QUOTE
Pt has lower abd pain all day. 3 soft bms today denies bloody stool. hx of diverticulitis. pt feels nauseous. took 600 mg of motrin 1 hour ago.

## 2023-03-18 NOTE — H&P ADULT - NSHPPHYSICALEXAM_GEN_ALL_CORE
T(C): 36.3 (03-18-23 @ 00:04), Max: 36.3 (03-18-23 @ 00:04)  HR: 79 (03-18-23 @ 00:04) (79 - 79)  BP: 154/91 (03-18-23 @ 00:04) (154/91 - 154/91)  RR: 16 (03-18-23 @ 00:04) (16 - 16)  SpO2: 97% (03-18-23 @ 00:04) (97% - 97%)    General: No apparent distress  Head: normocephalic, atraumatic  Eyes: EOMI, anicteric  ENT: moist mucous membranes, no pharyngeal exudates  Heart: rrr, S1, S2, no murmurs  Chest: CTA b/l, no rales, rhonchi, or wheezes  Abd: BS+, soft, NT, ND  Back: no CVA tenderness  Extr: no edema or cyanosis  Neuro: AA&Ox3, no focal weakness, sensation to light touch intact  Psych: normal affect T(C): 36.3 (03-18-23 @ 00:04), Max: 36.3 (03-18-23 @ 00:04)  HR: 79 (03-18-23 @ 00:04) (79 - 79)  BP: 154/91 (03-18-23 @ 00:04) (154/91 - 154/91)  RR: 16 (03-18-23 @ 00:04) (16 - 16)  SpO2: 97% (03-18-23 @ 00:04) (97% - 97%)    General: No apparent distress  Head: normocephalic, atraumatic  Eyes: EOMI, anicteric  ENT: dry mucous membranes, no pharyngeal exudates  Heart: rrr, S1, S2, no murmurs  Chest: CTA b/l, no rales, rhonchi, or wheezes  Abd: BS+, soft, +mildly tender to palpation RLQ, suprabupic, ND  Extr: no edema or cyanosis  Vasc: 2+ dorsalis pedis pulses bilat  Neuro: moving all extremities spontaneously, answering questions appropriately  Psych: normal affect T(C): 36.3 (03-18-23 @ 00:04), Max: 36.3 (03-18-23 @ 00:04)  HR: 79 (03-18-23 @ 00:04) (79 - 79)  BP: 154/91 (03-18-23 @ 00:04) (154/91 - 154/91)  RR: 16 (03-18-23 @ 00:04) (16 - 16)  SpO2: 97% (03-18-23 @ 00:04) (97% - 97%)    General: No apparent distress, appears well  Head: normocephalic, atraumatic  Eyes: EOMI, anicteric  ENT: dry mucous membranes, no pharyngeal exudates  Heart: rrr, S1, S2, no murmurs  Chest: CTA b/l, no rales, rhonchi, or wheezes  Abd: BS+, soft, +tender to palpation RLQ, suprapubic area, ND  Extr: no edema or cyanosis  Vasc: 2+ dorsalis pedis pulses bilat  Neuro: moving all extremities spontaneously, answering questions appropriately  Psych: normal affect

## 2023-03-18 NOTE — H&P ADULT - PROBLEM SELECTOR PLAN 2
Hx of CAD s/p PCI x2  -Continue Toprol, ASA, Plavix, statin, zetia, fish oil Hx of CAD s/p PCI x2  -Continue Toprol, ASA, Plavix, statin, Zetia, fish oil

## 2023-03-18 NOTE — ED ADULT NURSE NOTE - CCCP TRG CHIEF CMPLNT
-- DO NOT REPLY / DO NOT REPLY ALL --  -- Message is from the [facility]--    Provider paged via [employer] Documentation - The below message was copied and pasted from a [employer] page:      2019 10:38:44 AM   [facility] Contact Center[facility] NURSE   Emilia Nath   Secure Text   [phone] PATIENT NUMBER -------------------------------- ACC NURSE LINE (IF QUESTIONS ONLY - [phone]) FROM: MILES NATH PATIENT [facility] 1969. PATIENT DECLINED TO GO TO THE ED FOR MODERATE BILATERAL LEG SWELLING MORE SO ON THE LEFT. AREA IS RED TENDER AND WARM TO TOUCH. PLEASE CALL PATIENT BACK [phone] TO FURTHER ADVISE. OK TO LEAVE MESSAGE IF PATIENT DOES NOT ANSWER.RAMILA OTOOLE [facility]    
abdominal pain

## 2023-03-18 NOTE — H&P ADULT - TIME BILLING
activities including direct patient care, counseling and/or coordinating care, reviewing notes/lab data/imaging, and discussion with multidisciplinary team

## 2023-03-18 NOTE — ED PROVIDER NOTE - OBJECTIVE STATEMENT
66 y/o female h/o diverticulitis, c/c lower abdominal pain x 2 days and increased this evening, she took a dose of Cipro at home. no headache, no chest pain, no SOB, no palpitations, no fever no chills,  no n/v/d, no urinary symptoms, no GI  bleeding. no neuro changes.

## 2023-03-19 ENCOUNTER — TRANSCRIPTION ENCOUNTER (OUTPATIENT)
Age: 68
End: 2023-03-19

## 2023-03-19 VITALS
TEMPERATURE: 98 F | RESPIRATION RATE: 19 BRPM | SYSTOLIC BLOOD PRESSURE: 139 MMHG | OXYGEN SATURATION: 93 % | HEART RATE: 64 BPM | DIASTOLIC BLOOD PRESSURE: 74 MMHG

## 2023-03-19 LAB
ANION GAP SERPL CALC-SCNC: 3 MMOL/L — LOW (ref 5–17)
BASOPHILS # BLD AUTO: 0.04 K/UL — SIGNIFICANT CHANGE UP (ref 0–0.2)
BASOPHILS NFR BLD AUTO: 0.7 % — SIGNIFICANT CHANGE UP (ref 0–2)
BUN SERPL-MCNC: 7 MG/DL — SIGNIFICANT CHANGE UP (ref 7–23)
CALCIUM SERPL-MCNC: 8.6 MG/DL — SIGNIFICANT CHANGE UP (ref 8.5–10.1)
CHLORIDE SERPL-SCNC: 111 MMOL/L — HIGH (ref 96–108)
CO2 SERPL-SCNC: 29 MMOL/L — SIGNIFICANT CHANGE UP (ref 22–31)
CREAT SERPL-MCNC: 0.65 MG/DL — SIGNIFICANT CHANGE UP (ref 0.5–1.3)
CULTURE RESULTS: SIGNIFICANT CHANGE UP
EGFR: 96 ML/MIN/1.73M2 — SIGNIFICANT CHANGE UP
EOSINOPHIL # BLD AUTO: 0.17 K/UL — SIGNIFICANT CHANGE UP (ref 0–0.5)
EOSINOPHIL NFR BLD AUTO: 2.9 % — SIGNIFICANT CHANGE UP (ref 0–6)
GLUCOSE SERPL-MCNC: 109 MG/DL — HIGH (ref 70–99)
HCT VFR BLD CALC: 37.6 % — SIGNIFICANT CHANGE UP (ref 34.5–45)
HCV AB S/CO SERPL IA: 0.08 S/CO — SIGNIFICANT CHANGE UP (ref 0–0.99)
HCV AB SERPL-IMP: SIGNIFICANT CHANGE UP
HGB BLD-MCNC: 12.7 G/DL — SIGNIFICANT CHANGE UP (ref 11.5–15.5)
IMM GRANULOCYTES NFR BLD AUTO: 0.5 % — SIGNIFICANT CHANGE UP (ref 0–0.9)
LYMPHOCYTES # BLD AUTO: 1.89 K/UL — SIGNIFICANT CHANGE UP (ref 1–3.3)
LYMPHOCYTES # BLD AUTO: 32.6 % — SIGNIFICANT CHANGE UP (ref 13–44)
MAGNESIUM SERPL-MCNC: 2.2 MG/DL — SIGNIFICANT CHANGE UP (ref 1.6–2.6)
MCHC RBC-ENTMCNC: 30.8 PG — SIGNIFICANT CHANGE UP (ref 27–34)
MCHC RBC-ENTMCNC: 33.8 GM/DL — SIGNIFICANT CHANGE UP (ref 32–36)
MCV RBC AUTO: 91 FL — SIGNIFICANT CHANGE UP (ref 80–100)
MONOCYTES # BLD AUTO: 0.6 K/UL — SIGNIFICANT CHANGE UP (ref 0–0.9)
MONOCYTES NFR BLD AUTO: 10.3 % — SIGNIFICANT CHANGE UP (ref 2–14)
NEUTROPHILS # BLD AUTO: 3.07 K/UL — SIGNIFICANT CHANGE UP (ref 1.8–7.4)
NEUTROPHILS NFR BLD AUTO: 53 % — SIGNIFICANT CHANGE UP (ref 43–77)
NRBC # BLD: 0 /100 WBCS — SIGNIFICANT CHANGE UP (ref 0–0)
PLATELET # BLD AUTO: 246 K/UL — SIGNIFICANT CHANGE UP (ref 150–400)
POTASSIUM SERPL-MCNC: 3.6 MMOL/L — SIGNIFICANT CHANGE UP (ref 3.5–5.3)
POTASSIUM SERPL-SCNC: 3.6 MMOL/L — SIGNIFICANT CHANGE UP (ref 3.5–5.3)
RBC # BLD: 4.13 M/UL — SIGNIFICANT CHANGE UP (ref 3.8–5.2)
RBC # FLD: 13 % — SIGNIFICANT CHANGE UP (ref 10.3–14.5)
SODIUM SERPL-SCNC: 143 MMOL/L — SIGNIFICANT CHANGE UP (ref 135–145)
SPECIMEN SOURCE: SIGNIFICANT CHANGE UP
WBC # BLD: 5.8 K/UL — SIGNIFICANT CHANGE UP (ref 3.8–10.5)
WBC # FLD AUTO: 5.8 K/UL — SIGNIFICANT CHANGE UP (ref 3.8–10.5)

## 2023-03-19 PROCEDURE — 94640 AIRWAY INHALATION TREATMENT: CPT

## 2023-03-19 PROCEDURE — 99239 HOSP IP/OBS DSCHRG MGMT >30: CPT

## 2023-03-19 PROCEDURE — 87635 SARS-COV-2 COVID-19 AMP PRB: CPT

## 2023-03-19 PROCEDURE — 99285 EMERGENCY DEPT VISIT HI MDM: CPT | Mod: 25

## 2023-03-19 PROCEDURE — 80053 COMPREHEN METABOLIC PANEL: CPT

## 2023-03-19 PROCEDURE — 87086 URINE CULTURE/COLONY COUNT: CPT

## 2023-03-19 PROCEDURE — 93005 ELECTROCARDIOGRAM TRACING: CPT

## 2023-03-19 PROCEDURE — 80048 BASIC METABOLIC PNL TOTAL CA: CPT

## 2023-03-19 PROCEDURE — 83690 ASSAY OF LIPASE: CPT

## 2023-03-19 PROCEDURE — 85610 PROTHROMBIN TIME: CPT

## 2023-03-19 PROCEDURE — 83735 ASSAY OF MAGNESIUM: CPT

## 2023-03-19 PROCEDURE — 74177 CT ABD & PELVIS W/CONTRAST: CPT | Mod: MA

## 2023-03-19 PROCEDURE — 87040 BLOOD CULTURE FOR BACTERIA: CPT

## 2023-03-19 PROCEDURE — 85730 THROMBOPLASTIN TIME PARTIAL: CPT

## 2023-03-19 PROCEDURE — 71045 X-RAY EXAM CHEST 1 VIEW: CPT

## 2023-03-19 PROCEDURE — 96374 THER/PROPH/DIAG INJ IV PUSH: CPT

## 2023-03-19 PROCEDURE — 85025 COMPLETE CBC W/AUTO DIFF WBC: CPT

## 2023-03-19 PROCEDURE — 81001 URINALYSIS AUTO W/SCOPE: CPT

## 2023-03-19 PROCEDURE — 83605 ASSAY OF LACTIC ACID: CPT

## 2023-03-19 PROCEDURE — 36415 COLL VENOUS BLD VENIPUNCTURE: CPT

## 2023-03-19 PROCEDURE — 96375 TX/PRO/DX INJ NEW DRUG ADDON: CPT

## 2023-03-19 PROCEDURE — 86803 HEPATITIS C AB TEST: CPT

## 2023-03-19 RX ORDER — PANTOPRAZOLE SODIUM 20 MG/1
40 TABLET, DELAYED RELEASE ORAL ONCE
Refills: 0 | Status: COMPLETED | OUTPATIENT
Start: 2023-03-19 | End: 2023-03-19

## 2023-03-19 RX ORDER — ACETAMINOPHEN 500 MG
2 TABLET ORAL
Qty: 0 | Refills: 0 | DISCHARGE
Start: 2023-03-19

## 2023-03-19 RX ADMIN — Medication 1 SPRAY(S): at 11:56

## 2023-03-19 RX ADMIN — PIPERACILLIN AND TAZOBACTAM 25 GRAM(S): 4; .5 INJECTION, POWDER, LYOPHILIZED, FOR SOLUTION INTRAVENOUS at 05:50

## 2023-03-19 RX ADMIN — Medication 81 MILLIGRAM(S): at 11:52

## 2023-03-19 RX ADMIN — LOSARTAN POTASSIUM 50 MILLIGRAM(S): 100 TABLET, FILM COATED ORAL at 11:52

## 2023-03-19 RX ADMIN — FAMOTIDINE 20 MILLIGRAM(S): 10 INJECTION INTRAVENOUS at 05:50

## 2023-03-19 RX ADMIN — Medication 250 MILLIGRAM(S): at 05:50

## 2023-03-19 RX ADMIN — EZETIMIBE 10 MILLIGRAM(S): 10 TABLET ORAL at 11:52

## 2023-03-19 RX ADMIN — Medication 2 GRAM(S): at 11:52

## 2023-03-19 RX ADMIN — Medication 50 MILLIGRAM(S): at 05:50

## 2023-03-19 RX ADMIN — MONTELUKAST 10 MILLIGRAM(S): 4 TABLET, CHEWABLE ORAL at 11:52

## 2023-03-19 RX ADMIN — CLOPIDOGREL BISULFATE 75 MILLIGRAM(S): 75 TABLET, FILM COATED ORAL at 11:53

## 2023-03-19 NOTE — DISCHARGE NOTE PROVIDER - ATTENDING DISCHARGE PHYSICAL EXAMINATION:
Vital Signs   T(C): 36.4 (19 Mar 2023 11:26), Max: 36.7 (19 Mar 2023 04:45)  T(F): 97.5 (19 Mar 2023 11:26), Max: 98 (19 Mar 2023 04:45)  HR: 64 (19 Mar 2023 11:26) (64 - 69)  BP: 139/74 (19 Mar 2023 11:26) (114/72 - 139/74)  RR: 19 (19 Mar 2023 11:26) (18 - 20)  SpO2: 93% (19 Mar 2023 11:26) (93% - 94%)  Parameters below as of 19 Mar 2023 11:26  Patient On (Oxygen Delivery Method): room air  General: Elderly female sitting in bed comfortably. No acute distress  HEENT: EOMI. Clear conjunctivae. Moist mucus membrane  Neck: Supple.   Chest: CTA bilaterally - no wheezing, rales or rhonchi.   Heart: Normal S1 & S2 with RRR.   Abdomen: Obese. Soft. Non-tender. + BS  Ext: No pedal edema. No calf tenderness   Neuro: AAO x 3. No focal deficit. No speech disorder.  Skin: Warm and Dry  Psychiatry: Normal mood and affect

## 2023-03-19 NOTE — DISCHARGE NOTE NURSING/CASE MANAGEMENT/SOCIAL WORK - NSDCPEFALRISK_GEN_ALL_CORE
For information on Fall & Injury Prevention, visit: [email or web address] OR  [email or web address] OR  https://www.cdc.gov/steadi/patient.html

## 2023-03-19 NOTE — DISCHARGE NOTE PROVIDER - CARE PROVIDERS DIRECT ADDRESSES
[email or web address]

## 2023-03-19 NOTE — DISCHARGE NOTE PROVIDER - NSDCCPCAREPLAN_GEN_ALL_CORE_FT
PRINCIPAL DISCHARGE DIAGNOSIS  Diagnosis: Sigmoid diverticulitis  Assessment and Plan of Treatment: Continue antibiotics as prescribed.  Follow up with GI in 1 week.

## 2023-03-19 NOTE — DISCHARGE NOTE NURSING/CASE MANAGEMENT/SOCIAL WORK - PATIENT PORTAL LINK FT
You can access the FollowMyHealth Patient Portal offered by [facility] by registering at the following website: [email or web address]. By joining [facility]’s FollowMyHealth portal, you will also be able to view your health information using other applications (apps) compatible with our system.

## 2023-03-19 NOTE — DISCHARGE NOTE PROVIDER - NSDCQMSTAIRS_GEN_ALL_CORE
No Otolaryngologist Procedure Text (F): After obtaining clear surgical margins the patient was sent to otolaryngology for surgical repair.  The patient understands they will receive post-surgical care and follow-up from the referring physician's office.

## 2023-03-19 NOTE — DISCHARGE NOTE PROVIDER - NSDCMRMEDTOKEN_GEN_ALL_CORE_FT
acetaminophen 325 mg oral tablet: 2 tab(s) orally every 6 hours, As needed for pain.   amoxicillin-clavulanate 875 mg-125 mg oral tablet: 1 tab(s) orally every 12 hours   Aspir 81 oral delayed release tablet: 1 tab(s) orally once a day AM  biotin 1000 mcg oral tablet: 1 tab(s) orally once a day AM  Calcium 600+D oral tablet: orally once a day  Night  Co Q-10: 200 milligram(s) orally once a day  Cozaar 50 mg oral tablet: 1 tab(s) orally once a day  Fish Oil 1200 mg oral capsule: orally once a day AM  Flonase 50 mcg/inh nasal spray: 1 spray(s) nasal once a day  Multi Vitamin+: 1 tab(s) orally once a day AM  Pepcid 20 mg oral tablet: 1 tab(s) orally 2 times a day  Plavix 75 mg oral tablet: 1 tab(s) orally once a day AM  Probiotic Formula oral capsule: 1 cap(s) orally once a day  simvastatin 20 mg oral tablet: 1 tab(s) orally once a day (at bedtime)  Singulair 10 mg oral tablet: 1 tab(s) orally once a day AM  Toprol-XL 50 mg oral tablet, extended release: 1 tab(s) orally once (at bedtime)  Vitamin C 500 mg oral tablet: 1 tab(s) orally once a day AM  Zetia 10 mg oral tablet: 1 tab(s) orally once a day pm

## 2023-03-19 NOTE — DISCHARGE NOTE PROVIDER - CARE PROVIDER_API CALL
Lili Wheatley)  Gastroenterology; Internal Medicine  [address], [place] [address]  Phone: [phone]  Fax: [phone]  Established Patient  Follow Up Time: 1 week    Misty Rodgers (DO)  Family Medicine  [address] [place] [address]  Phone: [phone]  Fax: [phone]  Established Patient  Follow Up Time: 1 week

## 2023-03-19 NOTE — DISCHARGE NOTE PROVIDER - HOSPITAL COURSE
Patient was admitted with Sigmoid Diverticulitis. Started on Zosyn (allergic to PCN however tolerated very well). Symptoms started improving. Diet was slowly advanced. She is feeling better and is stable for discharge.

## 2023-03-20 ENCOUNTER — NON-APPOINTMENT (OUTPATIENT)
Age: 68
End: 2023-03-20

## 2023-03-23 ENCOUNTER — APPOINTMENT (OUTPATIENT)
Dept: GASTROENTEROLOGY | Facility: CLINIC | Age: 68
End: 2023-03-23
Payer: MEDICARE

## 2023-03-23 VITALS — BODY MASS INDEX: 29.07 KG/M2 | HEIGHT: 61 IN | WEIGHT: 154 LBS

## 2023-03-23 PROCEDURE — 99214 OFFICE O/P EST MOD 30 MIN: CPT

## 2023-03-23 RX ORDER — LORATADINE 10 MG
17 TABLET,DISINTEGRATING ORAL
Refills: 0 | Status: DISCONTINUED | COMMUNITY
End: 2023-03-23

## 2023-03-23 NOTE — ASSESSMENT
[FreeTextEntry1] : IMPRESSION: \par #  Acute uncomplicated sigmoid diverticulitis - hospitalized 3/18 -3/19/2023 - seen Dr. Bains in 9/2021 for possible elective hemicolectomy - advised not to consider.  Three documented attacks since 2018. \par -  CT scan A/P with IV contrast 3/18/2023:  Sigmoid diverticulosis.  Mild thickening of sigmoid colon compatible with acute uncomplicated diverticulitis. 4 mm RML nodule.  B/l ovarian cysts - right slightly has decreased.  \par -  CT scan fo the A/P with IV contrast on 8/2021:  Colon diverticulosis without diverticuliti. Fatty liver.  b/l ovarian cyst. \par - CT scan of the abd/pelvis 6/17/2021: Sigmoid diverticulitis - b/l ovarian cysts slightly more lager than prior study - consider MRI of pelvis as per radiologist. She has follow up GYN\par - History of abdominal bloating/distention/difficulty moving her bowels \par - History of diverticulitis in Feb 2018 - treated with oral abx\par -  Colonoscopy by Dr. smith on 11/2021:  Diminutive descending colon polyp removed (tubular adenoma).  Mild to moderate sigmoid diverticulosis.  Good prep.  Repeat colonoscopy in 3 years. \par - Colonoscopy in in 4/2018 - two polyps were removed - largest polyp was 1 cm - hyperplastic polyps. Repeat colonoscopy in 4/2021 recommended. \par \par # History of acid reflux - previously on H2 blockers\par -  EGD by Dr. smith on 11/2021;  Nml esophagea s/p distal esophagus bx (neg path).  Mild antral erythema sp A/B bx (neg for HP and IM).  Nml duodenum. \par - EGD in 2014 - mild gastritis and mild duodenitis. \par \par # Family history of digestive cancers\par - Father had cancerous colon polyp in late 70s. Paternal aunt had colon cancer late 60s. \par - Paternal aunt had esophageal cancer \par - Advise previously to consider genetic testing \par \par # CAD s/p PCI to the proximal LAD with a Taxus stent in 2007, PCI to the RI in March of 2012 in the setting of a mild NSTEMI, on aspirin/Plavix, hyperlipidemia, and hypertension\par \par \par \par DISCUSSION/PLAN\par Advised her to finish course of antibiotics for diverticulitis. \par Risks for diverticulitis reviewed including lack of physical activity, low fiber diet, high fat/meat diet, weight gain etc.  Advised high fiber diet, and increased physical activity.\par \par Since she has a history of coronary artery disease, NSTEMI, she has seen surgeon to consider surgical resection - no surgery recommended. \par \par Follow up with Primary regarding 4 mm RML nodule seen on CT scan.

## 2023-03-23 NOTE — HISTORY OF PRESENT ILLNESS
[FreeTextEntry1] : 69 y/o mother of three, with Hx of CAD s/p PCI to the proximal LAD with a Taxus stent in 2007, PCI to the RI in March of 2012 in the setting of a mild NSTEMI, on aspirin/Plavix, hyperlipidemia, hypertension, diverticulitis who presents after recent hospitalization for diverticulitis. \par \par She recalls eating popcorn and pistachios and then the next day had lower abdominal discomfort by the evening went to [facility] - admitted for sigmoid diverticulitis.  She had been on clear liquid diet for two days and then advanced her diet - tolerated her diet.  she was discharged on oral antibiotics, her pain resolved on while on hospital.   No fevers.  Has been having BMS no melena and no hematochezia.  She had been nauseas which resolved. No vomiting. \par \par Drinks on the weekends - no more than glasses.  \par \par All other review of systems are negative.  Denies cardiac symptoms.\par \par Initial visit 4/2021: \par She says she had only one attack of diverticulitis. She recalls on Feb, 2018, while at work she started having lower abdominal pain that progressively worsened, she went to ER and diagnosed with diverticulitis - she was discharged from ER to complete Cipro and Flagyl. She says she had a "terrible" reaction to Flagyl - she felt weak, faint, couldn't walk. She went back to ER because she was feeling weak - was eventually told that perhaps she is allergic to Flagyl. \par \par Since then gets "twinges" in lower abdomen every now and then. \par She had a colonoscopy in 4/2018, and was found to have a 1 cm polyp in the rectum that was removed, 8 mm descending colon polyp removed. There was sigmoid diverticulosis. Both polyps were hyperplastic. She was advised to have a repeat colonoscopy in 3 years. \par \par She says she was tested for celiac disease in the past and it was negative. \par Last couple of months, she has been feeling bloated. \par Abdomen distends and can get hard - she takes chamomile tea which helps with bloating, with the distension and hardness. \par \par Gets nausea when feeling bloated but does not vomit. \par She takes beano and prebiotic and probiotics - she is not sure if it has been helping with her bloating. \par Recently she has been having difficulty moving her bowels, abdominal cramps - she took a laxative and then felt faint. \par \par She has a bowel movement once a day - when she has vegetables shakes she has better bowel movements. \par \par No melena and no hematochezia. No loss of appetite, no abnormal weight loss. \par Takes Pepcid twice a day for years - originally on Zantac - she was started on acid reduces 7 years because at that time she experiencing heartburn. She has not taken herself off of H2 blockers. She says rarely gets heartburn. \par \par 2 months ago she saw her cardiologist and underwent stress test and echo. \par \par On Oct 2014 she had an upper endoscopy and found to have mild gastritis, and mild duodenitis. \par \par Sister has had recurrent diverticulitis. Father did not have colon cancer - only prostate cancer. Paternal aunt had esophageal cancer. \par \par \par Discussion/Summary April 2021: \par Spoke with patient advised about holding Plavix 5 days prior - advised clearance from cardiologist. \par She says she spoke with her sister who told her father had cancerous colon polyp in his late 70s. Paternal aunt also had colon cancer in her late 60s. Another paternal aunt had esophageal cancer.\par \par Advised considering genetic testing and seeing counselor - contact information given. \par \par \par Discussion/Summary June 17, 2021: \par lower abdominal pain =- worried about diverticulitis - took abx - advised to go to ER and cancel upcoming colonoscopy. \par \par CT scan of the abd/pelvis 6/17/2021: Sigmoid diverticulitis. B/l ovarian cysts slightly more lager than prior study - consider MRI of pelvis as per radiologist. \par  \par \par Office visit 6/2021: \par She says she went to ER last Thursday for lower abdominal pain- she was discharged from ER with Cipro/Cleocin for sigmoid diverticulitis. She says pain resolved by Saturday. No fevers, no chills. No loss of appetite, no weight loss. No melena and no hematochezia. \par \par Discussion/Summary 8/2021: \par On Saturday she had lower abdominal discomfort - she started taking Cipro/Flagyl, she is concerned about diverticulitis - she feeling better with pain, but says she is a "little tender to touch"- feels a little nausea. No fevers, no chills. Advised ER.  Will order CT scan of abd/pelvis to r/o Diverticulitis. \par \par \par Discussion/Summary 8/2021:\par Called patient and results of recent CT scan reviewed - no evidence of diverticulitis - she says her initial pain lasted for a day that was documented on prior charting - advised if ongoing pain then to call me - risks of a colonoscopy such as bowel perforation requiring surgery, bleeding, risks of anesthesia etc were reviewed - she says she has not made surgical consultation for elective hemicolectomy - but will in the future.

## 2023-03-31 NOTE — PATIENT PROFILE ADULT - FUNCTIONAL ASSESSMENT - BASIC MOBILITY 5.
Case Management Assessment  Initial Evaluation    Date/Time of Evaluation: 3/31/2023 10:45 AM  Assessment Completed by: Aspen Wilcox RN    If patient is discharged prior to next notation, then this note serves as note for discharge by case management. Patient Name: Sabine Ryan                   YOB: 1964  Diagnosis: Colitis [K52.9]  Hypoxia [R09.02]  Multifocal pneumonia [J18.9]                   Date / Time: 3/30/2023  7:48 PM    Patient Admission Status: Inpatient   Readmission Risk (Low < 19, Mod (19-27), High > 27): Readmission Risk Score: 21.5    Current PCP: Matt Kunz, DO  PCP verified by CM? (P) Yes    Chart Reviewed: Yes      History Provided by: (P) Patient  Patient Orientation: (P) Alert and Oriented, Person, Place, Situation, Self    Patient Cognition: (P) Alert    Hospitalization in the last 30 days (Readmission):  Yes    If yes, Readmission Assessment in  Navigator will be completed.     Advance Directives:      Code Status: Full Code   Patient's Primary Decision Maker is: (P) Legal Next of Kin      Discharge Planning:    Patient lives with: (P) Alone Type of Home: (P) Apartment  Primary Care Giver: (P) Self  Patient Support Systems include: (P) Children   Current Financial resources:    Current community resources:    Current services prior to admission: (P) Durable Medical Equipment            Current DME: (P) Walker, Other (Comment) (toilet riser and transfer bench)            Type of Home Care services:  (P) Nursing Services, OT, PT    ADLS  Prior functional level: (P) Independent in ADLs/IADLs  Current functional level: (P) Assistance with the following:, Housework, Cooking    PT AM-PAC:   /24  OT AM-PAC:   /24    Family can provide assistance at DC: (P) Yes  Would you like Case Management to discuss the discharge plan with any other family members/significant others, and if so, who? (P) Yes  Plans to Return to Present Housing: (P) Yes  Other Identified 4 = No assist / stand by assistance

## 2023-04-04 ENCOUNTER — INPATIENT (INPATIENT)
Facility: HOSPITAL | Age: 68
LOS: 6 days | Discharge: HOME | DRG: 329 | End: 2023-04-11
Attending: SPECIALIST | Admitting: SPECIALIST
Payer: MEDICARE

## 2023-04-04 VITALS
HEART RATE: 70 BPM | DIASTOLIC BLOOD PRESSURE: 83 MMHG | TEMPERATURE: 98 F | WEIGHT: 154.98 LBS | SYSTOLIC BLOOD PRESSURE: 142 MMHG | RESPIRATION RATE: 16 BRPM | HEIGHT: 61 IN | OXYGEN SATURATION: 98 %

## 2023-04-04 DIAGNOSIS — K57.80 DIVERTICULITIS OF INTESTINE, PART UNSPECIFIED, WITH PERFORATION AND ABSCESS WITHOUT BLEEDING: ICD-10-CM

## 2023-04-04 DIAGNOSIS — C53.9 MALIGNANT NEOPLASM OF CERVIX UTERI, UNSPECIFIED: Chronic | ICD-10-CM

## 2023-04-04 LAB
ALBUMIN SERPL ELPH-MCNC: 3.8 G/DL — SIGNIFICANT CHANGE UP (ref 3.3–5)
ALP SERPL-CCNC: 82 U/L — SIGNIFICANT CHANGE UP (ref 40–120)
ALT FLD-CCNC: 30 U/L — SIGNIFICANT CHANGE UP (ref 12–78)
ANION GAP SERPL CALC-SCNC: 4 MMOL/L — LOW (ref 5–17)
APPEARANCE UR: CLEAR — SIGNIFICANT CHANGE UP
AST SERPL-CCNC: 20 U/L — SIGNIFICANT CHANGE UP (ref 15–37)
BASOPHILS # BLD AUTO: 0.02 K/UL — SIGNIFICANT CHANGE UP (ref 0–0.2)
BASOPHILS NFR BLD AUTO: 0.2 % — SIGNIFICANT CHANGE UP (ref 0–2)
BILIRUB SERPL-MCNC: 0.5 MG/DL — SIGNIFICANT CHANGE UP (ref 0.2–1.2)
BILIRUB UR-MCNC: NEGATIVE — SIGNIFICANT CHANGE UP
BUN SERPL-MCNC: 15 MG/DL — SIGNIFICANT CHANGE UP (ref 7–23)
CALCIUM SERPL-MCNC: 9.9 MG/DL — SIGNIFICANT CHANGE UP (ref 8.5–10.1)
CHLORIDE SERPL-SCNC: 106 MMOL/L — SIGNIFICANT CHANGE UP (ref 96–108)
CO2 SERPL-SCNC: 30 MMOL/L — SIGNIFICANT CHANGE UP (ref 22–31)
COLOR SPEC: SIGNIFICANT CHANGE UP
CREAT SERPL-MCNC: 0.63 MG/DL — SIGNIFICANT CHANGE UP (ref 0.5–1.3)
DIFF PNL FLD: ABNORMAL
EGFR: 97 ML/MIN/1.73M2 — SIGNIFICANT CHANGE UP
EOSINOPHIL # BLD AUTO: 0.14 K/UL — SIGNIFICANT CHANGE UP (ref 0–0.5)
EOSINOPHIL NFR BLD AUTO: 1.3 % — SIGNIFICANT CHANGE UP (ref 0–6)
EPI CELLS # UR: SIGNIFICANT CHANGE UP
FLUAV AG NPH QL: SIGNIFICANT CHANGE UP
FLUBV AG NPH QL: SIGNIFICANT CHANGE UP
GLUCOSE SERPL-MCNC: 96 MG/DL — SIGNIFICANT CHANGE UP (ref 70–99)
GLUCOSE UR QL: NEGATIVE — SIGNIFICANT CHANGE UP
HCT VFR BLD CALC: 39.9 % — SIGNIFICANT CHANGE UP (ref 34.5–45)
HGB BLD-MCNC: 13.1 G/DL — SIGNIFICANT CHANGE UP (ref 11.5–15.5)
IMM GRANULOCYTES NFR BLD AUTO: 0.4 % — SIGNIFICANT CHANGE UP (ref 0–0.9)
KETONES UR-MCNC: ABNORMAL
LACTATE SERPL-SCNC: 1.5 MMOL/L — SIGNIFICANT CHANGE UP (ref 0.7–2)
LEUKOCYTE ESTERASE UR-ACNC: NEGATIVE — SIGNIFICANT CHANGE UP
LIDOCAIN IGE QN: 112 U/L — SIGNIFICANT CHANGE UP (ref 73–393)
LYMPHOCYTES # BLD AUTO: 1.33 K/UL — SIGNIFICANT CHANGE UP (ref 1–3.3)
LYMPHOCYTES # BLD AUTO: 12.5 % — LOW (ref 13–44)
MCHC RBC-ENTMCNC: 29.8 PG — SIGNIFICANT CHANGE UP (ref 27–34)
MCHC RBC-ENTMCNC: 32.8 GM/DL — SIGNIFICANT CHANGE UP (ref 32–36)
MCV RBC AUTO: 90.9 FL — SIGNIFICANT CHANGE UP (ref 80–100)
MONOCYTES # BLD AUTO: 0.57 K/UL — SIGNIFICANT CHANGE UP (ref 0–0.9)
MONOCYTES NFR BLD AUTO: 5.3 % — SIGNIFICANT CHANGE UP (ref 2–14)
NEUTROPHILS # BLD AUTO: 8.58 K/UL — HIGH (ref 1.8–7.4)
NEUTROPHILS NFR BLD AUTO: 80.3 % — HIGH (ref 43–77)
NITRITE UR-MCNC: NEGATIVE — SIGNIFICANT CHANGE UP
NRBC # BLD: 0 /100 WBCS — SIGNIFICANT CHANGE UP (ref 0–0)
PH UR: 7 — SIGNIFICANT CHANGE UP (ref 5–8)
PLATELET # BLD AUTO: 294 K/UL — SIGNIFICANT CHANGE UP (ref 150–400)
POTASSIUM SERPL-MCNC: 3.6 MMOL/L — SIGNIFICANT CHANGE UP (ref 3.5–5.3)
POTASSIUM SERPL-SCNC: 3.6 MMOL/L — SIGNIFICANT CHANGE UP (ref 3.5–5.3)
PROT SERPL-MCNC: 7.2 G/DL — SIGNIFICANT CHANGE UP (ref 6–8.3)
PROT UR-MCNC: NEGATIVE — SIGNIFICANT CHANGE UP
RBC # BLD: 4.39 M/UL — SIGNIFICANT CHANGE UP (ref 3.8–5.2)
RBC # FLD: 12.9 % — SIGNIFICANT CHANGE UP (ref 10.3–14.5)
RBC CASTS # UR COMP ASSIST: SIGNIFICANT CHANGE UP /HPF (ref 0–4)
RSV RNA NPH QL NAA+NON-PROBE: SIGNIFICANT CHANGE UP
SARS-COV-2 RNA SPEC QL NAA+PROBE: SIGNIFICANT CHANGE UP
SODIUM SERPL-SCNC: 140 MMOL/L — SIGNIFICANT CHANGE UP (ref 135–145)
SP GR SPEC: 1 — LOW (ref 1.01–1.02)
UROBILINOGEN FLD QL: NEGATIVE — SIGNIFICANT CHANGE UP
WBC # BLD: 10.68 K/UL — HIGH (ref 3.8–10.5)
WBC # FLD AUTO: 10.68 K/UL — HIGH (ref 3.8–10.5)
WBC UR QL: SIGNIFICANT CHANGE UP

## 2023-04-04 PROCEDURE — 99285 EMERGENCY DEPT VISIT HI MDM: CPT

## 2023-04-04 PROCEDURE — 74177 CT ABD & PELVIS W/CONTRAST: CPT | Mod: 26,MA

## 2023-04-04 PROCEDURE — 93010 ELECTROCARDIOGRAM REPORT: CPT

## 2023-04-04 PROCEDURE — 99223 1ST HOSP IP/OBS HIGH 75: CPT

## 2023-04-04 RX ORDER — MORPHINE SULFATE 50 MG/1
2 CAPSULE, EXTENDED RELEASE ORAL ONCE
Refills: 0 | Status: DISCONTINUED | OUTPATIENT
Start: 2023-04-04 | End: 2023-04-04

## 2023-04-04 RX ORDER — LOSARTAN POTASSIUM 100 MG/1
50 TABLET, FILM COATED ORAL DAILY
Refills: 0 | Status: DISCONTINUED | OUTPATIENT
Start: 2023-04-04 | End: 2023-04-08

## 2023-04-04 RX ORDER — ERTAPENEM SODIUM 1 G/1
1000 INJECTION, POWDER, LYOPHILIZED, FOR SOLUTION INTRAMUSCULAR; INTRAVENOUS EVERY 24 HOURS
Refills: 0 | Status: DISCONTINUED | OUTPATIENT
Start: 2023-04-05 | End: 2023-04-05

## 2023-04-04 RX ORDER — ASPIRIN/CALCIUM CARB/MAGNESIUM 324 MG
81 TABLET ORAL DAILY
Refills: 0 | Status: DISCONTINUED | OUTPATIENT
Start: 2023-04-04 | End: 2023-04-07

## 2023-04-04 RX ORDER — ERTAPENEM SODIUM 1 G/1
1000 INJECTION, POWDER, LYOPHILIZED, FOR SOLUTION INTRAMUSCULAR; INTRAVENOUS ONCE
Refills: 0 | Status: COMPLETED | OUTPATIENT
Start: 2023-04-04 | End: 2023-04-04

## 2023-04-04 RX ORDER — MORPHINE SULFATE 50 MG/1
2 CAPSULE, EXTENDED RELEASE ORAL EVERY 4 HOURS
Refills: 0 | Status: DISCONTINUED | OUTPATIENT
Start: 2023-04-04 | End: 2023-04-08

## 2023-04-04 RX ORDER — PANTOPRAZOLE SODIUM 20 MG/1
40 TABLET, DELAYED RELEASE ORAL ONCE
Refills: 0 | Status: COMPLETED | OUTPATIENT
Start: 2023-04-05 | End: 2023-04-05

## 2023-04-04 RX ORDER — MORPHINE SULFATE 50 MG/1
4 CAPSULE, EXTENDED RELEASE ORAL EVERY 4 HOURS
Refills: 0 | Status: DISCONTINUED | OUTPATIENT
Start: 2023-04-04 | End: 2023-04-08

## 2023-04-04 RX ORDER — ONDANSETRON 8 MG/1
4 TABLET, FILM COATED ORAL ONCE
Refills: 0 | Status: COMPLETED | OUTPATIENT
Start: 2023-04-04 | End: 2023-04-04

## 2023-04-04 RX ORDER — ERTAPENEM SODIUM 1 G/1
1000 INJECTION, POWDER, LYOPHILIZED, FOR SOLUTION INTRAMUSCULAR; INTRAVENOUS ONCE
Refills: 0 | Status: DISCONTINUED | OUTPATIENT
Start: 2023-04-04 | End: 2023-04-04

## 2023-04-04 RX ORDER — ERTAPENEM SODIUM 1 G/1
INJECTION, POWDER, LYOPHILIZED, FOR SOLUTION INTRAMUSCULAR; INTRAVENOUS
Refills: 0 | Status: DISCONTINUED | OUTPATIENT
Start: 2023-04-04 | End: 2023-04-05

## 2023-04-04 RX ORDER — ACETAMINOPHEN 500 MG
650 TABLET ORAL EVERY 6 HOURS
Refills: 0 | Status: DISCONTINUED | OUTPATIENT
Start: 2023-04-04 | End: 2023-04-05

## 2023-04-04 RX ORDER — MONTELUKAST 4 MG/1
10 TABLET, CHEWABLE ORAL DAILY
Refills: 0 | Status: DISCONTINUED | OUTPATIENT
Start: 2023-04-05 | End: 2023-04-08

## 2023-04-04 RX ORDER — SODIUM CHLORIDE 9 MG/ML
1000 INJECTION, SOLUTION INTRAVENOUS
Refills: 0 | Status: DISCONTINUED | OUTPATIENT
Start: 2023-04-04 | End: 2023-04-05

## 2023-04-04 RX ORDER — SODIUM CHLORIDE 9 MG/ML
1000 INJECTION INTRAMUSCULAR; INTRAVENOUS; SUBCUTANEOUS
Refills: 0 | Status: COMPLETED | OUTPATIENT
Start: 2023-04-04 | End: 2023-04-04

## 2023-04-04 RX ORDER — SIMVASTATIN 20 MG/1
20 TABLET, FILM COATED ORAL AT BEDTIME
Refills: 0 | Status: DISCONTINUED | OUTPATIENT
Start: 2023-04-04 | End: 2023-04-08

## 2023-04-04 RX ORDER — MORPHINE SULFATE 50 MG/1
4 CAPSULE, EXTENDED RELEASE ORAL ONCE
Refills: 0 | Status: DISCONTINUED | OUTPATIENT
Start: 2023-04-04 | End: 2023-04-04

## 2023-04-04 RX ORDER — ENOXAPARIN SODIUM 100 MG/ML
40 INJECTION SUBCUTANEOUS EVERY 24 HOURS
Refills: 0 | Status: DISCONTINUED | OUTPATIENT
Start: 2023-04-04 | End: 2023-04-07

## 2023-04-04 RX ORDER — METOPROLOL TARTRATE 50 MG
50 TABLET ORAL DAILY
Refills: 0 | Status: DISCONTINUED | OUTPATIENT
Start: 2023-04-04 | End: 2023-04-08

## 2023-04-04 RX ADMIN — MORPHINE SULFATE 2 MILLIGRAM(S): 50 CAPSULE, EXTENDED RELEASE ORAL at 16:26

## 2023-04-04 RX ADMIN — ERTAPENEM SODIUM 1000 MILLIGRAM(S): 1 INJECTION, POWDER, LYOPHILIZED, FOR SOLUTION INTRAMUSCULAR; INTRAVENOUS at 19:09

## 2023-04-04 RX ADMIN — MORPHINE SULFATE 4 MILLIGRAM(S): 50 CAPSULE, EXTENDED RELEASE ORAL at 18:02

## 2023-04-04 RX ADMIN — ONDANSETRON 4 MILLIGRAM(S): 8 TABLET, FILM COATED ORAL at 16:26

## 2023-04-04 RX ADMIN — SODIUM CHLORIDE 1000 MILLILITER(S): 9 INJECTION INTRAMUSCULAR; INTRAVENOUS; SUBCUTANEOUS at 18:49

## 2023-04-04 RX ADMIN — MORPHINE SULFATE 2 MILLIGRAM(S): 50 CAPSULE, EXTENDED RELEASE ORAL at 16:40

## 2023-04-04 RX ADMIN — SODIUM CHLORIDE 100 MILLILITER(S): 9 INJECTION, SOLUTION INTRAVENOUS at 19:12

## 2023-04-04 RX ADMIN — MORPHINE SULFATE 4 MILLIGRAM(S): 50 CAPSULE, EXTENDED RELEASE ORAL at 17:30

## 2023-04-04 RX ADMIN — ONDANSETRON 4 MILLIGRAM(S): 8 TABLET, FILM COATED ORAL at 19:00

## 2023-04-04 RX ADMIN — SIMVASTATIN 20 MILLIGRAM(S): 20 TABLET, FILM COATED ORAL at 22:45

## 2023-04-04 RX ADMIN — SODIUM CHLORIDE 1000 MILLILITER(S): 9 INJECTION INTRAMUSCULAR; INTRAVENOUS; SUBCUTANEOUS at 16:26

## 2023-04-04 RX ADMIN — SODIUM CHLORIDE 100 MILLILITER(S): 9 INJECTION, SOLUTION INTRAVENOUS at 22:45

## 2023-04-04 NOTE — ED ADULT NURSE NOTE - OBJECTIVE STATEMENT
Patient comes in with complaints of lower abdominal pain since 12pm today. Patient reports she was seen here three weeks ago with Diverticulitis, reports "it feels like it is back." Patient denies fevers, chills, N/V, diarrhea or constipation, denies blood in the stool. LBM this morning "normal" for her. Patient denies urinary burning urgency frequency.

## 2023-04-04 NOTE — H&P ADULT - ASSESSMENT
Pt is 67yo F w/ PMHx significant for CAD s/p stent x2 on ASA and plavix (last took plavix yesterday) and multiple episodes of diverticulitis x 5 all treated medically, presenting now with another episode of acute diverticulitis with imaging showing evidence of possible microperforation.     Plan:  -Admit to surgery  -Consult to cardiology for pre-operative risk stratification  -Hold plavix   -IV ABx with ertapenem in the setting of PCN allergy  -NPO, IVF  -Pain regimen  -AM labs

## 2023-04-04 NOTE — H&P ADULT - NSHPPHYSICALEXAM_GEN_ALL_CORE
Gen: NAD, WDWN  HEENT: sclerae non-injected, non-icteric, neck supple  Pulm: NWOB  CVS: RR  Abd: Soft, non-distended. Tender to palpation in RLQ > LLQ, rebound, no guarding. No organomegaly.   Ext: WWP  Neuro: A&Ox3

## 2023-04-04 NOTE — ED PROVIDER NOTE - OBJECTIVE STATEMENT
68-year-old female with a history of hypertension, high cholesterol, CAD status post stents, diverticulitis, with most recent infection 3 weeks ago presents with lower abdominal pain times today.  Patient states had diverticulitis 3 weeks ago, was admitted to the hospital and was discharged 15 days ago.  Patient was on Augmentin, and tolerated well.  Patient states her symptoms had resolved prior to today.  No acute fever or chills.  No nausea or vomiting.  No acute diarrhea.  No neck or back pain.  No numbness/tingling/focal weakness.  No cough/URI.  No dysuria/hematuria.  No aggravating or alleviating factors otherwise noted.  No other acute complaints.  No recent COVID infection, previously vaccinated.

## 2023-04-04 NOTE — ED PROVIDER NOTE - PROGRESS NOTE DETAILS
Discussed with patient risk benefits alternatives of doing another CT, patient agrees with CT at this time Discussed with surgery PA, will see patient. Patient seen by surgery PA, accepts admission to Dr. Cardenas. The surgery team will write for the antibiotics

## 2023-04-04 NOTE — PATIENT PROFILE ADULT - FALL HARM RISK - RISK INTERVENTIONS
Assistance OOB with selected safe patient handling equipment/Assistance with ambulation/Communicate Fall Risk and Risk Factors to all staff, patient, and family/Discuss with provider need for PT consult/Monitor gait and stability/Reinforce activity limits and safety measures with patient and family/Visual Cue: Yellow wristband/Bed in lowest position, wheels locked, appropriate side rails in place/Call bell, personal items and telephone in reach/Instruct patient to call for assistance before getting out of bed or chair/Non-slip footwear when patient is out of bed/[place] to call system/Physically safe environment - no spills, clutter or unnecessary equipment/Purposeful Proactive Rounding/Room/bathroom lighting operational, light cord in reach

## 2023-04-04 NOTE — ED ADULT TRIAGE NOTE - CHIEF COMPLAINT QUOTE
Patient walked in with c/o lower abdominal pain since 12pm today. Patient reports she was seen here three weeks ago with Diverticulitis, reports "it feels like it is back." Patient denies fevers, chills, N/V, diarrhea or constipation, denies blood in the stool. LBM this morning "normal" for her. Patient denies urinary burning urgency frequency.

## 2023-04-04 NOTE — H&P ADULT - NSHPLABSRESULTS_GEN_ALL_CORE
13.1   10.68 )-----------( 294      ( 04 Apr 2023 16:25 )             39.9   04-04    140  |  106  |  15  ----------------------------<  96  3.6   |  30  |  0.63    Ca    9.9      04 Apr 2023 16:25    TPro  7.2  /  Alb  3.8  /  TBili  0.5  /  DBili  x   /  AST  20  /  ALT  30  /  AlkPhos  82  04-04    Imaging:   CTa/p   IMPRESSION:  Worsening sigmoid diverticulitis with probable developing intramural   abscess and possible microperforation.

## 2023-04-04 NOTE — ED PROVIDER NOTE - DIFFERENTIAL DIAGNOSIS
Rule out acute diverticulitis, colitis, other acute pathology, ovarian cyst/torsion Differential Diagnosis

## 2023-04-04 NOTE — H&P ADULT - HISTORY OF PRESENT ILLNESS
Pt is a 67yo F w/ PMHx of CAD s/p stent x2 in 2007 & 2012  ASA and plavix (last took plavix yesterday) , and 5 episodes of diverticulitis in the past all treated with IV antibiotics. She is presenting to the ED with sudden onset abdominal pain, she reports that the pain is similar to past episodes in character except reports that this time the pain came on more suddenly. In the past she would have mild pain that  would progressively gets worse but this time it was bad very quickly. She reports taking tylenol at home that gave little relief. She denies fevers, chills, nausea or vomiting.

## 2023-04-04 NOTE — ED PROVIDER NOTE - CLINICAL SUMMARY MEDICAL DECISION MAKING FREE TEXT BOX
Lower abdominal pain for 1 day with no acute fever.  Status post recent diverticulitis which had resolved.  Will check labs, repeat CT, US, UA, IV fluids, reeval

## 2023-04-05 DIAGNOSIS — K57.92 DIVERTICULITIS OF INTESTINE, PART UNSPECIFIED, WITHOUT PERFORATION OR ABSCESS WITHOUT BLEEDING: ICD-10-CM

## 2023-04-05 LAB
ANION GAP SERPL CALC-SCNC: 5 MMOL/L — SIGNIFICANT CHANGE UP (ref 5–17)
BASOPHILS # BLD AUTO: 0.02 K/UL — SIGNIFICANT CHANGE UP (ref 0–0.2)
BASOPHILS NFR BLD AUTO: 0.2 % — SIGNIFICANT CHANGE UP (ref 0–2)
BUN SERPL-MCNC: 8 MG/DL — SIGNIFICANT CHANGE UP (ref 7–23)
CALCIUM SERPL-MCNC: 8.6 MG/DL — SIGNIFICANT CHANGE UP (ref 8.5–10.1)
CHLORIDE SERPL-SCNC: 106 MMOL/L — SIGNIFICANT CHANGE UP (ref 96–108)
CO2 SERPL-SCNC: 26 MMOL/L — SIGNIFICANT CHANGE UP (ref 22–31)
CREAT SERPL-MCNC: 0.56 MG/DL — SIGNIFICANT CHANGE UP (ref 0.5–1.3)
EGFR: 99 ML/MIN/1.73M2 — SIGNIFICANT CHANGE UP
EOSINOPHIL # BLD AUTO: 0.01 K/UL — SIGNIFICANT CHANGE UP (ref 0–0.5)
EOSINOPHIL NFR BLD AUTO: 0.1 % — SIGNIFICANT CHANGE UP (ref 0–6)
GLUCOSE SERPL-MCNC: 130 MG/DL — HIGH (ref 70–99)
HCT VFR BLD CALC: 34.1 % — LOW (ref 34.5–45)
HGB BLD-MCNC: 11.5 G/DL — SIGNIFICANT CHANGE UP (ref 11.5–15.5)
IMM GRANULOCYTES NFR BLD AUTO: 0.9 % — SIGNIFICANT CHANGE UP (ref 0–0.9)
INR BLD: 1.3 RATIO — HIGH (ref 0.88–1.16)
LYMPHOCYTES # BLD AUTO: 1 K/UL — SIGNIFICANT CHANGE UP (ref 1–3.3)
LYMPHOCYTES # BLD AUTO: 9.2 % — LOW (ref 13–44)
MCHC RBC-ENTMCNC: 29.9 PG — SIGNIFICANT CHANGE UP (ref 27–34)
MCHC RBC-ENTMCNC: 33.7 GM/DL — SIGNIFICANT CHANGE UP (ref 32–36)
MCV RBC AUTO: 88.8 FL — SIGNIFICANT CHANGE UP (ref 80–100)
MONOCYTES # BLD AUTO: 0.57 K/UL — SIGNIFICANT CHANGE UP (ref 0–0.9)
MONOCYTES NFR BLD AUTO: 5.3 % — SIGNIFICANT CHANGE UP (ref 2–14)
NEUTROPHILS # BLD AUTO: 9.12 K/UL — HIGH (ref 1.8–7.4)
NEUTROPHILS NFR BLD AUTO: 84.3 % — HIGH (ref 43–77)
NRBC # BLD: 0 /100 WBCS — SIGNIFICANT CHANGE UP (ref 0–0)
PLATELET # BLD AUTO: 243 K/UL — SIGNIFICANT CHANGE UP (ref 150–400)
POTASSIUM SERPL-MCNC: 3.2 MMOL/L — LOW (ref 3.5–5.3)
POTASSIUM SERPL-SCNC: 3.2 MMOL/L — LOW (ref 3.5–5.3)
PROTHROM AB SERPL-ACNC: 15.2 SEC — HIGH (ref 10.5–13.4)
RBC # BLD: 3.84 M/UL — SIGNIFICANT CHANGE UP (ref 3.8–5.2)
RBC # FLD: 12.7 % — SIGNIFICANT CHANGE UP (ref 10.3–14.5)
SODIUM SERPL-SCNC: 137 MMOL/L — SIGNIFICANT CHANGE UP (ref 135–145)
WBC # BLD: 10.82 K/UL — HIGH (ref 3.8–10.5)
WBC # FLD AUTO: 10.82 K/UL — HIGH (ref 3.8–10.5)

## 2023-04-05 PROCEDURE — 99223 1ST HOSP IP/OBS HIGH 75: CPT

## 2023-04-05 PROCEDURE — 99233 SBSQ HOSP IP/OBS HIGH 50: CPT

## 2023-04-05 RX ORDER — PIPERACILLIN AND TAZOBACTAM 4; .5 G/20ML; G/20ML
3.38 INJECTION, POWDER, LYOPHILIZED, FOR SOLUTION INTRAVENOUS ONCE
Refills: 0 | Status: COMPLETED | OUTPATIENT
Start: 2023-04-06 | End: 2023-04-06

## 2023-04-05 RX ORDER — POTASSIUM CHLORIDE 20 MEQ
10 PACKET (EA) ORAL
Refills: 0 | Status: COMPLETED | OUTPATIENT
Start: 2023-04-05 | End: 2023-04-05

## 2023-04-05 RX ORDER — ACETAMINOPHEN 500 MG
1000 TABLET ORAL ONCE
Refills: 0 | Status: COMPLETED | OUTPATIENT
Start: 2023-04-05 | End: 2023-04-05

## 2023-04-05 RX ORDER — SODIUM CHLORIDE 9 MG/ML
1000 INJECTION, SOLUTION INTRAVENOUS
Refills: 0 | Status: DISCONTINUED | OUTPATIENT
Start: 2023-04-05 | End: 2023-04-08

## 2023-04-05 RX ORDER — PIPERACILLIN AND TAZOBACTAM 4; .5 G/20ML; G/20ML
3.38 INJECTION, POWDER, LYOPHILIZED, FOR SOLUTION INTRAVENOUS ONCE
Refills: 0 | Status: COMPLETED | OUTPATIENT
Start: 2023-04-05 | End: 2023-04-05

## 2023-04-05 RX ORDER — FLUTICASONE PROPIONATE 50 MCG
1 SPRAY, SUSPENSION NASAL
Refills: 0 | Status: DISCONTINUED | OUTPATIENT
Start: 2023-04-05 | End: 2023-04-08

## 2023-04-05 RX ORDER — ACETAMINOPHEN 500 MG
1000 TABLET ORAL ONCE
Refills: 0 | Status: COMPLETED | OUTPATIENT
Start: 2023-04-05 | End: 2023-04-06

## 2023-04-05 RX ORDER — ONDANSETRON 8 MG/1
4 TABLET, FILM COATED ORAL EVERY 6 HOURS
Refills: 0 | Status: DISCONTINUED | OUTPATIENT
Start: 2023-04-05 | End: 2023-04-08

## 2023-04-05 RX ORDER — PIPERACILLIN AND TAZOBACTAM 4; .5 G/20ML; G/20ML
3.38 INJECTION, POWDER, LYOPHILIZED, FOR SOLUTION INTRAVENOUS EVERY 8 HOURS
Refills: 0 | Status: DISCONTINUED | OUTPATIENT
Start: 2023-04-06 | End: 2023-04-08

## 2023-04-05 RX ORDER — LIDOCAINE HCL 20 MG/ML
5 VIAL (ML) INJECTION ONCE
Refills: 0 | Status: DISCONTINUED | OUTPATIENT
Start: 2023-04-05 | End: 2023-04-05

## 2023-04-05 RX ADMIN — Medication 100 MILLIEQUIVALENT(S): at 12:08

## 2023-04-05 RX ADMIN — PIPERACILLIN AND TAZOBACTAM 200 GRAM(S): 4; .5 INJECTION, POWDER, LYOPHILIZED, FOR SOLUTION INTRAVENOUS at 17:19

## 2023-04-05 RX ADMIN — SODIUM CHLORIDE 100 MILLILITER(S): 9 INJECTION, SOLUTION INTRAVENOUS at 05:34

## 2023-04-05 RX ADMIN — SIMVASTATIN 20 MILLIGRAM(S): 20 TABLET, FILM COATED ORAL at 22:07

## 2023-04-05 RX ADMIN — ONDANSETRON 4 MILLIGRAM(S): 8 TABLET, FILM COATED ORAL at 02:03

## 2023-04-05 RX ADMIN — SODIUM CHLORIDE 75 MILLILITER(S): 9 INJECTION, SOLUTION INTRAVENOUS at 16:07

## 2023-04-05 RX ADMIN — Medication 650 MILLIGRAM(S): at 06:00

## 2023-04-05 RX ADMIN — Medication 1 SPRAY(S): at 06:23

## 2023-04-05 RX ADMIN — Medication 1000 MILLIGRAM(S): at 12:00

## 2023-04-05 RX ADMIN — ENOXAPARIN SODIUM 40 MILLIGRAM(S): 100 INJECTION SUBCUTANEOUS at 05:30

## 2023-04-05 RX ADMIN — Medication 81 MILLIGRAM(S): at 11:43

## 2023-04-05 RX ADMIN — Medication 100 MILLIEQUIVALENT(S): at 14:15

## 2023-04-05 RX ADMIN — Medication 400 MILLIGRAM(S): at 17:19

## 2023-04-05 RX ADMIN — Medication 100 MILLIEQUIVALENT(S): at 14:16

## 2023-04-05 RX ADMIN — Medication 50 MILLIGRAM(S): at 05:30

## 2023-04-05 RX ADMIN — PIPERACILLIN AND TAZOBACTAM 25 GRAM(S): 4; .5 INJECTION, POWDER, LYOPHILIZED, FOR SOLUTION INTRAVENOUS at 22:07

## 2023-04-05 RX ADMIN — PANTOPRAZOLE SODIUM 40 MILLIGRAM(S): 20 TABLET, DELAYED RELEASE ORAL at 05:30

## 2023-04-05 RX ADMIN — Medication 650 MILLIGRAM(S): at 05:30

## 2023-04-05 RX ADMIN — LOSARTAN POTASSIUM 50 MILLIGRAM(S): 100 TABLET, FILM COATED ORAL at 05:30

## 2023-04-05 RX ADMIN — Medication 400 MILLIGRAM(S): at 11:44

## 2023-04-05 RX ADMIN — SODIUM CHLORIDE 75 MILLILITER(S): 9 INJECTION, SOLUTION INTRAVENOUS at 22:07

## 2023-04-05 RX ADMIN — MONTELUKAST 10 MILLIGRAM(S): 4 TABLET, CHEWABLE ORAL at 11:43

## 2023-04-05 NOTE — CARE COORDINATION ASSESSMENT. - REFERRED BY
CM consult noted for stairs in home.  Pt states she has a few stairs into the home and has no issues, she also states she has an upstairs but rarely uses those stairs./nurse

## 2023-04-05 NOTE — CARE COORDINATION ASSESSMENT. - NSCAREPROVIDERS_GEN_ALL_CORE_FT
CARE PROVIDERS:  Accepting Physician: Deshawn Cardenas  Access Services: Paras Cazares  Admitting: Deshawn Cardenas  Attending: Deshawn Cardenas  Case Management: Gume Lopez  Consultant: Benito Russo  Consultant: Wood Bedoya  Consultant: Jacquelyn Lowery  Consultant: Franca Waldron  Consultant: Luis Hughes  ED Attending: Jalen Jackson  ED Nurse: Courtney Butler  Emergency Medicine: Jalen Jackson  Nurse: Vanessa Moe  Nurse: Romana Anguiano  Ordered: Doctor, Unknown  Ordered: ADM, User  Outpatient Provider: Nahun Martinez  Override: Lucy Mills  Override: Radha Penaloza  Override: Lynn Huston  PCA/Nursing Assistant: Ja Hagan  Primary Team: Kalpana Paiz  Primary Team: Jhony Chapman  Primary Team: Dante Ho  Registered Dietitian: Fide [place]

## 2023-04-05 NOTE — CONSULT NOTE ADULT - ASSESSMENT
69yo F w/ PMHx of CAD s/p stent x2 in 2007 & 2012  ASA and plavix (last took plavix yesterday) , and 5 episodes of diverticulitis in the past all treated with IV antibiotics. Presented to the ED with sudden onset abdominal pain, she reports that the pain is similar to past episodes in character except reports that this time the pain came on more suddenly. In the past she would have mild pain that  would progressively gets worse but this time it was bad very quickly. She reports taking tylenol at home that gave little relief. She denies fevers, chills, nausea or vomiting.  Found to have diverticulitis with micro perf and start on abx with plan for surgery.    RECOMMENDATIONS  -reviewed with pt and distant PCN rash and has tolerated Zosyn and even recently no issue with augmentin  -changed abx to Zosyn  -agree with surgical plan  -recs to follow    Thank you for consulting us and involving us in the management of this most interesting and challenging case.  We will follow along in the care of this patient. Please call us at [phone] or text me directly on my cell# at [phone] with any concerns.

## 2023-04-05 NOTE — CARE COORDINATION ASSESSMENT. - CURRENT STRESSORS OF PATIENT
emotional support given, pt states she is scheduled for laparoscopic surgery on Monday/hospitalization

## 2023-04-05 NOTE — CONSULT NOTE ADULT - ASSESSMENT
68yoF w/ PMHx significant for CAD s/p stent x2 on ASA and plavix (last took plavix 4/3) and multiple episodes of diverticulitis x 5 all treated medically, presenting now with another episode of acute diverticulitis with imaging showing evidence of possible microperforation, plan for possible surgical intervention  This morning she is feeling better.  She denies CP, SOB, palpitations, recent syncope.  Prior to this she is very active, exercises 3x/week - walks for about 20-30min.      ECG shows NSR with L axis  TTE 02/2021 EF 65%, mild-mod AI  Nuc stress test 02/2021 normal myocardial perfusion  There are no cardiac complaints  VSS  Pt has no active ischemia, decompensated heart failure, unstable arrhythmia, or severe stenotic valvular disease.  Pt has no modifiable active cardiac risk factors and in the setting of intermediate risk procedure, pt is optimized as best as possible from cardiovascular standpoint to proceed with planned procedure with routine hemodynamic monitoring.     CAD:  no active ischemia  c/w ASA 81mg qd  c/w statin    HTN:  c/w metoprolol and losartan    - Monitor and replete lytes, keep K>4 and Mg >2  - Further cardiac workup will depend on clinical course.   - All other workup per primary team  - Thank you for this consult!

## 2023-04-05 NOTE — CONSULT NOTE ADULT - SUBJECTIVE AND OBJECTIVE BOX
[facility] DIVISION of INFECTIOUS DISEASE  Elder Mijares MD PhD, Michelle Hyatt MD, Ashley Mcnally MD, Celsa Mariano MD, Johny Washburn MD  and providing coverage with Cristine Rojas MD  Providing Infectious Disease Consultations at [facility], [facility], [place], [place], [facility], [facility]'s    Office# [phone] to schedule follow up appointments  Answering Service for urgent calls or New Consults [phone]  Cell# to text for urgent issues Elder Mijares [phone]     HPI:  Pt is a 67yo F w/ PMHx of CAD s/p stent x2 in  &   ASA and plavix (last took plavix yesterday) , and 5 episodes of diverticulitis in the past all treated with IV antibiotics. Presented to the ED with sudden onset abdominal pain, she reports that the pain is similar to past episodes in character except reports that this time the pain came on more suddenly. In the past she would have mild pain that  would progressively gets worse but this time it was bad very quickly. She reports taking tylenol at home that gave little relief. She denies fevers, chills, nausea or vomiting.  Found to have diverticulitis with micro perf and start on abx with plan for surgery.      PAST MEDICAL & SURGICAL HISTORY:  Cervical cancer  with leep procedure, denies chemo/ radiation      CAD (coronary artery disease)      S/P coronary artery stent placement   in [place] and  in [place]      Hypercholesteremia      Hypertension      Diverticulitis      Torn ligament  right 3rd and 4th metatarsal      GERD (gastroesophageal reflux disease)      Seasonal allergies      Allergy  PCN      Status post bunionectomy  right with hardware      S/P angioplasty with stent   in [place]  and  in [place]      Status post       Cervical cancer  LEEP          Antimicrobials  ertapenem  IVPB      ertapenem  IVPB 1000 milliGRAM(s) IV Intermittent every 24 hours      Immunological      Other  acetaminophen   IVPB .. 1000 milliGRAM(s) IV Intermittent once  acetaminophen   IVPB .. 1000 milliGRAM(s) IV Intermittent once PRN  aspirin enteric coated 81 milliGRAM(s) Oral daily  enoxaparin Injectable 40 milliGRAM(s) SubCutaneous every 24 hours  fluticasone propionate 50 MICROgram(s)/spray Nasal Spray 1 Spray(s) Both Nostrils <User Schedule>  lactated ringers. 1000 milliLiter(s) IV Continuous <Continuous>  losartan 50 milliGRAM(s) Oral daily  metoprolol succinate ER 50 milliGRAM(s) Oral daily  montelukast 10 milliGRAM(s) Oral daily  morphine  - Injectable 2 milliGRAM(s) IV Push every 4 hours PRN  morphine  - Injectable 4 milliGRAM(s) IV Push every 4 hours PRN  ondansetron Injectable 4 milliGRAM(s) IV Push every 6 hours PRN  simvastatin 20 milliGRAM(s) Oral at bedtime      Allergies    codeine (Vomiting)  Flagyl (Vomiting; Nausea)  penicillin (Rash)    Intolerances        SOCIAL HISTORY:  Social History:      FAMILY HISTORY:  Family history of heart disease (Father)    Family history of prostate cancer in father (Father)        ROS:    EYES:  Negative  blurry vision or double vision  GASTROINTESTINAL:  Negative for nausea, vomiting, diarrhea  -otherwise negative except for subjective    Vital Signs Last 24 Hrs  T(C): 36.7 (2023 12:46), Max: 37.7 (2023 22:31)  T(F): 98 (2023 12:46), Max: 99.9 (2023 22:31)  HR: 74 (2023 12:46) (74 - 80)  BP: 129/70 (2023 12:46) (94/55 - 129/70)  BP(mean): --  RR: 18 (2023 12:46) (16 - 18)  SpO2: 95% (2023 12:46) (92% - 95%)    Parameters below as of 2023 12:46  Patient On (Oxygen Delivery Method): room air        PE:  In no distress  HEENT:  NC, PERRL, sclerae anicteric, conjunctivae clear, EOMI.  Sinuses nontender, no nasal exudate.  No buccal or pharyngeal lesions, erythema or exudate  Neck:  Supple, no adenopathy  Lungs:  No accessory muscle use, bilaterally clear to auscultation  Cor:  distant  Abd:  Symmetric, normoactive BS.  Soft, lower abd is tender, no masses, guarding or rebound.  Liver and spleen not enlarged  Extrem:  No cyanosis or edema  Skin:  No rashes.  Neuro: grossly intact  Musc: moving all limbs freely, no focal deficits        LABS:                        11.5   10.82 )-----------( 243      ( 2023 07:02 )             34.1       WBC Count: 10.82 K/uL (23 @ 07:02)  WBC Count: 10.68 K/uL (23 @ 16:25)          137  |  106  |  8   ----------------------------<  130<H>  3.2<L>   |  26  |  0.56    Ca    8.6      2023 07:02    TPro  7.2  /  Alb  3.8  /  TBili  0.5  /  DBili  x   /  AST  20  /  ALT  30  /  AlkPhos  82        Creatinine, Serum: 0.56 mg/dL (23 @ 07:02)  Creatinine, Serum: 0.63 mg/dL (23 @ 16:25)      Urinalysis Basic - ( 2023 17:05 )    Color: Pale Yellow / Appearance: Clear / S.005 / pH: x  Gluc: x / Ketone: Trace  / Bili: Negative / Urobili: Negative   Blood: x / Protein: Negative / Nitrite: Negative   Leuk Esterase: Negative / RBC: 0-2 /HPF / WBC 0-2   Sq Epi: x / Non Sq Epi: Occasional / Bacteria: x        MICROBIOLOGY:      RADIOLOGY & ADDITIONAL STUDIES:    --< from: CT Abdomen and Pelvis w/ IV Cont (23 @ 16:36) >    ACC: [id number] EXAM:  CT ABDOMEN AND PELVIS IC   ORDERED BY: YADI CARLOS     PROCEDURE DATE:  2023          INTERPRETATION:  CLINICAL INFORMATION: 68 years  Female with ro divertic.    COMPARISON: 3/18/2023    CONTRAST/COMPLICATIONS:  IV Contrast: Omnipaque 350  90 cc administered   10 cc discarded  Oral Contrast: NONE  Complications: None reported at time of study completion    PROCEDURE:  CT of the Abdomen and Pelvis was performed.  Sagittal and coronal reformats were performed.    FINDINGS:  LOWER CHEST: Mild right basilar dependent atelectasis.    LIVER: Stable left lobe hypodensity too small to characterize.  BILE DUCTS: Normal caliber.  GALLBLADDER: Within normal limits.  SPLEEN: Within normal limits.  PANCREAS: Within normal limits.  ADRENALS: Within normal limits.  KIDNEYS/URETERS: Within normal limits.    BLADDER: Within normal limits.  REPRODUCTIVE ORGANS: Unremarkable uterus. Redemonstrated 7.3 x 6.9 cm   right ovarian cyst and 5.9 x 4.5 cm left ovarian cyst.    BOWEL: No bowel obstruction. Redemonstrated sigmoid diverticulitis with   pericolonic fat stranding. There is now a 3.9 x 1.8 cm low-attenuation   focus in the sigmoid wall (2:84) suggestive of a developing intramural   abscess. There is an adjacent 1.5 x 0.7 cm air collection (2:85) which   may represent microperforation or air in a diverticula. Appendix is not   visualized. No evidence of inflammation in the pericecal region.  PERITONEUM: No ascites. No remote pneumoperitoneum.  VESSELS: Atherosclerotic changes.  RETROPERITONEUM/LYMPH NODES: No lymphadenopathy.  ABDOMINAL WALL: Within normal limits.  BONES: Degenerative changes.    IMPRESSION:  Worsening sigmoid diverticulitis with probable developing intramural   abscess and possible microperforation.    Large bilateral adnexal cysts unchanged. Recommend GYN follow-up.

## 2023-04-05 NOTE — CARE COORDINATION ASSESSMENT. - NSPASTMEDSURGHISTORY_GEN_ALL_CORE_FT
PAST MEDICAL & SURGICAL HISTORY:  Cervical cancer  with leep procedure, denies chemo/ radiation      S/P angioplasty with stent   in [place]  and  in [place]      Status post bunionectomy  right with hardware      Hypertension      Hypercholesteremia      S/P coronary artery stent placement   in [place] and  in [place]      CAD (coronary artery disease)      Status post       Allergy  PCN      Seasonal allergies      GERD (gastroesophageal reflux disease)      Torn ligament  right 3rd and 4th metatarsal      Diverticulitis      Cervical cancer  LEEP

## 2023-04-05 NOTE — CONSULT NOTE ADULT - SUBJECTIVE AND OBJECTIVE BOX
[facility] - Dipti Mane Pannella, Patel, Savella, Goodger  Office Number: [phone]    Initial Consult Note    CHIEF COMPLAINT: Patient is a 68y old  Female who presents with a chief complaint of Acute diverticulitis w/ evidence of microperforation (2023 08:32)      HPI:  Pt is a 69yo F w/ PMHx of CAD s/p stent x2 in  &   ASA and plavix , and 5 episodes of diverticulitis in the past all treated with IV antibiotics. She is presenting to the ED with sudden onset abdominal pain, she reports that the pain is similar to past episodes in character except reports that this time the pain came on more suddenly. In the past she would have mild pain that  would progressively gets worse but this time it was bad very quickly. She reports taking tylenol at home that gave little relief. She denies fevers, chills, nausea or vomiting. Imaging showing evidence of possible microperforation, plan for possible surgical intervention  This morning she is feeling better.  She denies CP, SOB, palpitations, recent syncope.  Prior to this she is very active, exercises 3x/week - walks for about 20-30min.      TTE 2021 EF 65%, mild-mod AI  Nuc stress test 2021 normal myocardial perfusion      PAST MEDICAL & SURGICAL HISTORY:  Cervical cancer  with leep procedure, denies chemo/ radiation      CAD (coronary artery disease)      S/P coronary artery stent placement   in [place] and  in [facility]      Hypercholesteremia      Hypertension      Diverticulitis      Torn ligament  right 3rd and 4th metatarsal      GERD (gastroesophageal reflux disease)      Seasonal allergies      Allergy  PCN      Status post bunionectomy  right with hardware      S/P angioplasty with stent   in [place]  and  in [place]      Status post       Cervical cancer  LEEP          SOCIAL HISTORY:  No tobacco, ethanol, or drug abuse.    FAMILY HISTORY:  Family history of heart disease (Father)    Family history of prostate cancer in father (Father)      No family history of acute MI or sudden cardiac death.    MEDICATIONS  (STANDING):  aspirin enteric coated 81 milliGRAM(s) Oral daily  enoxaparin Injectable 40 milliGRAM(s) SubCutaneous every 24 hours  ertapenem  IVPB      ertapenem  IVPB 1000 milliGRAM(s) IV Intermittent every 24 hours  fluticasone propionate 50 MICROgram(s)/spray Nasal Spray 1 Spray(s) Both Nostrils <User Schedule>  lactated ringers. 1000 milliLiter(s) (100 mL/Hr) IV Continuous <Continuous>  losartan 50 milliGRAM(s) Oral daily  metoprolol succinate ER 50 milliGRAM(s) Oral daily  montelukast 10 milliGRAM(s) Oral daily  potassium chloride  10 mEq/100 mL IVPB 10 milliEquivalent(s) IV Intermittent every 1 hour  simvastatin 20 milliGRAM(s) Oral at bedtime    MEDICATIONS  (PRN):  acetaminophen     Tablet .. 650 milliGRAM(s) Oral every 6 hours PRN Temp greater or equal to 38C (100.4F), Mild Pain (1 - 3)  morphine  - Injectable 2 milliGRAM(s) IV Push every 4 hours PRN Moderate Pain (4 - 6)  morphine  - Injectable 4 milliGRAM(s) IV Push every 4 hours PRN Severe Pain (7 - 10)  ondansetron Injectable 4 milliGRAM(s) IV Push every 6 hours PRN Nausea and/or Vomiting      Allergies    codeine (Vomiting)  Flagyl (Vomiting; Nausea)  penicillin (Rash)    Intolerances        REVIEW OF SYSTEMS:    CONSTITUTIONAL: No weakness, fevers or chills  EYES/ENT: No visual changes;  No vertigo or throat pain   NECK: No pain or stiffness  RESPIRATORY: No cough, wheezing, hemoptysis; No shortness of breath  CARDIOVASCULAR: No chest pain or palpitations  GASTROINTESTINAL: + abdominal pain. + nausea, vomiting, no hematemesis; No diarrhea or constipation. No melena or hematochezia.  GENITOURINARY: No dysuria, frequency or hematuria  NEUROLOGICAL: No numbness or weakness  SKIN: No itching or rash  All other review of systems is negative unless indicated above    VITAL SIGNS:   Vital Signs Last 24 Hrs  T(C): 37.4 (2023 05:16), Max: 37.7 (2023 22:31)  T(F): 99.4 (2023 05:16), Max: 99.9 (2023 22:31)  HR: 80 (2023 05:16) (70 - 80)  BP: 122/68 (2023 05:16) (94/55 - 142/83)  BP(mean): --  RR: 18 (2023 05:16) (16 - 18)  SpO2: 92% (2023 05:16) (92% - 98%)    Parameters below as of 2023 05:16  Patient On (Oxygen Delivery Method): room air        I&O's Summary      On Exam:    Constitutional: NAD, alert and oriented x 3  Lungs:  Non-labored, breath sounds are clear bilaterally, No wheezing, rales or rhonchi  Cardiovascular: RRR.  S1 and S2 positive.  No murmurs, rubs, gallops or clicks  Gastrointestinal: Bowel Sounds present, soft, nontender.   Lymph: No peripheral edema. No cervical lymphadenopathy.  Neurological: Alert, no focal deficits  Skin: No rashes or ulcers   Psych:  Mood & affect appropriate.    LABS: All Labs Reviewed:                        11.5   10.82 )-----------( 243      ( 2023 07:02 )             34.1                         13.1   10.68 )-----------( 294      ( 2023 16:25 )             39.9     2023 07:02    137    |  106    |  8      ----------------------------<  130    3.2     |  26     |  0.56   2023 16:25    140    |  106    |  15     ----------------------------<  96     3.6     |  30     |  0.63     Ca    8.6        2023 07:02  Ca    9.9        2023 16:25    TPro  7.2    /  Alb  3.8    /  TBili  0.5    /  DBili  x      /  AST  20     /  ALT  30     /  AlkPhos  82     2023 16:25    PT/INR - ( 2023 07:02 )   PT: 15.2 sec;   INR: 1.30 ratio               Blood Culture:         RADIOLOGY:    EKG:

## 2023-04-05 NOTE — CARE COORDINATION ASSESSMENT. - CURRENT MENTAL STATUS/COGNITIVE FUNCTIONING
Met patient at bedside.  Explained role of CM, verbalized understanding. Pt was made aware a CM will remain available through hospitalization.  Contact information given in discharge/ transitions resource folder.   Pts fiance was present for interview, pt gave consent for CM to speak with him present/alert/oriented to person/oriented to place/oriented to time/oriented to situation/recall memory is intact

## 2023-04-05 NOTE — PROGRESS NOTE ADULT - ASSESSMENT
68yoF w/ PMHx significant for CAD s/p stent x2 on ASA and plavix (last took plavix 4/3) and multiple episodes of diverticulitis x 5 all treated medically, presenting now with another episode of acute diverticulitis with imaging showing evidence of possible microperforation.  68yoF w/ PMHx significant for CAD s/p stent x2 on ASA and plavix (last took plavix 4/3) and multiple episodes of diverticulitis x 5 all treated medically, presenting now with another episode of acute diverticulitis with imaging showing evidence of possible microperforation.   Surg. Att  Pt. seen and examined. She remains tender to palpation in left and right lower quadrants. She has a GI function, but is afraid to eat.   Labs were reviewed as well as imaging studies.  ID was asked to see the pt.  We are planning to operate on the pt. this Saturday. Surgery was discussed with th patient and her .   Case was also discussed with Mike Ho Gyn, who will participate in surgery.  Cardiology was asked to see the patient. Plavix is on hold.

## 2023-04-05 NOTE — PROGRESS NOTE ADULT - PROBLEM SELECTOR PLAN 1
- VSS, low grade fever to 99.4  - WC stable at 10k today, hypokalemic (repleted)  - Pending consult to cardiology for pre-operative risk stratification  - Continue to hold plavix   - Continue ertapenem   - NPO, IVF  - Pain regimen/supportive care    Will discuss with Dr. Cardenas - VSS, low grade fever to 99.4  - WC stable at 10k today, hypokalemic (repleted)  - Pending consult to cardiology for pre-operative risk stratification  - Continue to hold plavix   - Continue ertapenem   - Adv to [facility], IVF  - Pain regimen/supportive care    To discuss with Dr. Cardenas - VSS, low grade fever to 99.4  - WC stable at 10k today, hypokalemic (repleted)  - Pending consult to cardiology for pre-operative risk stratification  - Continue to hold plavix   - Continue ertapenem   - Adv to [facility], IVF  - Pain regimen/supportive care    Discussed with Dr. Cardenas

## 2023-04-06 LAB
ANION GAP SERPL CALC-SCNC: 2 MMOL/L — LOW (ref 5–17)
BUN SERPL-MCNC: 5 MG/DL — LOW (ref 7–23)
CALCIUM SERPL-MCNC: 8.7 MG/DL — SIGNIFICANT CHANGE UP (ref 8.5–10.1)
CHLORIDE SERPL-SCNC: 109 MMOL/L — HIGH (ref 96–108)
CO2 SERPL-SCNC: 30 MMOL/L — SIGNIFICANT CHANGE UP (ref 22–31)
CREAT SERPL-MCNC: 0.64 MG/DL — SIGNIFICANT CHANGE UP (ref 0.5–1.3)
EGFR: 96 ML/MIN/1.73M2 — SIGNIFICANT CHANGE UP
GLUCOSE SERPL-MCNC: 116 MG/DL — HIGH (ref 70–99)
HCT VFR BLD CALC: 35.1 % — SIGNIFICANT CHANGE UP (ref 34.5–45)
HGB BLD-MCNC: 11.4 G/DL — LOW (ref 11.5–15.5)
MCHC RBC-ENTMCNC: 29.9 PG — SIGNIFICANT CHANGE UP (ref 27–34)
MCHC RBC-ENTMCNC: 32.5 GM/DL — SIGNIFICANT CHANGE UP (ref 32–36)
MCV RBC AUTO: 92.1 FL — SIGNIFICANT CHANGE UP (ref 80–100)
NRBC # BLD: 0 /100 WBCS — SIGNIFICANT CHANGE UP (ref 0–0)
PLATELET # BLD AUTO: 224 K/UL — SIGNIFICANT CHANGE UP (ref 150–400)
POTASSIUM SERPL-MCNC: 3.3 MMOL/L — LOW (ref 3.5–5.3)
POTASSIUM SERPL-SCNC: 3.3 MMOL/L — LOW (ref 3.5–5.3)
RBC # BLD: 3.81 M/UL — SIGNIFICANT CHANGE UP (ref 3.8–5.2)
RBC # FLD: 13 % — SIGNIFICANT CHANGE UP (ref 10.3–14.5)
SODIUM SERPL-SCNC: 141 MMOL/L — SIGNIFICANT CHANGE UP (ref 135–145)
WBC # BLD: 9.06 K/UL — SIGNIFICANT CHANGE UP (ref 3.8–10.5)
WBC # FLD AUTO: 9.06 K/UL — SIGNIFICANT CHANGE UP (ref 3.8–10.5)

## 2023-04-06 PROCEDURE — 99232 SBSQ HOSP IP/OBS MODERATE 35: CPT

## 2023-04-06 RX ORDER — POTASSIUM CHLORIDE 20 MEQ
40 PACKET (EA) ORAL EVERY 4 HOURS
Refills: 0 | Status: COMPLETED | OUTPATIENT
Start: 2023-04-06 | End: 2023-04-06

## 2023-04-06 RX ORDER — ACETAMINOPHEN 500 MG
1000 TABLET ORAL ONCE
Refills: 0 | Status: COMPLETED | OUTPATIENT
Start: 2023-04-06 | End: 2023-04-06

## 2023-04-06 RX ADMIN — Medication 30 MILLILITER(S): at 17:22

## 2023-04-06 RX ADMIN — PIPERACILLIN AND TAZOBACTAM 25 GRAM(S): 4; .5 INJECTION, POWDER, LYOPHILIZED, FOR SOLUTION INTRAVENOUS at 13:37

## 2023-04-06 RX ADMIN — Medication 1000 MILLIGRAM(S): at 17:50

## 2023-04-06 RX ADMIN — Medication 40 MILLIEQUIVALENT(S): at 18:17

## 2023-04-06 RX ADMIN — Medication 1000 MILLIGRAM(S): at 09:57

## 2023-04-06 RX ADMIN — Medication 50 MILLIGRAM(S): at 06:07

## 2023-04-06 RX ADMIN — Medication 1000 MILLIGRAM(S): at 00:47

## 2023-04-06 RX ADMIN — MONTELUKAST 10 MILLIGRAM(S): 4 TABLET, CHEWABLE ORAL at 13:35

## 2023-04-06 RX ADMIN — ENOXAPARIN SODIUM 40 MILLIGRAM(S): 100 INJECTION SUBCUTANEOUS at 06:07

## 2023-04-06 RX ADMIN — Medication 30 MILLILITER(S): at 00:17

## 2023-04-06 RX ADMIN — LOSARTAN POTASSIUM 50 MILLIGRAM(S): 100 TABLET, FILM COATED ORAL at 06:07

## 2023-04-06 RX ADMIN — Medication 1 SPRAY(S): at 06:08

## 2023-04-06 RX ADMIN — PIPERACILLIN AND TAZOBACTAM 25 GRAM(S): 4; .5 INJECTION, POWDER, LYOPHILIZED, FOR SOLUTION INTRAVENOUS at 06:24

## 2023-04-06 RX ADMIN — Medication 40 MILLIEQUIVALENT(S): at 13:36

## 2023-04-06 RX ADMIN — Medication 400 MILLIGRAM(S): at 17:22

## 2023-04-06 RX ADMIN — Medication 400 MILLIGRAM(S): at 09:26

## 2023-04-06 RX ADMIN — Medication 81 MILLIGRAM(S): at 13:35

## 2023-04-06 RX ADMIN — Medication 400 MILLIGRAM(S): at 00:17

## 2023-04-06 NOTE — PROGRESS NOTE ADULT - ASSESSMENT
67 yo F w/ PMHx significant for CAD s/p stent x2 on ASA and plavix (last took plavix 4/3) and multiple episodes of diverticulitis x 5 all treated medically, presenting now with another episode of acute diverticulitis with imaging showing evidence of possible microperforation.     Plan:  - WBC downtrending 9(10.8)  - card cleared for surgery  - Zosyn per ID recommendations  - Hold Plavix  - Pain/Nausea management PRN  - Plan for OR Saturday

## 2023-04-06 NOTE — PROGRESS NOTE ADULT - ASSESSMENT
67yo F w/ PMHx of CAD s/p stent x2 in 2007 & 2012  ASA and plavix (last took plavix yesterday) , and 5 episodes of diverticulitis in the past all treated with IV antibiotics. Presented to the ED with sudden onset abdominal pain, she reports that the pain is similar to past episodes in character except reports that this time the pain came on more suddenly. In the past she would have mild pain that  would progressively gets worse but this time it was bad very quickly. She reports taking tylenol at home that gave little relief. She denies fevers, chills, nausea or vomiting.  Found to have diverticulitis with micro perf and start on abx with plan for surgery.    RECOMMENDATIONS  -reviewed with pt and distant PCN rash and has tolerated Zosyn and even recently no issue with augmentin  -changed abx to Zosyn and pt tolerating so rec continuing  -agree with surgical plan    Thank you for consulting us and involving us in the management of this most interesting and challenging case.  We will follow along in the care of this patient. Please call us at [phone] or text me directly on my cell# at [phone] with any concerns.

## 2023-04-06 NOTE — PROGRESS NOTE ADULT - SUBJECTIVE AND OBJECTIVE BOX
SHARIF/ELSY    SUBJECTIVE:  Patient is doing well this morning, seen and examined at bedside, denies any new complaints, persistent lower abdominal pain. Denies any nausea, vomiting, chest pain, shortness of breath, calf tenderness, fever or chills. Reports that she has not had a bowel movement in the past two days and is passing minimal flatus. Tolerating diet, ambulating as tolerated.     MEDICATIONS  (STANDING):  aspirin enteric coated 81 milliGRAM(s) Oral daily  enoxaparin Injectable 40 milliGRAM(s) SubCutaneous every 24 hours  fluticasone propionate 50 MICROgram(s)/spray Nasal Spray 1 Spray(s) Both Nostrils <User Schedule>  lactated ringers. 1000 milliLiter(s) (75 mL/Hr) IV Continuous <Continuous>  losartan 50 milliGRAM(s) Oral daily  metoprolol succinate ER 50 milliGRAM(s) Oral daily  montelukast 10 milliGRAM(s) Oral daily  piperacillin/tazobactam IVPB.. 3.375 Gram(s) IV Intermittent every 8 hours  simvastatin 20 milliGRAM(s) Oral at bedtime    MEDICATIONS  (PRN):  aluminum hydroxide/magnesium hydroxide/simethicone Suspension 30 milliLiter(s) Oral every 4 hours PRN Dyspepsia  morphine  - Injectable 2 milliGRAM(s) IV Push every 4 hours PRN Moderate Pain (4 - 6)  morphine  - Injectable 4 milliGRAM(s) IV Push every 4 hours PRN Severe Pain (7 - 10)  ondansetron Injectable 4 milliGRAM(s) IV Push every 6 hours PRN Nausea and/or Vomiting      Vital Signs Last 24 Hrs  T(C): 37.2 (2023 04:46), Max: 37.2 (2023 04:46)  T(F): 99 (2023 04:46), Max: 99 (2023 04:46)  HR: 66 (2023 04:46) (66 - 79)  BP: 146/74 (2023 04:46) (129/70 - 146/74)  BP(mean): --  RR: 16 (2023 04:46) (16 - 18)  SpO2: 93% (2023 04:46) (93% - 98%)    Parameters below as of 2023 04:46  Patient On (Oxygen Delivery Method): room air        PHYSICAL EXAM:  Constitutional: AAOx3, no acute distress  HEENT: NCAT, airway patent  Cardiovascular: RRR, pulses present bilaterally  Respiratory: nonlabored breathing  Gastrointestinal: abdomen soft, diffusely ttp, softly distended, no rebound, +voluntary guarding, no palpable masses, not peritoneal   Neuro: no focal deficits  Extremities: non edematous, no calf pain bilaterally    I&O's Detail      LABS:                        11.4   9.06  )-----------( 224      ( 2023 07:46 )             35.1     04-06    141  |  109<H>  |  5<L>  ----------------------------<  116<H>  3.3<L>   |  30  |  0.64    Ca    8.7      2023 07:46    TPro  7.2  /  Alb  3.8  /  TBili  0.5  /  DBili  x   /  AST  20  /  ALT  30  /  AlkPhos  82  04-04    PT/INR - ( 2023 07:02 )   PT: 15.2 sec;   INR: 1.30 ratio           Urinalysis Basic - ( 2023 17:05 )    Color: Pale Yellow / Appearance: Clear / S.005 / pH: x  Gluc: x / Ketone: Trace  / Bili: Negative / Urobili: Negative   Blood: x / Protein: Negative / Nitrite: Negative   Leuk Esterase: Negative / RBC: 0-2 /HPF / WBC 0-2   Sq Epi: x / Non Sq Epi: Occasional / Bacteria: x

## 2023-04-06 NOTE — PROGRESS NOTE ADULT - SUBJECTIVE AND OBJECTIVE BOX
[facility] DIVISION of INFECTIOUS DISEASE  Elder Miajres MD PhD, Michelle Hyatt MD, Ashley Mcnally MD, Celsa Mariano MD, Johny Washburn MD  and providing coverage with Cristine Rojas MD  Providing Infectious Disease Consultations at [facility], [facility], [facility]'s    Office# [phone] to schedule follow up appointments  Answering Service for urgent calls or New Consults [phone]  Cell# to text for urgent issues Elder Mijares [phone]     infectious diseases progress note:    LUMA LANIER is a 68y y. o. Female patient    Overnight and events of the last 24hrs reviewed, pt reports she tolerating the Zosyn with no issues    Allergies    codeine (Vomiting)  Flagyl (Vomiting; Nausea)  penicillin (Rash)    Intolerances        ANTIBIOTICS/RELEVANT:  antimicrobials  piperacillin/tazobactam IVPB.- 3.375 Gram(s) IV Intermittent once  piperacillin/tazobactam IVPB.. 3.375 Gram(s) IV Intermittent every 8 hours    immunologic:    OTHER:  aluminum hydroxide/magnesium hydroxide/simethicone Suspension 30 milliLiter(s) Oral every 4 hours PRN  aspirin enteric coated 81 milliGRAM(s) Oral daily  enoxaparin Injectable 40 milliGRAM(s) SubCutaneous every 24 hours  fluticasone propionate 50 MICROgram(s)/spray Nasal Spray 1 Spray(s) Both Nostrils <User Schedule>  lactated ringers. 1000 milliLiter(s) IV Continuous <Continuous>  losartan 50 milliGRAM(s) Oral daily  metoprolol succinate ER 50 milliGRAM(s) Oral daily  montelukast 10 milliGRAM(s) Oral daily  morphine  - Injectable 2 milliGRAM(s) IV Push every 4 hours PRN  morphine  - Injectable 4 milliGRAM(s) IV Push every 4 hours PRN  ondansetron Injectable 4 milliGRAM(s) IV Push every 6 hours PRN  simvastatin 20 milliGRAM(s) Oral at bedtime      Objective:  Vital Signs Last 24 Hrs  T(C): 37.2 (2023 04:46), Max: 37.2 (2023 04:46)  T(F): 99 (2023 04:46), Max: 99 (2023 04:46)  HR: 66 (2023 04:46) (66 - 79)  BP: 146/74 (2023 04:46) (129/70 - 146/74)  BP(mean): --  RR: 16 (2023 04:46) (16 - 18)  SpO2: 93% (2023 04:46) (93% - 98%)    Parameters below as of 2023 04:46  Patient On (Oxygen Delivery Method): room air        T(C): 37.2 (23 @ 04:46), Max: 37.7 (23 @ 22:31)  T(C): 37.2 (23 @ 04:46), Max: 37.7 (23 @ 22:31)  T(C): 37.2 (23 @ 04:46), Max: 37.7 (23 @ 22:31)    PHYSICAL EXAM:  HEENT: NC atraumatic  Neck: supple  Respiratory: no accessory muscle use, breathing comfortably  Cardiovascular: distant  Gastrointestinal: normal appearing, nondistended  Extremities: no clubbing, no cyanosis,        LABS:                          11.5   10.82 )-----------( 243      ( 2023 07:02 )             34.1       WBC  10.82  @ 07:02  10.68  @ 16:25      04    137  |  106  |  8   ----------------------------<  130<H>  3.2<L>   |  26  |  0.56    Ca    8.6      2023 07:02    TPro  7.2  /  Alb  3.8  /  TBili  0.5  /  DBili  x   /  AST  20  /  ALT  30  /  AlkPhos  82        Creatinine, Serum: 0.56 mg/dL (23 @ 07:02)  Creatinine, Serum: 0.63 mg/dL (23 @ 16:25)      PT/INR - ( 2023 07:02 )   PT: 15.2 sec;   INR: 1.30 ratio           Urinalysis Basic - ( 2023 17:05 )    Color: Pale Yellow / Appearance: Clear / S.005 / pH: x  Gluc: x / Ketone: Trace  / Bili: Negative / Urobili: Negative   Blood: x / Protein: Negative / Nitrite: Negative   Leuk Esterase: Negative / RBC: 0-2 /HPF / WBC 0-2   Sq Epi: x / Non Sq Epi: Occasional / Bacteria: x            INFLAMMATORY MARKERS      MICROBIOLOGY:              RADIOLOGY & ADDITIONAL STUDIES:

## 2023-04-06 NOTE — PROGRESS NOTE ADULT - ASSESSMENT
68yoF w/ PMHx significant for CAD s/p stent x2 on ASA and plavix (last took plavix 4/3) and multiple episodes of diverticulitis x 5 all treated medically, presenting now with another episode of acute diverticulitis with imaging showing evidence of possible microperforation, plan for possible surgical intervention on 4/8      ECG shows NSR with L axis  TTE 02/2021 EF 65%, mild-mod AI  Nuc stress test 02/2021 normal myocardial perfusion  There are no cardiac complaints  Vital sign stable  Pt has no active ischemia, decompensated heart failure, unstable arrhythmia, or severe stenotic valvular disease.  Pt has no modifiable active cardiac risk factors and in the setting of intermediate risk procedure, pt is optimized as best as possible from cardiovascular standpoint to proceed with planned procedure with routine hemodynamic monitoring.     coronary artery disease s/p stent  no active ischemia  c/w ASA 81mg qd  c/w statin  holding Plavix for surgery    HTN:  c/w metoprolol and losartan  -140    - Monitor and replete lytes, keep K>4 and Mg >2  - All other workup per primary team  - Will continue to follow.    Gibson Hughes, MS ANP, [facility]  Nurse Practitioner- Cardiology   Spectra # [phone] /[phone]

## 2023-04-06 NOTE — PROGRESS NOTE ADULT - SUBJECTIVE AND OBJECTIVE BOX
[facility] -- Dipti Mane Pannella, Patel, Savella, Goodger: Office # [phone]    Follow Up:  Pre op clearance     Subjective/Observations: Patient awake and alert. Denies chest pain or sob. report lower abdomen pain. Receiving NS at 75cc/hr    REVIEW OF SYSTEMS: All other review of systems are negative unless indicated above    PAST MEDICAL & SURGICAL HISTORY:  Cervical cancer  with leep procedure, denies chemo/ radiation      CAD (coronary artery disease)      S/P coronary artery stent placement   in [place] and  in [place]      Hypercholesteremia      Hypertension      Diverticulitis      Torn ligament  right 3rd and 4th metatarsal      GERD (gastroesophageal reflux disease)      Seasonal allergies      Allergy  PCN      Status post bunionectomy  right with hardware      S/P angioplasty with stent   in [place]  and  in [facility]      Status post       Cervical cancer  LEEP          MEDICATIONS  (STANDING):  aspirin enteric coated 81 milliGRAM(s) Oral daily  enoxaparin Injectable 40 milliGRAM(s) SubCutaneous every 24 hours  fluticasone propionate 50 MICROgram(s)/spray Nasal Spray 1 Spray(s) Both Nostrils <User Schedule>  lactated ringers. 1000 milliLiter(s) (75 mL/Hr) IV Continuous <Continuous>  losartan 50 milliGRAM(s) Oral daily  metoprolol succinate ER 50 milliGRAM(s) Oral daily  montelukast 10 milliGRAM(s) Oral daily  piperacillin/tazobactam IVPB.. 3.375 Gram(s) IV Intermittent every 8 hours  simvastatin 20 milliGRAM(s) Oral at bedtime    MEDICATIONS  (PRN):  aluminum hydroxide/magnesium hydroxide/simethicone Suspension 30 milliLiter(s) Oral every 4 hours PRN Dyspepsia  morphine  - Injectable 2 milliGRAM(s) IV Push every 4 hours PRN Moderate Pain (4 - 6)  morphine  - Injectable 4 milliGRAM(s) IV Push every 4 hours PRN Severe Pain (7 - 10)  ondansetron Injectable 4 milliGRAM(s) IV Push every 6 hours PRN Nausea and/or Vomiting    Allergies    codeine (Vomiting)  Flagyl (Vomiting; Nausea)  penicillin (Rash)    Intolerances      Vital Signs Last 24 Hrs  T(C): 37.2 (2023 04:46), Max: 37.2 (2023 04:46)  T(F): 99 (2023 04:46), Max: 99 (2023 04:46)  HR: 66 (2023 04:46) (66 - 79)  BP: 146/74 (2023 04:46) (129/70 - 146/74)  BP(mean): --  RR: 16 (2023 04:46) (16 - 18)  SpO2: 93% (2023 04:46) (93% - 98%)    Parameters below as of 2023 04:46  Patient On (Oxygen Delivery Method): room air      I&O's Summary        TELE: not on monitor  PHYSICAL EXAM:  Constitutional: NAD, awake and alert,   HEENT: Moist Mucous Membranes, Anicteric  Pulmonary: Non-labored, breath sounds are clear bilaterally, No wheezing, rales or rhonchi  Cardiovascular: Regular, S1 and S2, No murmurs, No rubs, gallops or clicks  Gastrointestinal:  soft, nontender, nondistended   Lymph: No peripheral edema. No lymphadenopathy.   Skin: No visible rashes or ulcers.  Psych:  Mood & affect appropriate      LABS: All Labs Reviewed:                        11.5   1082 )-----------( 243      ( 2023 07:02 )             34.1                         13.1   10.68 )-----------( 294      ( 2023 16:25 )             39.9     2023 07:02    137    |  106    |  8      ----------------------------<  130    3.2     |  26     |  0.56   2023 16:25    140    |  106    |  15     ----------------------------<  96     3.6     |  30     |  0.63     Ca    8.6        2023 07:02  Ca    9.9        2023 16:25    TPro  7.2    /  Alb  3.8    /  TBili  0.5    /  DBili  x      /  AST  20     /  ALT  30     /  AlkPhos  82     2023 16:25   LIVER FUNCTIONS - ( 2023 16:25 )  Alb: 3.8 g/dL / Pro: 7.2 g/dL / ALK PHOS: 82 U/L / ALT: 30 U/L / AST: 20 U/L / GGT: x           PT/INR - ( 2023 07:02 )   PT: 15.2 sec;   INR: 1.30 ratio         Lactate, Blood: 1.5 mmol/L (23 @ 16:25)    12 Lead ECG:   Ventricular Rate 74 BPM    Atrial Rate 74 BPM    P-R Interval 174 ms    QRS Duration 82 ms    Q-T Interval 414 ms    QTC Calculation(Bazett) 459 ms    P Axis 34 degrees    R Axis -42 degrees    T Axis 53 degrees    Diagnosis Line Normal sinus rhythm  Left axis deviation  Abnormal ECG  When compared with ECG of 18-MAR-2023 11:41,  Nonspecific T wave abnormality now evident in Lateral leads  Confirmed by Franca Waldron ([id number]) on 2023 7:15:27 AM (23 @ 19:53)     [facility] -- Dipti Mane Pannella, Patel, Savella, Goodger: Office # [phone]    Follow Up:  Pre op clearance     Subjective/Observations: Patient awake and alert. Denies chest pain or sob. report lower abdomen pain. Receiving NS at 75cc/hr    REVIEW OF SYSTEMS: All other review of systems are negative unless indicated above    PAST MEDICAL & SURGICAL HISTORY:  Cervical cancer  with leep procedure, denies chemo/ radiation      CAD (coronary artery disease)      S/P coronary artery stent placement   in [place] and  in [place]      Hypercholesteremia      Hypertension      Diverticulitis      Torn ligament  right 3rd and 4th metatarsal      GERD (gastroesophageal reflux disease)      Seasonal allergies      Allergy  PCN      Status post bunionectomy  right with hardware      S/P angioplasty with stent   in [place]  and  in [email or web address]      Status post       Cervical cancer  LEEP          MEDICATIONS  (STANDING):  aspirin enteric coated 81 milliGRAM(s) Oral daily  enoxaparin Injectable 40 milliGRAM(s) SubCutaneous every 24 hours  fluticasone propionate 50 MICROgram(s)/spray Nasal Spray 1 Spray(s) Both Nostrils <User Schedule>  lactated ringers. 1000 milliLiter(s) (75 mL/Hr) IV Continuous <Continuous>  losartan 50 milliGRAM(s) Oral daily  metoprolol succinate ER 50 milliGRAM(s) Oral daily  montelukast 10 milliGRAM(s) Oral daily  piperacillin/tazobactam IVPB.. 3.375 Gram(s) IV Intermittent every 8 hours  simvastatin 20 milliGRAM(s) Oral at bedtime    MEDICATIONS  (PRN):  aluminum hydroxide/magnesium hydroxide/simethicone Suspension 30 milliLiter(s) Oral every 4 hours PRN Dyspepsia  morphine  - Injectable 2 milliGRAM(s) IV Push every 4 hours PRN Moderate Pain (4 - 6)  morphine  - Injectable 4 milliGRAM(s) IV Push every 4 hours PRN Severe Pain (7 - 10)  ondansetron Injectable 4 milliGRAM(s) IV Push every 6 hours PRN Nausea and/or Vomiting    Allergies    codeine (Vomiting)  Flagyl (Vomiting; Nausea)  penicillin (Rash)    Intolerances      Vital Signs Last 24 Hrs  T(C): 37.2 (2023 04:46), Max: 37.2 (2023 04:46)  T(F): 99 (2023 04:46), Max: 99 (2023 04:46)  HR: 66 (2023 04:46) (66 - 79)  BP: 146/74 (2023 04:46) (129/70 - 146/74)  BP(mean): --  RR: 16 (2023 04:46) (16 - 18)  SpO2: 93% (2023 04:46) (93% - 98%)    Parameters below as of 2023 04:46  Patient On (Oxygen Delivery Method): room air      I&O's Summary        TELE: not on monitor  PHYSICAL EXAM:  Constitutional: NAD, awake and alert,   HEENT: Moist Mucous Membranes, Anicteric  Pulmonary: Non-labored, breath sounds are clear bilaterally, No wheezing, rales or rhonchi  Cardiovascular: Regular, S1 and S2, No murmurs, No rubs, gallops or clicks  Gastrointestinal:  soft, + lower abdomen tender, nondistended   Lymph: No peripheral edema. No lymphadenopathy.   Skin: No visible rashes or ulcers.  Psych:  Mood & affect appropriate      LABS: All Labs Reviewed:                        11.5   1082 )-----------( 243      ( 2023 07:02 )             34.1                         13.1   10.68 )-----------( 294      ( 2023 16:25 )             39.9     2023 07:02    137    |  106    |  8      ----------------------------<  130    3.2     |  26     |  0.56   2023 16:25    140    |  106    |  15     ----------------------------<  96     3.6     |  30     |  0.63     Ca    8.6        2023 07:02  Ca    9.9        2023 16:25    TPro  7.2    /  Alb  3.8    /  TBili  0.5    /  DBili  x      /  AST  20     /  ALT  30     /  AlkPhos  82     2023 16:25   LIVER FUNCTIONS - ( 2023 16:25 )  Alb: 3.8 g/dL / Pro: 7.2 g/dL / ALK PHOS: 82 U/L / ALT: 30 U/L / AST: 20 U/L / GGT: x           PT/INR - ( 2023 07:02 )   PT: 15.2 sec;   INR: 1.30 ratio         Lactate, Blood: 1.5 mmol/L (23 @ 16:25)    12 Lead ECG:   Ventricular Rate 74 BPM    Atrial Rate 74 BPM    P-R Interval 174 ms    QRS Duration 82 ms    Q-T Interval 414 ms    QTC Calculation(Bazett) 459 ms    P Axis 34 degrees    R Axis -42 degrees    T Axis 53 degrees    Diagnosis Line Normal sinus rhythm  Left axis deviation  Abnormal ECG  When compared with ECG of 18-MAR-2023 11:41,  Nonspecific T wave abnormality now evident in Lateral leads  Confirmed by Franca Waldron ([id number]) on 2023 7:15:27 AM (23 @ 19:53)

## 2023-04-07 ENCOUNTER — TRANSCRIPTION ENCOUNTER (OUTPATIENT)
Age: 68
End: 2023-04-07

## 2023-04-07 LAB
ALBUMIN SERPL ELPH-MCNC: 3 G/DL — LOW (ref 3.3–5)
ALP SERPL-CCNC: 109 U/L — SIGNIFICANT CHANGE UP (ref 40–120)
ALT FLD-CCNC: 72 U/L — SIGNIFICANT CHANGE UP (ref 12–78)
ANION GAP SERPL CALC-SCNC: 6 MMOL/L — SIGNIFICANT CHANGE UP (ref 5–17)
AST SERPL-CCNC: 42 U/L — HIGH (ref 15–37)
BILIRUB SERPL-MCNC: 0.4 MG/DL — SIGNIFICANT CHANGE UP (ref 0.2–1.2)
BUN SERPL-MCNC: 4 MG/DL — LOW (ref 7–23)
CALCIUM SERPL-MCNC: 9.1 MG/DL — SIGNIFICANT CHANGE UP (ref 8.5–10.1)
CHLORIDE SERPL-SCNC: 108 MMOL/L — SIGNIFICANT CHANGE UP (ref 96–108)
CO2 SERPL-SCNC: 25 MMOL/L — SIGNIFICANT CHANGE UP (ref 22–31)
CREAT SERPL-MCNC: 0.6 MG/DL — SIGNIFICANT CHANGE UP (ref 0.5–1.3)
EGFR: 98 ML/MIN/1.73M2 — SIGNIFICANT CHANGE UP
GLUCOSE SERPL-MCNC: 100 MG/DL — HIGH (ref 70–99)
HCT VFR BLD CALC: 36.2 % — SIGNIFICANT CHANGE UP (ref 34.5–45)
HGB BLD-MCNC: 11.8 G/DL — SIGNIFICANT CHANGE UP (ref 11.5–15.5)
MAGNESIUM SERPL-MCNC: 2.2 MG/DL — SIGNIFICANT CHANGE UP (ref 1.6–2.6)
MCHC RBC-ENTMCNC: 29.5 PG — SIGNIFICANT CHANGE UP (ref 27–34)
MCHC RBC-ENTMCNC: 32.6 GM/DL — SIGNIFICANT CHANGE UP (ref 32–36)
MCV RBC AUTO: 90.5 FL — SIGNIFICANT CHANGE UP (ref 80–100)
NRBC # BLD: 0 /100 WBCS — SIGNIFICANT CHANGE UP (ref 0–0)
PHOSPHATE SERPL-MCNC: 1.9 MG/DL — LOW (ref 2.5–4.5)
PLATELET # BLD AUTO: 288 K/UL — SIGNIFICANT CHANGE UP (ref 150–400)
POTASSIUM SERPL-MCNC: 3.7 MMOL/L — SIGNIFICANT CHANGE UP (ref 3.5–5.3)
POTASSIUM SERPL-SCNC: 3.7 MMOL/L — SIGNIFICANT CHANGE UP (ref 3.5–5.3)
PROT SERPL-MCNC: 7.5 G/DL — SIGNIFICANT CHANGE UP (ref 6–8.3)
RBC # BLD: 4 M/UL — SIGNIFICANT CHANGE UP (ref 3.8–5.2)
RBC # FLD: 12.9 % — SIGNIFICANT CHANGE UP (ref 10.3–14.5)
SARS-COV-2 RNA SPEC QL NAA+PROBE: SIGNIFICANT CHANGE UP
SODIUM SERPL-SCNC: 139 MMOL/L — SIGNIFICANT CHANGE UP (ref 135–145)
WBC # BLD: 9.18 K/UL — SIGNIFICANT CHANGE UP (ref 3.8–10.5)
WBC # FLD AUTO: 9.18 K/UL — SIGNIFICANT CHANGE UP (ref 3.8–10.5)

## 2023-04-07 PROCEDURE — 99232 SBSQ HOSP IP/OBS MODERATE 35: CPT

## 2023-04-07 RX ORDER — SOD SULF/SODIUM/NAHCO3/KCL/PEG
1000 SOLUTION, RECONSTITUTED, ORAL ORAL
Refills: 0 | Status: COMPLETED | OUTPATIENT
Start: 2023-04-07 | End: 2023-04-07

## 2023-04-07 RX ORDER — ACETAMINOPHEN 500 MG
1000 TABLET ORAL ONCE
Refills: 0 | Status: COMPLETED | OUTPATIENT
Start: 2023-04-07 | End: 2023-04-07

## 2023-04-07 RX ORDER — NEOMYCIN SULFATE 500 MG/1
1000 TABLET ORAL
Refills: 0 | Status: COMPLETED | OUTPATIENT
Start: 2023-04-07 | End: 2023-04-07

## 2023-04-07 RX ORDER — ERYTHROMYCIN ETHYLSUCCINATE 400 MG
1000 TABLET ORAL
Refills: 0 | Status: COMPLETED | OUTPATIENT
Start: 2023-04-07 | End: 2023-04-07

## 2023-04-07 RX ADMIN — Medication 1 SPRAY(S): at 05:42

## 2023-04-07 RX ADMIN — PIPERACILLIN AND TAZOBACTAM 25 GRAM(S): 4; .5 INJECTION, POWDER, LYOPHILIZED, FOR SOLUTION INTRAVENOUS at 15:37

## 2023-04-07 RX ADMIN — LOSARTAN POTASSIUM 50 MILLIGRAM(S): 100 TABLET, FILM COATED ORAL at 05:41

## 2023-04-07 RX ADMIN — Medication 81 MILLIGRAM(S): at 12:36

## 2023-04-07 RX ADMIN — Medication 1000 MILLILITER(S): at 15:09

## 2023-04-07 RX ADMIN — SODIUM CHLORIDE 75 MILLILITER(S): 9 INJECTION, SOLUTION INTRAVENOUS at 09:09

## 2023-04-07 RX ADMIN — Medication 1000 MILLIGRAM(S): at 14:13

## 2023-04-07 RX ADMIN — PIPERACILLIN AND TAZOBACTAM 25 GRAM(S): 4; .5 INJECTION, POWDER, LYOPHILIZED, FOR SOLUTION INTRAVENOUS at 22:42

## 2023-04-07 RX ADMIN — Medication 50 MILLIGRAM(S): at 05:42

## 2023-04-07 RX ADMIN — NEOMYCIN SULFATE 1000 MILLIGRAM(S): 500 TABLET ORAL at 12:36

## 2023-04-07 RX ADMIN — PIPERACILLIN AND TAZOBACTAM 25 GRAM(S): 4; .5 INJECTION, POWDER, LYOPHILIZED, FOR SOLUTION INTRAVENOUS at 05:41

## 2023-04-07 RX ADMIN — Medication 1000 MILLILITER(S): at 18:43

## 2023-04-07 RX ADMIN — NEOMYCIN SULFATE 1000 MILLIGRAM(S): 500 TABLET ORAL at 22:43

## 2023-04-07 RX ADMIN — SODIUM CHLORIDE 75 MILLILITER(S): 9 INJECTION, SOLUTION INTRAVENOUS at 22:55

## 2023-04-07 RX ADMIN — ENOXAPARIN SODIUM 40 MILLIGRAM(S): 100 INJECTION SUBCUTANEOUS at 05:41

## 2023-04-07 RX ADMIN — Medication 1000 MILLIGRAM(S): at 12:36

## 2023-04-07 RX ADMIN — MONTELUKAST 10 MILLIGRAM(S): 4 TABLET, CHEWABLE ORAL at 12:36

## 2023-04-07 RX ADMIN — NEOMYCIN SULFATE 1000 MILLIGRAM(S): 500 TABLET ORAL at 14:13

## 2023-04-07 RX ADMIN — Medication 1000 MILLIGRAM(S): at 22:43

## 2023-04-07 RX ADMIN — Medication 400 MILLIGRAM(S): at 14:17

## 2023-04-07 RX ADMIN — SIMVASTATIN 20 MILLIGRAM(S): 20 TABLET, FILM COATED ORAL at 22:43

## 2023-04-07 NOTE — PROGRESS NOTE ADULT - SUBJECTIVE AND OBJECTIVE BOX
[facility] -- Alhaji Sim,  Dipti, García Martinez Savella, Goodger  Office # [phone]    Follow Up:  Cardiac clearance     Subjective/Observations: No events overnight resting comfortably in bed.  No complaints of chest pain, dyspnea, or palpitations reported. No signs of orthopnea or PND. Abdominal pain improved, tolerating meals/ clear diet.       REVIEW OF SYSTEMS: All other review of systems is negative unless indicated above  PAST MEDICAL & SURGICAL HISTORY:  Cervical cancer  with leep procedure, denies chemo/ radiation      CAD (coronary artery disease)      S/P coronary artery stent placement   in [place] and  in [facility]      Hypercholesteremia      Hypertension      Diverticulitis      Torn ligament  right 3rd and 4th metatarsal      GERD (gastroesophageal reflux disease)      Seasonal allergies      Allergy  PCN      Status post bunionectomy  right with hardware      S/P angioplasty with stent   in [place]  and  in [facility]      Status post       Cervical cancer  LEEP        MEDICATIONS  (STANDING):  aspirin enteric coated 81 milliGRAM(s) Oral daily  enoxaparin Injectable 40 milliGRAM(s) SubCutaneous every 24 hours  erythromycin     enteric coated 1000 milliGRAM(s) Oral <User Schedule>  fluticasone propionate 50 MICROgram(s)/spray Nasal Spray 1 Spray(s) Both Nostrils <User Schedule>  lactated ringers. 1000 milliLiter(s) (75 mL/Hr) IV Continuous <Continuous>  losartan 50 milliGRAM(s) Oral daily  metoprolol succinate ER 50 milliGRAM(s) Oral daily  montelukast 10 milliGRAM(s) Oral daily  neomycin 1000 milliGRAM(s) Oral <User Schedule>  piperacillin/tazobactam IVPB.. 3.375 Gram(s) IV Intermittent every 8 hours  polyethylene glycol/electrolyte Solution 1000 milliLiter(s) Oral <User Schedule>  simvastatin 20 milliGRAM(s) Oral at bedtime    MEDICATIONS  (PRN):  aluminum hydroxide/magnesium hydroxide/simethicone Suspension 30 milliLiter(s) Oral every 4 hours PRN Dyspepsia  morphine  - Injectable 2 milliGRAM(s) IV Push every 4 hours PRN Moderate Pain (4 - 6)  morphine  - Injectable 4 milliGRAM(s) IV Push every 4 hours PRN Severe Pain (7 - 10)  ondansetron Injectable 4 milliGRAM(s) IV Push every 6 hours PRN Nausea and/or Vomiting    Allergies    codeine (Vomiting)  Flagyl (Vomiting; Nausea)  penicillin (Rash)    Intolerances      Vital Signs Last 24 Hrs  T(C): 36.6 (2023 04:36), Max: 36.6 (2023 04:36)  T(F): 97.8 (2023 04:36), Max: 97.8 (2023 04:36)  HR: 73 (2023 04:36) (68 - 80)  BP: 143/82 (2023 04:36) (137/80 - 148/84)  BP(mean): --  RR: 17 (2023 04:36) (17 - 18)  SpO2: 93% (2023 04:36) (93% - 95%)    Parameters below as of 2023 04:36  Patient On (Oxygen Delivery Method): room air      I&O's Summary      TELE: not on monitor  PHYSICAL EXAM:  Constitutional: NAD, awake and alert,   HEENT: Moist Mucous Membranes, Anicteric  Pulmonary: Non-labored, breath sounds are clear bilaterally, No wheezing, rales or rhonchi  Cardiovascular: Regular, S1 and S2, No murmurs, No rubs, gallops or clicks  Gastrointestinal:  soft, + lower abdomen tender, nondistended   Lymph: No peripheral edema. No lymphadenopathy.   Skin: No visible rashes or ulcers.  Psych:  Mood & affect appropriate    LABS: All Labs Reviewed:                        11.8   9.18  )-----------( 288      ( 2023 07:39 )             36.2                         11.4   9.06  )-----------( 224      ( 2023 07:46 )             35.1                         11.5   10.82 )-----------( 243      ( 2023 07:02 )             34.1     2023 07:39    139    |  108    |  4      ----------------------------<  100    3.7     |  25     |  0.60   2023 07:46    141    |  109    |  5      ----------------------------<  116    3.3     |  30     |  0.64   2023 07:02    137    |  106    |  8      ----------------------------<  130    3.2     |  26     |  0.56     Ca    9.1        2023 07:39  Ca    8.7        2023 07:46  Ca    8.6        2023 07:02  Phos  1.9       2023 07:39  Mg     2.2       2023 07:39    TPro  7.5    /  Alb  3.0    /  TBili  0.4    /  DBili  x      /  AST  42     /  ALT  72     /  AlkPhos  109    2023 07:39  TPro  7.2    /  Alb  3.8    /  TBili  0.5    /  DBili  x      /  AST  20     /  ALT  30     /  AlkPhos  82     2023 16:25          12 Lead ECG:   Ventricular Rate 74 BPM    Atrial Rate 74 BPM    P-R Interval 174 ms    QRS Duration 82 ms    Q-T Interval 414 ms    QTC Calculation(Bazett) 459 ms    P Axis 34 degrees    R Axis -42 degrees    T Axis 53 degrees    Diagnosis Line Normal sinus rhythm  Left axis deviation  Abnormal ECG  When compared with ECG of 18-MAR-2023 11:41,  Nonspecific T wave abnormality now evident in Lateral leads  Confirmed by Franca Waldron ([id number]) on 2023 7:15:27 AM (23 @ 19:53)

## 2023-04-07 NOTE — PROGRESS NOTE ADULT - ASSESSMENT
68yoF w/ PMHx significant for CAD s/p stent x2 on ASA and plavix (last took plavix 4/3) and multiple episodes of diverticulitis x 5 all treated medically, presenting now with another episode of acute diverticulitis with imaging showing evidence of possible microperforation, plan for possible surgical intervention on 4/8    Cardiac optimization, CAD  -There are no cardiac complaints  -ECG shows NSR with L axis  - Nuc stress test 02/2021 normal myocardial perfusion  - Hx of CAD s/p stent  - continue ASA 81mg qd and statin  - Plavix held for surgery     -TTE 02/2021 EF 65%, mild-mod AI  - No evidence of significant volume overload    - -140s, continue metoprolol and losartan    -Pre-op for 4/8, NPO at midnight  -Pt has no active ischemia, decompensated heart failure, unstable arrhythmia, or severe stenotic valvular disease.  Pt has no modifiable active cardiac risk factors and in the setting of intermediate risk procedure, pt is optimized as best as possible from cardiovascular standpoint to proceed with planned procedure with routine hemodynamic monitoring.   - Antibiotics per ID     - DVT ppx Lovenox SQ   - Monitor and replete lytes, keep K>4, Mg>2.  - Will continue to follow.    Benito Russo NP  Nurse Practitioner- Cardiology   Spectra #[phone]

## 2023-04-07 NOTE — DIETITIAN INITIAL EVALUATION ADULT - PERTINENT LABORATORY DATA
04-07    139  |  108  |  4<L>  ----------------------------<  100<H>  3.7   |  25  |  0.60    Ca    9.1      07 Apr 2023 07:39  Phos  1.9     04-07  Mg     2.2     04-07    TPro  7.5  /  Alb  3.0<L>  /  TBili  0.4  /  DBili  x   /  AST  42<H>  /  ALT  72  /  AlkPhos  109  04-07

## 2023-04-07 NOTE — PROGRESS NOTE ADULT - ASSESSMENT
67yo F w/ PMHx of CAD s/p stent x2 in 2007 & 2012  ASA and plavix (last took plavix yesterday) , and 5 episodes of diverticulitis in the past all treated with IV antibiotics. Presented to the ED with sudden onset abdominal pain, she reports that the pain is similar to past episodes in character except reports that this time the pain came on more suddenly. In the past she would have mild pain that  would progressively gets worse but this time it was bad very quickly. She reports taking tylenol at home that gave little relief. She denies fevers, chills, nausea or vomiting.  Found to have diverticulitis with micro perf and start on abx with plan for surgery.    RECOMMENDATIONS  -reviewed with pt and distant PCN rash and has tolerated Zosyn and even recently no issue with augmentin  -c/w Zosyn for now, to tolerating without issue  -tentative plan for surgery tomorrow noted  -if purulence or collection please send for culture    Dr. Celsa Mariano will be covering 4/8 & 4/9     Johny Washburn M.D.  [facility], Division of Infectious Diseases  [phone]  After 5pm on weekdays and all day on weekends - please call [phone]

## 2023-04-07 NOTE — DIETITIAN INITIAL EVALUATION ADULT - OTHER INFO
Reason for Admission: Acute diverticulitis w/ evidence of microperforation  History of Present Illness:   Pt is a 67yo F w/ PMHx of CAD s/p stent x2 in 2007 & 2012  ASA and plavix (last took plavix yesterday) , and 5 episodes of diverticulitis in the past all treated with IV antibiotics. She is presenting to the ED with sudden onset abdominal pain, she reports that the pain is similar to past episodes in character except reports that this time the pain came on more suddenly. In the past she would have mild pain that  would progressively gets worse but this time it was bad very quickly. She reports taking tylenol at home that gave little relief. She denies fevers, chills, nausea or vomiting.   weight this admit 168#  LR 75 ml hr  diverticulitis with microperforation for OR tomorrow  4/6 BM

## 2023-04-07 NOTE — PROGRESS NOTE ADULT - SUBJECTIVE AND OBJECTIVE BOX
[facility], Division of Infectious Diseases  SAEN Lozano Y. Patel, S. Shah, G. Wu  [phone]  ([phone] - weekdays after 5pm and weekends)    Name: LUMA LANIER  Age/Gender: 68y Female  MRN: [id number]    Interval History:  Notes reviewed.   No concerning overnight events.  Afebrile.   reports abd pain improved today  plans for surgery tomorrow    Allergies: codeine (Vomiting)  Flagyl (Vomiting; Nausea)  penicillin (Rash)      Objective:  Vitals:   T(F): 98.1 (04-07-23 @ 12:37), Max: 98.1 (04-07-23 @ 12:37)  HR: 71 (04-07-23 @ 12:37) (68 - 73)  BP: 154/80 (04-07-23 @ 12:37) (137/80 - 154/80)  RR: 16 (04-07-23 @ 12:37) (16 - 17)  SpO2: 94% (04-07-23 @ 12:37) (93% - 94%)  Physical Examination:  General: no acute distress  HEENT: anicteric  Cardio: S1, S2, normal rate  Resp: clear to auscultation   Abd: soft, NT, ND  Ext: no LE edema  Skin: warm, dry    Laboratory Studies:  CBC:                       11.8   9.18  )-----------( 288      ( 07 Apr 2023 07:39 )             36.2     WBC Trend:  9.18 04-07-23 @ 07:39  9.06 04-06-23 @ 07:46  10.82 04-05-23 @ 07:02  10.68 04-04-23 @ 16:25    CMP: 04-07    139  |  108  |  4<L>  ----------------------------<  100<H>  3.7   |  25  |  0.60    Ca    9.1      07 Apr 2023 07:39  Phos  1.9     04-07  Mg     2.2     04-07    TPro  7.5  /  Alb  3.0<L>  /  TBili  0.4  /  DBili  x   /  AST  42<H>  /  ALT  72  /  AlkPhos  109  04-07      LIVER FUNCTIONS - ( 07 Apr 2023 07:39 )  Alb: 3.0 g/dL / Pro: 7.5 g/dL / ALK PHOS: 109 U/L / ALT: 72 U/L / AST: 42 U/L / GGT: x               Microbiology: reviewed       Radiology: reviewed     Medications:  aluminum hydroxide/magnesium hydroxide/simethicone Suspension 30 milliLiter(s) Oral every 4 hours PRN  aspirin enteric coated 81 milliGRAM(s) Oral daily  enoxaparin Injectable 40 milliGRAM(s) SubCutaneous every 24 hours  erythromycin     enteric coated 1000 milliGRAM(s) Oral <User Schedule>  fluticasone propionate 50 MICROgram(s)/spray Nasal Spray 1 Spray(s) Both Nostrils <User Schedule>  lactated ringers. 1000 milliLiter(s) IV Continuous <Continuous>  losartan 50 milliGRAM(s) Oral daily  metoprolol succinate ER 50 milliGRAM(s) Oral daily  montelukast 10 milliGRAM(s) Oral daily  morphine  - Injectable 2 milliGRAM(s) IV Push every 4 hours PRN  morphine  - Injectable 4 milliGRAM(s) IV Push every 4 hours PRN  neomycin 1000 milliGRAM(s) Oral <User Schedule>  ondansetron Injectable 4 milliGRAM(s) IV Push every 6 hours PRN  piperacillin/tazobactam IVPB.. 3.375 Gram(s) IV Intermittent every 8 hours  polyethylene glycol/electrolyte Solution 1000 milliLiter(s) Oral <User Schedule>  simvastatin 20 milliGRAM(s) Oral at bedtime    Antimicrobials:  erythromycin     enteric coated 1000 milliGRAM(s) Oral <User Schedule>  neomycin 1000 milliGRAM(s) Oral <User Schedule>  piperacillin/tazobactam IVPB.. 3.375 Gram(s) IV Intermittent every 8 hours

## 2023-04-07 NOTE — DIETITIAN INITIAL EVALUATION ADULT - PERTINENT MEDS FT
MEDICATIONS  (STANDING):  aspirin enteric coated 81 milliGRAM(s) Oral daily  enoxaparin Injectable 40 milliGRAM(s) SubCutaneous every 24 hours  erythromycin     enteric coated 1000 milliGRAM(s) Oral <User Schedule>  fluticasone propionate 50 MICROgram(s)/spray Nasal Spray 1 Spray(s) Both Nostrils <User Schedule>  lactated ringers. 1000 milliLiter(s) (75 mL/Hr) IV Continuous <Continuous>  losartan 50 milliGRAM(s) Oral daily  metoprolol succinate ER 50 milliGRAM(s) Oral daily  montelukast 10 milliGRAM(s) Oral daily  neomycin 1000 milliGRAM(s) Oral <User Schedule>  piperacillin/tazobactam IVPB.. 3.375 Gram(s) IV Intermittent every 8 hours  polyethylene glycol/electrolyte Solution 1000 milliLiter(s) Oral <User Schedule>  simvastatin 20 milliGRAM(s) Oral at bedtime    MEDICATIONS  (PRN):  aluminum hydroxide/magnesium hydroxide/simethicone Suspension 30 milliLiter(s) Oral every 4 hours PRN Dyspepsia  morphine  - Injectable 2 milliGRAM(s) IV Push every 4 hours PRN Moderate Pain (4 - 6)  morphine  - Injectable 4 milliGRAM(s) IV Push every 4 hours PRN Severe Pain (7 - 10)  ondansetron Injectable 4 milliGRAM(s) IV Push every 6 hours PRN Nausea and/or Vomiting

## 2023-04-07 NOTE — PROGRESS NOTE ADULT - SUBJECTIVE AND OBJECTIVE BOX
68y Female w/ PMHx of CAD/NSTEMI s/p 1 stent in LAD 2007 and 1 stent in Ramus 2012, HTN, HLD, cervical CA, GERD, hx recurrent diverticulitis (5 episodes) admitted with acute diverticulitis.    Patient seen at bedside, feeling well. Reports her pain is much improved from yesterday, she is now able to get up OOB and go to the bathroom more comfortably. She is tolerating clears. Denies fevers, chills, nausea or vomiting.     Denies chest pain, dyspnea, cough.    T(F): 97.8 (04-07-23 @ 04:36), Max: 97.8 (04-07-23 @ 04:36)  HR: 73 (04-07-23 @ 04:36) (68 - 80)  BP: 143/82 (04-07-23 @ 04:36) (137/80 - 148/84)  RR: 17 (04-07-23 @ 04:36) (17 - 18)  SpO2: 93% (04-07-23 @ 04:36) (93% - 95%)  Wt(kg): --  CAPILLARY BLOOD GLUCOSE          PHYSICAL EXAM:  General: NAD, WDWN.   Neuro:  Alert & oriented x 3  HEENT: NCAT, EOMI, conjunctiva clear  CV: RR  Lung: NWOB  Abdomen: soft, non-distended. Minimally tender to palpation in RLQ/suprapubic region.   Extremities: no pedal edema or calf tenderness noted   : Voiding spontaneously.     LABS:                        11.8   9.18  )-----------( 288      ( 07 Apr 2023 07:39 )             36.2     04-07    139  |  108  |  4<L>  ----------------------------<  100<H>  3.7   |  25  |  0.60    Ca    9.1      07 Apr 2023 07:39  Phos  1.9     04-07  Mg     2.2     04-07    TPro  7.5  /  Alb  3.0<L>  /  TBili  0.4  /  DBili  x   /  AST  42<H>  /  ALT  72  /  AlkPhos  109  04-07      I&O's Detail      Impression: 68y Female PMHx of CAD/NSTEMI s/p 1 stent in LAD 2007 and 1 stent in Ramus 2012, HTN, HLD, cervical CA, GERD, hx recurrent diverticulitis (5 episodes) admitted with acute diverticulitis. Patient being treated with IV antibiotics, pain improving. VSS, afebrile. Exam with improving tenderness. Labs significant for resolved leukocytosis 9 today. Plan for OR tomorrow.         Plan:  -CLD  -Pre-op for tomorrow, NPO at midnight  -Bowel prep and Jessica's prep today  -continue VTE prophylaxis   -Increase activity with PT, OOB, Ambulate  -Patient instructed on and encouraged incentive spirometry use  -will discuss with Dr. Cardenas

## 2023-04-07 NOTE — DIETITIAN INITIAL EVALUATION ADULT - CONTINUE CURRENT NUTRITION CARE PLAN
continue clear liquids after procedure recommend advance as tolerated clear liquids to low fiber DASH TLC diet as appropriate/yes

## 2023-04-08 ENCOUNTER — TRANSCRIPTION ENCOUNTER (OUTPATIENT)
Age: 68
End: 2023-04-08

## 2023-04-08 DIAGNOSIS — N83.201 UNSPECIFIED OVARIAN CYST, RIGHT SIDE: ICD-10-CM

## 2023-04-08 LAB
ABO RH CONFIRMATION: SIGNIFICANT CHANGE UP
ALBUMIN SERPL ELPH-MCNC: 2.6 G/DL — LOW (ref 3.3–5)
ALBUMIN SERPL ELPH-MCNC: 2.9 G/DL — LOW (ref 3.3–5)
ALP SERPL-CCNC: 107 U/L — SIGNIFICANT CHANGE UP (ref 40–120)
ALP SERPL-CCNC: 120 U/L — SIGNIFICANT CHANGE UP (ref 40–120)
ALT FLD-CCNC: 58 U/L — SIGNIFICANT CHANGE UP (ref 12–78)
ALT FLD-CCNC: 70 U/L — SIGNIFICANT CHANGE UP (ref 12–78)
ANION GAP SERPL CALC-SCNC: 4 MMOL/L — LOW (ref 5–17)
ANION GAP SERPL CALC-SCNC: 6 MMOL/L — SIGNIFICANT CHANGE UP (ref 5–17)
APTT BLD: 28 SEC — SIGNIFICANT CHANGE UP (ref 27.5–35.5)
AST SERPL-CCNC: 31 U/L — SIGNIFICANT CHANGE UP (ref 15–37)
AST SERPL-CCNC: 38 U/L — HIGH (ref 15–37)
BASOPHILS # BLD AUTO: 0.02 K/UL — SIGNIFICANT CHANGE UP (ref 0–0.2)
BASOPHILS NFR BLD AUTO: 0.3 % — SIGNIFICANT CHANGE UP (ref 0–2)
BILIRUB SERPL-MCNC: 0.4 MG/DL — SIGNIFICANT CHANGE UP (ref 0.2–1.2)
BILIRUB SERPL-MCNC: 0.4 MG/DL — SIGNIFICANT CHANGE UP (ref 0.2–1.2)
BUN SERPL-MCNC: 4 MG/DL — LOW (ref 7–23)
BUN SERPL-MCNC: 5 MG/DL — LOW (ref 7–23)
CALCIUM SERPL-MCNC: 8.5 MG/DL — SIGNIFICANT CHANGE UP (ref 8.5–10.1)
CALCIUM SERPL-MCNC: 8.8 MG/DL — SIGNIFICANT CHANGE UP (ref 8.5–10.1)
CHLORIDE SERPL-SCNC: 107 MMOL/L — SIGNIFICANT CHANGE UP (ref 96–108)
CHLORIDE SERPL-SCNC: 107 MMOL/L — SIGNIFICANT CHANGE UP (ref 96–108)
CO2 SERPL-SCNC: 24 MMOL/L — SIGNIFICANT CHANGE UP (ref 22–31)
CO2 SERPL-SCNC: 28 MMOL/L — SIGNIFICANT CHANGE UP (ref 22–31)
CREAT SERPL-MCNC: 0.61 MG/DL — SIGNIFICANT CHANGE UP (ref 0.5–1.3)
CREAT SERPL-MCNC: 0.63 MG/DL — SIGNIFICANT CHANGE UP (ref 0.5–1.3)
EGFR: 97 ML/MIN/1.73M2 — SIGNIFICANT CHANGE UP
EGFR: 97 ML/MIN/1.73M2 — SIGNIFICANT CHANGE UP
EOSINOPHIL # BLD AUTO: 0 K/UL — SIGNIFICANT CHANGE UP (ref 0–0.5)
EOSINOPHIL NFR BLD AUTO: 0 % — SIGNIFICANT CHANGE UP (ref 0–6)
GLUCOSE SERPL-MCNC: 114 MG/DL — HIGH (ref 70–99)
GLUCOSE SERPL-MCNC: 190 MG/DL — HIGH (ref 70–99)
HCT VFR BLD CALC: 32.5 % — LOW (ref 34.5–45)
HCT VFR BLD CALC: 35.8 % — SIGNIFICANT CHANGE UP (ref 34.5–45)
HGB BLD-MCNC: 10.8 G/DL — LOW (ref 11.5–15.5)
HGB BLD-MCNC: 11.7 G/DL — SIGNIFICANT CHANGE UP (ref 11.5–15.5)
IMM GRANULOCYTES NFR BLD AUTO: 0.6 % — SIGNIFICANT CHANGE UP (ref 0–0.9)
INR BLD: 1.11 RATIO — SIGNIFICANT CHANGE UP (ref 0.88–1.16)
LACTATE SERPL-SCNC: 1.4 MMOL/L — SIGNIFICANT CHANGE UP (ref 0.7–2)
LYMPHOCYTES # BLD AUTO: 0.49 K/UL — LOW (ref 1–3.3)
LYMPHOCYTES # BLD AUTO: 6.8 % — LOW (ref 13–44)
MAGNESIUM SERPL-MCNC: 1.8 MG/DL — SIGNIFICANT CHANGE UP (ref 1.6–2.6)
MAGNESIUM SERPL-MCNC: 1.8 MG/DL — SIGNIFICANT CHANGE UP (ref 1.6–2.6)
MCHC RBC-ENTMCNC: 29.2 PG — SIGNIFICANT CHANGE UP (ref 27–34)
MCHC RBC-ENTMCNC: 29.3 PG — SIGNIFICANT CHANGE UP (ref 27–34)
MCHC RBC-ENTMCNC: 32.7 GM/DL — SIGNIFICANT CHANGE UP (ref 32–36)
MCHC RBC-ENTMCNC: 33.2 GM/DL — SIGNIFICANT CHANGE UP (ref 32–36)
MCV RBC AUTO: 87.8 FL — SIGNIFICANT CHANGE UP (ref 80–100)
MCV RBC AUTO: 89.5 FL — SIGNIFICANT CHANGE UP (ref 80–100)
MONOCYTES # BLD AUTO: 0.19 K/UL — SIGNIFICANT CHANGE UP (ref 0–0.9)
MONOCYTES NFR BLD AUTO: 2.6 % — SIGNIFICANT CHANGE UP (ref 2–14)
NEUTROPHILS # BLD AUTO: 6.45 K/UL — SIGNIFICANT CHANGE UP (ref 1.8–7.4)
NEUTROPHILS NFR BLD AUTO: 89.7 % — HIGH (ref 43–77)
NRBC # BLD: 0 /100 WBCS — SIGNIFICANT CHANGE UP (ref 0–0)
NRBC # BLD: 0 /100 WBCS — SIGNIFICANT CHANGE UP (ref 0–0)
PHOSPHATE SERPL-MCNC: 3.2 MG/DL — SIGNIFICANT CHANGE UP (ref 2.5–4.5)
PHOSPHATE SERPL-MCNC: 3.4 MG/DL — SIGNIFICANT CHANGE UP (ref 2.5–4.5)
PLATELET # BLD AUTO: 283 K/UL — SIGNIFICANT CHANGE UP (ref 150–400)
PLATELET # BLD AUTO: 294 K/UL — SIGNIFICANT CHANGE UP (ref 150–400)
POTASSIUM SERPL-MCNC: 3.3 MMOL/L — LOW (ref 3.5–5.3)
POTASSIUM SERPL-MCNC: 3.5 MMOL/L — SIGNIFICANT CHANGE UP (ref 3.5–5.3)
POTASSIUM SERPL-SCNC: 3.3 MMOL/L — LOW (ref 3.5–5.3)
POTASSIUM SERPL-SCNC: 3.5 MMOL/L — SIGNIFICANT CHANGE UP (ref 3.5–5.3)
PROCALCITONIN SERPL-MCNC: 1.14 NG/ML — HIGH
PROT SERPL-MCNC: 6.7 G/DL — SIGNIFICANT CHANGE UP (ref 6–8.3)
PROT SERPL-MCNC: 7.2 G/DL — SIGNIFICANT CHANGE UP (ref 6–8.3)
PROTHROM AB SERPL-ACNC: 13 SEC — SIGNIFICANT CHANGE UP (ref 10.5–13.4)
RBC # BLD: 3.7 M/UL — LOW (ref 3.8–5.2)
RBC # BLD: 4 M/UL — SIGNIFICANT CHANGE UP (ref 3.8–5.2)
RBC # FLD: 12.7 % — SIGNIFICANT CHANGE UP (ref 10.3–14.5)
RBC # FLD: 12.8 % — SIGNIFICANT CHANGE UP (ref 10.3–14.5)
SODIUM SERPL-SCNC: 137 MMOL/L — SIGNIFICANT CHANGE UP (ref 135–145)
SODIUM SERPL-SCNC: 139 MMOL/L — SIGNIFICANT CHANGE UP (ref 135–145)
WBC # BLD: 7.19 K/UL — SIGNIFICANT CHANGE UP (ref 3.8–10.5)
WBC # BLD: 8.64 K/UL — SIGNIFICANT CHANGE UP (ref 3.8–10.5)
WBC # FLD AUTO: 7.19 K/UL — SIGNIFICANT CHANGE UP (ref 3.8–10.5)
WBC # FLD AUTO: 8.64 K/UL — SIGNIFICANT CHANGE UP (ref 3.8–10.5)

## 2023-04-08 PROCEDURE — 44207 L COLECTOMY/COLOPROCTOSTOMY: CPT | Mod: 80

## 2023-04-08 PROCEDURE — 49322 LAPAROSCOPY ASPIRATION: CPT | Mod: 80,59

## 2023-04-08 PROCEDURE — 88305 TISSUE EXAM BY PATHOLOGIST: CPT | Mod: 26

## 2023-04-08 PROCEDURE — 88304 TISSUE EXAM BY PATHOLOGIST: CPT | Mod: 26

## 2023-04-08 PROCEDURE — 88108 CYTOPATH CONCENTRATE TECH: CPT | Mod: 26

## 2023-04-08 PROCEDURE — 44213 LAP MOBIL SPLENIC FL ADD-ON: CPT

## 2023-04-08 PROCEDURE — 99232 SBSQ HOSP IP/OBS MODERATE 35: CPT

## 2023-04-08 PROCEDURE — 44213 LAP MOBIL SPLENIC FL ADD-ON: CPT | Mod: 80

## 2023-04-08 PROCEDURE — 88307 TISSUE EXAM BY PATHOLOGIST: CPT | Mod: 26

## 2023-04-08 PROCEDURE — 44207 L COLECTOMY/COLOPROCTOSTOMY: CPT | Mod: 22

## 2023-04-08 DEVICE — CLIP APPLIER COVIDIEN ENDOCLIP II 10MM MED/LG: Type: IMPLANTABLE DEVICE | Status: FUNCTIONAL

## 2023-04-08 DEVICE — STAPLER COVIDIEN EEA CIRCULAR DST 28MM 4.5MM BLUE: Type: IMPLANTABLE DEVICE | Status: FUNCTIONAL

## 2023-04-08 DEVICE — STAPLER COVIDIEN TRI-STAPLE 60MM PURPLE RELOAD: Type: IMPLANTABLE DEVICE | Status: FUNCTIONAL

## 2023-04-08 DEVICE — STAPLER COVIDIEN TRI-STAPLE CURVED 60MM TAN RELOAD: Type: IMPLANTABLE DEVICE | Status: FUNCTIONAL

## 2023-04-08 RX ORDER — FLUTICASONE PROPIONATE 50 MCG
1 SPRAY, SUSPENSION NASAL
Refills: 0 | Status: DISCONTINUED | OUTPATIENT
Start: 2023-04-08 | End: 2023-04-11

## 2023-04-08 RX ORDER — HYDROMORPHONE HYDROCHLORIDE 2 MG/ML
0.25 INJECTION INTRAMUSCULAR; INTRAVENOUS; SUBCUTANEOUS ONCE
Refills: 0 | Status: DISCONTINUED | OUTPATIENT
Start: 2023-04-08 | End: 2023-04-08

## 2023-04-08 RX ORDER — ONDANSETRON 8 MG/1
4 TABLET, FILM COATED ORAL ONCE
Refills: 0 | Status: DISCONTINUED | OUTPATIENT
Start: 2023-04-08 | End: 2023-04-08

## 2023-04-08 RX ORDER — POLYETHYLENE GLYCOL 3350 17 G/17G
17 POWDER, FOR SOLUTION ORAL DAILY
Refills: 0 | Status: DISCONTINUED | OUTPATIENT
Start: 2023-04-08 | End: 2023-04-11

## 2023-04-08 RX ORDER — ACETAMINOPHEN 500 MG
1000 TABLET ORAL ONCE
Refills: 0 | Status: COMPLETED | OUTPATIENT
Start: 2023-04-09 | End: 2023-04-09

## 2023-04-08 RX ORDER — PIPERACILLIN AND TAZOBACTAM 4; .5 G/20ML; G/20ML
3.38 INJECTION, POWDER, LYOPHILIZED, FOR SOLUTION INTRAVENOUS EVERY 8 HOURS
Refills: 0 | Status: DISCONTINUED | OUTPATIENT
Start: 2023-04-08 | End: 2023-04-11

## 2023-04-08 RX ORDER — ACETAMINOPHEN 500 MG
1000 TABLET ORAL ONCE
Refills: 0 | Status: COMPLETED | OUTPATIENT
Start: 2023-04-09 | End: 2023-04-08

## 2023-04-08 RX ORDER — ACETAMINOPHEN 500 MG
1000 TABLET ORAL ONCE
Refills: 0 | Status: DISCONTINUED | OUTPATIENT
Start: 2023-04-08 | End: 2023-04-08

## 2023-04-08 RX ORDER — KETOROLAC TROMETHAMINE 30 MG/ML
15 SYRINGE (ML) INJECTION EVERY 6 HOURS
Refills: 0 | Status: DISCONTINUED | OUTPATIENT
Start: 2023-04-08 | End: 2023-04-09

## 2023-04-08 RX ORDER — CHLORHEXIDINE GLUCONATE 213 G/1000ML
1 SOLUTION TOPICAL
Refills: 0 | Status: DISCONTINUED | OUTPATIENT
Start: 2023-04-08 | End: 2023-04-09

## 2023-04-08 RX ORDER — LOSARTAN POTASSIUM 100 MG/1
50 TABLET, FILM COATED ORAL DAILY
Refills: 0 | Status: DISCONTINUED | OUTPATIENT
Start: 2023-04-08 | End: 2023-04-08

## 2023-04-08 RX ORDER — SODIUM CHLORIDE 9 MG/ML
1000 INJECTION, SOLUTION INTRAVENOUS
Refills: 0 | Status: DISCONTINUED | OUTPATIENT
Start: 2023-04-08 | End: 2023-04-08

## 2023-04-08 RX ORDER — ACETAMINOPHEN 500 MG
1000 TABLET ORAL ONCE
Refills: 0 | Status: COMPLETED | OUTPATIENT
Start: 2023-04-10 | End: 2023-04-09

## 2023-04-08 RX ORDER — SODIUM CHLORIDE 9 MG/ML
1000 INJECTION, SOLUTION INTRAVENOUS ONCE
Refills: 0 | Status: COMPLETED | OUTPATIENT
Start: 2023-04-08 | End: 2023-04-08

## 2023-04-08 RX ORDER — ACETAMINOPHEN 500 MG
1000 TABLET ORAL ONCE
Refills: 0 | Status: COMPLETED | OUTPATIENT
Start: 2023-04-08 | End: 2023-04-08

## 2023-04-08 RX ORDER — PANTOPRAZOLE SODIUM 20 MG/1
40 TABLET, DELAYED RELEASE ORAL
Refills: 0 | Status: DISCONTINUED | OUTPATIENT
Start: 2023-04-08 | End: 2023-04-11

## 2023-04-08 RX ORDER — HYDROMORPHONE HYDROCHLORIDE 2 MG/ML
0.5 INJECTION INTRAMUSCULAR; INTRAVENOUS; SUBCUTANEOUS
Refills: 0 | Status: DISCONTINUED | OUTPATIENT
Start: 2023-04-08 | End: 2023-04-08

## 2023-04-08 RX ORDER — POTASSIUM CHLORIDE 20 MEQ
40 PACKET (EA) ORAL ONCE
Refills: 0 | Status: COMPLETED | OUTPATIENT
Start: 2023-04-08 | End: 2023-04-08

## 2023-04-08 RX ORDER — HYDROMORPHONE HYDROCHLORIDE 2 MG/ML
0.5 INJECTION INTRAMUSCULAR; INTRAVENOUS; SUBCUTANEOUS EVERY 4 HOURS
Refills: 0 | Status: DISCONTINUED | OUTPATIENT
Start: 2023-04-08 | End: 2023-04-09

## 2023-04-08 RX ORDER — SIMVASTATIN 20 MG/1
20 TABLET, FILM COATED ORAL AT BEDTIME
Refills: 0 | Status: DISCONTINUED | OUTPATIENT
Start: 2023-04-08 | End: 2023-04-11

## 2023-04-08 RX ORDER — MONTELUKAST 4 MG/1
10 TABLET, CHEWABLE ORAL DAILY
Refills: 0 | Status: DISCONTINUED | OUTPATIENT
Start: 2023-04-08 | End: 2023-04-11

## 2023-04-08 RX ORDER — ONDANSETRON 8 MG/1
4 TABLET, FILM COATED ORAL EVERY 6 HOURS
Refills: 0 | Status: DISCONTINUED | OUTPATIENT
Start: 2023-04-08 | End: 2023-04-11

## 2023-04-08 RX ORDER — OXYCODONE HYDROCHLORIDE 5 MG/1
5 TABLET ORAL EVERY 4 HOURS
Refills: 0 | Status: DISCONTINUED | OUTPATIENT
Start: 2023-04-08 | End: 2023-04-11

## 2023-04-08 RX ORDER — OXYCODONE HYDROCHLORIDE 5 MG/1
10 TABLET ORAL EVERY 4 HOURS
Refills: 0 | Status: DISCONTINUED | OUTPATIENT
Start: 2023-04-08 | End: 2023-04-11

## 2023-04-08 RX ORDER — METOPROLOL TARTRATE 50 MG
50 TABLET ORAL DAILY
Refills: 0 | Status: DISCONTINUED | OUTPATIENT
Start: 2023-04-08 | End: 2023-04-08

## 2023-04-08 RX ADMIN — PIPERACILLIN AND TAZOBACTAM 25 GRAM(S): 4; .5 INJECTION, POWDER, LYOPHILIZED, FOR SOLUTION INTRAVENOUS at 14:52

## 2023-04-08 RX ADMIN — LOSARTAN POTASSIUM 50 MILLIGRAM(S): 100 TABLET, FILM COATED ORAL at 05:31

## 2023-04-08 RX ADMIN — Medication 15 MILLIGRAM(S): at 23:03

## 2023-04-08 RX ADMIN — PIPERACILLIN AND TAZOBACTAM 25 GRAM(S): 4; .5 INJECTION, POWDER, LYOPHILIZED, FOR SOLUTION INTRAVENOUS at 21:36

## 2023-04-08 RX ADMIN — SODIUM CHLORIDE 100 MILLILITER(S): 9 INJECTION, SOLUTION INTRAVENOUS at 11:52

## 2023-04-08 RX ADMIN — SODIUM CHLORIDE 1000 MILLILITER(S): 9 INJECTION, SOLUTION INTRAVENOUS at 18:12

## 2023-04-08 RX ADMIN — Medication 15 MILLIGRAM(S): at 23:15

## 2023-04-08 RX ADMIN — PIPERACILLIN AND TAZOBACTAM 25 GRAM(S): 4; .5 INJECTION, POWDER, LYOPHILIZED, FOR SOLUTION INTRAVENOUS at 05:31

## 2023-04-08 RX ADMIN — Medication 400 MILLIGRAM(S): at 16:32

## 2023-04-08 RX ADMIN — Medication 15 MILLIGRAM(S): at 16:32

## 2023-04-08 RX ADMIN — Medication 400 MILLIGRAM(S): at 23:03

## 2023-04-08 RX ADMIN — SIMVASTATIN 20 MILLIGRAM(S): 20 TABLET, FILM COATED ORAL at 21:31

## 2023-04-08 RX ADMIN — Medication 50 MILLIGRAM(S): at 05:31

## 2023-04-08 RX ADMIN — Medication 1000 MILLIGRAM(S): at 23:30

## 2023-04-08 RX ADMIN — Medication 400 MILLIGRAM(S): at 11:52

## 2023-04-08 RX ADMIN — Medication 40 MILLIEQUIVALENT(S): at 21:31

## 2023-04-08 NOTE — BRIEF OPERATIVE NOTE - NSICDXBRIEFPROCEDURE_GEN_ALL_CORE_FT
PROCEDURES:  Laparoscopic low anterior resection 08-Apr-2023 11:57:07  Wood Bedoya  Mobilization of splenic flexure 08-Apr-2023 11:57:29  Wood Bedoya  Laparoscopic drainage of pelvic abscess 08-Apr-2023 11:57:42  Wood Bedoya  
PROCEDURES:  Laparoscopic bilateral salpingo-oophorectomy 08-Apr-2023 10:39:35  Dante Ho

## 2023-04-08 NOTE — DISCHARGE NOTE PROVIDER - PROVIDER TOKENS
PROVIDER:[TOKEN:[[id number]]] PROVIDER:[TOKEN:[[id number]]],PROVIDER:[TOKEN:[[id number]],FOLLOWUP:[2 weeks],ESTABLISHEDPATIENT:[T]]

## 2023-04-08 NOTE — CONSULT NOTE ADULT - ASSESSMENT
68F with CAD s/p MEGAN x2 in 2007, 5 episodes of diverticulitis in the past treated with IV antibiotics who presented on 4/4/23 with acute onset abdominal pain found to be recurrent acute diverticulitis with imaging revealing microperforation, started on empiric zosyn. Now POD #0 s/p laparoscopic low anterior resection with drainage of incidentally found pelvic abscess and POD #0 s/p scheduled laparoscopic BSO for 6cm L ovarian and 8cm R ovarian cysts. Postoperatively patient was noted to be hypotensive to SBPs 90s, baseline per patient 140-160s. MICU consulted for hypotension, possible initiation of vasopressor therapy.    Hypotension, unknown etiology - possibly s/t sedation vs interoperative bacterial seeding from pelvic abscess  Acute Diverticulitis s/p laparoscopic low anterior resection with drainage of pelvic abscess  Bilateral Ovarian Cysts s/p laparoscopic BSO  CAD s/p 2 MEGAN    Plan:  NEURO: Mentation intact. PRN Pain Regimen ordered.  CARDIAC:   - Actively resuscitating with 1L LR bolus to assess fluid responsiveness. Will start levophed as needed to maintain goal MAP >65. Actively searching for etiology of hypotension: likely distributive from sedation vs budding infection from bacterial seeding s/t pelvic abscess manipulation. If requiring pressors will order TTE.   - Holding home losartan and metoprolol while hypotensive, restart when HDS.   - Continue home lipitor, holding DAPT iso recent instrumentation, restart when cleared by surgery.  RESPIRATORY: Satting high 90s on RA. Goal SpO2 >92%.  GI: Clear liquid diet, advance diet as per surgery.  : Continue to trend Cr and monitor I&Os. Goal K>4, Mg>2, P>3.  ENDO: No active issues. Goal -180.  ID: Afebrile, WBC remained WNL prior to surgery, follow up repeat CBC w/ diff and lactate. Not currently meeting SIRS criteria. However, concern for possible bacterial seeding intraoperatively given evacuation of pelvic abscess. STAT blood cxs ordered. Continue empiric Zosyn, narrow abx when appropriate. ID following.  HEME: Holding chemical DVT ppx as per surgery, SCDs for now.    Dispo: Admit to ICU for IVF resuscitation and possible initiation of vasopressor therapy.    Case discussed with ICU Attending, Dr Curran, and General Surgery team. 68F with CAD s/p MEGAN x2 in 2007, 5 episodes of diverticulitis in the past treated with IV antibiotics who presented on 4/4/23 with acute onset abdominal pain found to be recurrent acute diverticulitis with imaging revealing microperforation, started on empiric zosyn. Now POD #0 s/p laparoscopic low anterior resection, mobilization of splenic flexure, drainage of a pelvic abscess & b/l salpingo-oophorectomy of 6cm L ovarian and 8cm R ovarian cysts. for 6cm L ovarian and 8cm R ovarian cysts. Postoperatively patient was noted to be hypotensive to SBPs 90s, baseline per patient 140-160s. MICU consulted for hypotension, possible initiation of vasopressor therapy.    Hypotension, unknown etiology - possibly s/t sedation vs interoperative bacterial seeding from pelvic abscess  Acute Diverticulitis s/p laparoscopic low anterior resection with drainage of pelvic abscess  Bilateral Ovarian Cysts s/p laparoscopic BSO  CAD s/p 2 MEGAN    Plan:  NEURO: Mentation intact. PRN Pain Regimen ordered.  CARDIAC:   - Actively resuscitating with 1L LR bolus to assess fluid responsiveness. Will start levophed as needed to maintain goal MAP >65. Actively searching for etiology of hypotension: likely distributive from sedation vs budding infection from bacterial seeding s/t pelvic abscess manipulation. If requiring pressors will order TTE.   - Holding home losartan and metoprolol while hypotensive, restart when HDS.   - Continue home lipitor, holding DAPT iso recent instrumentation, restart when cleared by surgery.  RESPIRATORY: Satting high 90s on RA. Goal SpO2 >92%.  GI: Clear liquid diet, advance diet as per surgery.  : Continue to trend Cr and monitor I&Os. Goal K>4, Mg>2, P>3.  ENDO: No active issues. Goal -180.  ID: Afebrile, WBC remained WNL prior to surgery, follow up repeat CBC w/ diff and lactate. Not currently meeting SIRS criteria. However, concern for possible bacterial seeding intraoperatively given evacuation of pelvic abscess. STAT blood cxs ordered. Continue empiric Zosyn, narrow abx when appropriate. ID following.  HEME: Holding chemical DVT ppx as per surgery, SCDs for now.    Dispo: Admit to ICU for IVF resuscitation and possible initiation of vasopressor therapy.    Case discussed with ICU Attending, Dr Curran, and General Surgery team. 68F with CAD s/p MEGAN x2 in 2007, 5 episodes of diverticulitis in the past treated with IV antibiotics who presented on 4/4/23 with acute onset abdominal pain found to be recurrent acute diverticulitis with imaging revealing microperforation, started on empiric zosyn. Now POD #0 s/p laparoscopic low anterior resection, mobilization of splenic flexure, drainage of a pelvic abscess & b/l salpingo-oophorectomy of 6cm L ovarian and 8cm R ovarian cysts. for 6cm L ovarian and 8cm R ovarian cysts. Postoperatively patient was noted to be hypotensive to SBPs 90s, baseline per patient 140-160s. MICU consulted for hypotension, possible initiation of vasopressor therapy.    Hypotension, unknown etiology - possibly s/t sedation vs intraoperative bacterial seeding from pelvic abscess  Acute Diverticulitis s/p laparoscopic low anterior resection with drainage of pelvic abscess  Bilateral Ovarian Cysts s/p laparoscopic BSO  CAD s/p 2 MEGAN    Plan:  NEURO: Mentation intact. PRN Pain Regimen ordered.  CARDIAC:   - Actively resuscitating with 1L LR bolus to assess fluid responsiveness. Will start levophed as needed to maintain goal MAP >65. Actively searching for etiology of hypotension: likely distributive from sedation vs budding infection from bacterial seeding s/t pelvic abscess manipulation. If requiring pressors will order TTE.   - Holding home losartan and metoprolol while hypotensive, restart when HDS.   - Continue home lipitor, holding DAPT iso recent instrumentation, restart when cleared by surgery.  RESPIRATORY: Satting high 90s on RA. Goal SpO2 >92%.  GI: Clear liquid diet, advance diet as per surgery.  : Continue to trend Cr and monitor I&Os. Goal K>4, Mg>2, P>3.  ENDO: No active issues. Goal -180.  ID: Afebrile, WBC remained WNL prior to surgery, follow up repeat CBC w/ diff and lactate. Not currently meeting SIRS criteria. However, concern for possible bacterial seeding intraoperatively given evacuation of pelvic abscess. STAT blood cxs ordered. Continue empiric Zosyn, narrow abx when appropriate. ID following.  HEME: Holding chemical DVT ppx as per surgery, SCDs for now.    Dispo: Admit to ICU for IVF resuscitation and possible initiation of vasopressor therapy.    Case discussed with ICU Attending, Dr Curran, and General Surgery team. 68F with CAD s/p MEGAN x2 in 2007, 5 episodes of diverticulitis in the past treated with IV antibiotics who presented on 4/4/23 with acute onset abdominal pain found to be recurrent acute diverticulitis with imaging revealing microperforation, started on empiric zosyn. Now POD #0 s/p laparoscopic low anterior resection, mobilization of splenic flexure, drainage of a pelvic abscess & b/l salpingo-oophorectomy of 6cm L ovarian and 8cm R ovarian cysts. for 6cm L ovarian and 8cm R ovarian cysts. Postoperatively patient was noted to be hypotensive to SBPs 90s, baseline per patient 140-160s. MICU consulted for hypotension, possible initiation of vasopressor therapy.    Hypotension, R/O Undifferentiated Shock - possibly s/t sedation vs intraoperative bacterial seeding from pelvic abscess  Acute Diverticulitis s/p laparoscopic low anterior resection with drainage of pelvic abscess  Bilateral Ovarian Cysts s/p laparoscopic BSO  CAD s/p 2 MEGAN    Plan:  NEURO: Mentation intact. PRN Pain Regimen ordered.  CARDIAC:   - Actively resuscitating with 1L LR bolus to assess fluid responsiveness. Will start levophed as needed to maintain goal MAP >65. Actively searching for etiology of hypotension: likely distributive from sedation vs budding infection from bacterial seeding s/t pelvic abscess manipulation. If requiring pressors will order TTE.   - Holding home losartan and metoprolol while hypotensive, restart when HDS.   - Continue home lipitor, holding DAPT iso recent instrumentation, restart when cleared by surgery.  RESPIRATORY: Satting high 90s on RA. Goal SpO2 >92%.  GI: Clear liquid diet, advance diet as per surgery.  : Continue to trend Cr and monitor I&Os. Goal K>4, Mg>2, P>3.  ENDO: No active issues. Goal -180.  ID: Afebrile, WBC remained WNL prior to surgery, follow up repeat CBC w/ diff and lactate. Not currently meeting SIRS criteria. However, concern for possible bacterial seeding intraoperatively given evacuation of pelvic abscess. STAT blood cxs ordered. Continue empiric Zosyn, narrow abx when appropriate. ID following.  HEME: Holding chemical DVT ppx as per surgery, SCDs for now.    Dispo: Admit to ICU for IVF resuscitation and possible initiation of vasopressor therapy.    Case discussed with ICU Attending, Dr Curran, and General Surgery team. 68F with CAD s/p MEGAN x2 in 2007, 5 episodes of diverticulitis in the past treated with IV antibiotics who presented on 4/4/23 with acute onset abdominal pain found to be recurrent acute diverticulitis with imaging revealing microperforation, started on empiric zosyn. Now POD #0 s/p laparoscopic low anterior resection, mobilization of splenic flexure, drainage of a pelvic abscess & b/l salpingo-oophorectomy of 6cm L ovarian and 8cm R ovarian cysts. for 6cm L ovarian and 8cm R ovarian cysts. Postoperatively patient was noted to be hypotensive to SBPs 90s, baseline per patient 140-160s. MICU consulted for hypotension, possible initiation of vasopressor therapy.    Hypotension, R/O Undifferentiated Shock - possibly s/t sedation vs intraoperative bacterial seeding from pelvic abscess  Acute Diverticulitis s/p laparoscopic low anterior resection with drainage of pelvic abscess  Bilateral Ovarian Cysts s/p laparoscopic BSO  CAD s/p 2 MEGAN    Plan:  NEURO: Mentation intact. PRN Pain Regimen ordered.  CARDIAC:   - Actively resuscitating with 1L LR bolus to assess fluid responsiveness. Will start levophed as needed to maintain goal MAP >65. Actively searching for etiology of hypotension: likely distributive from sedation vs budding infection from bacterial seeding s/t pelvic abscess manipulation. If requiring pressors will order TTE.   - Holding home losartan and metoprolol while hypotensive, restart when HDS.   - Continue home lipitor, holding DAPT iso recent instrumentation, restart when cleared by surgery.  RESPIRATORY: Satting high 90s on RA. Goal SpO2 >92%.  GI: Clear liquid diet, advance diet as per surgery.  : Continue to trend Cr and monitor I&Os. Goal K>4, Mg>2, P>3.  ENDO: No active issues. Goal -180.  ID: Afebrile, WBC remained WNL prior to surgery, follow up pending CBC w/ diff and lactate. Not currently meeting SIRS criteria. However, concern for possible bacterial seeding intraoperatively given evacuation of pelvic abscess. STAT blood cxs ordered. Continue empiric Zosyn, narrow abx when appropriate. ID following.  HEME: Holding chemical DVT ppx as per surgery, SCDs for now.    Dispo: Admit to ICU for IVF resuscitation and possible initiation of vasopressor therapy.    Case discussed with ICU Attending, Dr Curran, and General Surgery team.

## 2023-04-08 NOTE — PROGRESS NOTE ADULT - SUBJECTIVE AND OBJECTIVE BOX
POST OPERATIVE NOTE  Patient: LUMA LANIER 68y (1955) Female   MRN: [id number]  Visit: 04-04-23 Inpatient  Date: 04-08-23 @ 17:53    Procedure: S/P lap low anterior resection, mobilization of splenic flexure, drainage of a pelvic abscess & b/l salpingo-oophorectomy.    Subjective: Patient seen and examined at bedside. Pt has no complaints at this time besides for feeling sleepy, very mild post-operative pain. No lightheadedness or dizziness, no chest pain, shortness of breath, nausea, vomiting. Tristan in place.    Objective:  Vitals: T(F): 99 (04-08-23 @ 12:51), Max: 100.2 (04-08-23 @ 05:21)  HR: 69 (04-08-23 @ 12:51)  BP: 98/61 (04-08-23 @ 12:51) (92/43 - 160/83)  RR: 18 (04-08-23 @ 12:51)  SpO2: 91% (04-08-23 @ 12:51)  Vent Settings:     In:   04-07-23 @ 07:01  -  04-08-23 @ 07:00  --------------------------------------------------------  IN: 775 mL    04-08-23 @ 07:01  -  04-08-23 @ 17:53  --------------------------------------------------------  IN: 100 mL      IV Fluids:     Out:   04-07-23 @ 07:01  -  04-08-23 @ 07:00  --------------------------------------------------------  OUT: 0 mL    04-08-23 @ 07:01 - 04-08-23 @ 17:53  --------------------------------------------------------  OUT: 0 mL    Physical Exam:  GENERAL: NAD, resting comfortably in bed  HEENT:  Atraumatic, normocephalic; conjunctiva and sclera clear; neck supple  CHEST/LUNG: CTA B/L, good inspiratory effort  HEART: Rate & rhythm regular; +S1/S2  ABDOMEN: Soft, minimally distended, mildly tender to palpation, surgical incision intact w/ dermabond, mild bruising noted around central incision  EXTREMITIES:  2+ peripheral pulses, no clubbing, cyanosis, or edema  PSYCH: A&O x3  NEUROLOGY: Motor & sensory grossly intact  SKIN: Warm & dry    Medications: [Standing]  aluminum hydroxide/magnesium hydroxide/simethicone Suspension 30 milliLiter(s) Oral every 4 hours PRN  chlorhexidine 4% Liquid 1 Application(s) Topical <User Schedule>  fluticasone propionate 50 MICROgram(s)/spray Nasal Spray 1 Spray(s) Both Nostrils <User Schedule>  HYDROmorphone  Injectable 0.25 milliGRAM(s) IV Push once PRN  HYDROmorphone  Injectable 0.5 milliGRAM(s) IV Push every 10 minutes PRN  ketorolac   Injectable 15 milliGRAM(s) IV Push every 6 hours  losartan 50 milliGRAM(s) Oral daily  metoprolol succinate ER 50 milliGRAM(s) Oral daily  montelukast 10 milliGRAM(s) Oral daily  ondansetron Injectable 4 milliGRAM(s) IV Push every 6 hours PRN  oxyCODONE    IR 5 milliGRAM(s) Oral every 4 hours PRN  oxyCODONE    IR 10 milliGRAM(s) Oral every 4 hours PRN  pantoprazole    Tablet 40 milliGRAM(s) Oral before breakfast  piperacillin/tazobactam IVPB.. 3.375 Gram(s) IV Intermittent every 8 hours  polyethylene glycol 3350 17 Gram(s) Oral daily  simvastatin 20 milliGRAM(s) Oral at bedtime    Medications: [PRN]  aluminum hydroxide/magnesium hydroxide/simethicone Suspension 30 milliLiter(s) Oral every 4 hours PRN  chlorhexidine 4% Liquid 1 Application(s) Topical <User Schedule>  fluticasone propionate 50 MICROgram(s)/spray Nasal Spray 1 Spray(s) Both Nostrils <User Schedule>  HYDROmorphone  Injectable 0.25 milliGRAM(s) IV Push once PRN  HYDROmorphone  Injectable 0.5 milliGRAM(s) IV Push every 10 minutes PRN  ketorolac   Injectable 15 milliGRAM(s) IV Push every 6 hours  losartan 50 milliGRAM(s) Oral daily  metoprolol succinate ER 50 milliGRAM(s) Oral daily  montelukast 10 milliGRAM(s) Oral daily  ondansetron Injectable 4 milliGRAM(s) IV Push every 6 hours PRN  oxyCODONE    IR 5 milliGRAM(s) Oral every 4 hours PRN  oxyCODONE    IR 10 milliGRAM(s) Oral every 4 hours PRN  pantoprazole    Tablet 40 milliGRAM(s) Oral before breakfast  piperacillin/tazobactam IVPB.. 3.375 Gram(s) IV Intermittent every 8 hours  polyethylene glycol 3350 17 Gram(s) Oral daily  simvastatin 20 milliGRAM(s) Oral at bedtime      Labs:                        11.7   8.64  )-----------( 294      ( 08 Apr 2023 06:10 )             35.8     04-08    139  |  107  |  4<L>  ----------------------------<  114<H>  3.5   |  28  |  0.63    Ca    8.8      08 Apr 2023 06:10  Phos  3.2     04-08  Mg     1.8     04-08    TPro  7.2  /  Alb  2.9<L>  /  TBili  0.4  /  DBili  x   /  AST  38<H>  /  ALT  70  /  AlkPhos  120  04-08    PT/INR - ( 08 Apr 2023 06:10 )   PT: 13.0 sec;   INR: 1.11 ratio   PTT - ( 08 Apr 2023 06:10 )  PTT:28.0 sec    Imaging:  No post-op imaging studies

## 2023-04-08 NOTE — DISCHARGE NOTE PROVIDER - NSDCMRMEDTOKEN_GEN_ALL_CORE_FT
acetaminophen 325 mg oral tablet: 2 tab(s) orally every 6 hours, As needed for pain.   amoxicillin-clavulanate 875 mg-125 mg oral tablet: 1 tab(s) orally every 12 hours   Aspir 81 oral delayed release tablet: 1 tab(s) orally once a day AM  biotin 1000 mcg oral tablet: 1 tab(s) orally once a day AM  Calcium 600+D oral tablet: orally once a day  Night  Co Q-10: 200 milligram(s) orally once a day  Cozaar 50 mg oral tablet: 1 tab(s) orally once a day  Fish Oil 1200 mg oral capsule: orally once a day AM  Flonase 50 mcg/inh nasal spray: 1 spray(s) nasal once a day  Multi Vitamin+: 1 tab(s) orally once a day AM  Pepcid 20 mg oral tablet: 1 tab(s) orally 2 times a day  Plavix 75 mg oral tablet: 1 tab(s) orally once a day AM  Probiotic Formula oral capsule: 1 cap(s) orally once a day  simvastatin 20 mg oral tablet: 1 tab(s) orally once a day (at bedtime)  Singulair 10 mg oral tablet: 1 tab(s) orally once a day AM  Toprol-XL 50 mg oral tablet, extended release: 1 tab(s) orally once (at bedtime)  Vitamin C 500 mg oral tablet: 1 tab(s) orally once a day AM  Zetia 10 mg oral tablet: 1 tab(s) orally once a day pm   acetaminophen 325 mg oral tablet: 2 tab(s) orally every 6 hours, As needed for pain.   acetaminophen 500 mg oral tablet: 2 tab(s) orally  amoxicillin-clavulanate 875 mg-125 mg oral tablet: 1 tab(s) orally 2 times a day  Aspir 81 oral delayed release tablet: 1 tab(s) orally once a day AM  biotin 1000 mcg oral tablet: 1 tab(s) orally once a day AM  Calcium 600+D oral tablet: orally once a day  Night  Co Q-10: 200 milligram(s) orally once a day  Cozaar 50 mg oral tablet: 1 tab(s) orally once a day  Fish Oil 1200 mg oral capsule: orally once a day AM  Flonase 50 mcg/inh nasal spray: 1 spray(s) nasal once a day  ibuprofen 600 mg oral tablet: 1 tab(s) orally every 8 hours  Multi Vitamin+: 1 tab(s) orally once a day AM  oxyCODONE 5 mg oral tablet: 1 tab(s) orally MDD: 4  Pepcid 20 mg oral tablet: 1 tab(s) orally 2 times a day  Plavix 75 mg oral tablet: 1 tab(s) orally once a day AM  Probiotic Formula oral capsule: 1 cap(s) orally once a day  simvastatin 20 mg oral tablet: 1 tab(s) orally once a day (at bedtime)  Singulair 10 mg oral tablet: 1 tab(s) orally once a day AM  Toprol-XL 50 mg oral tablet, extended release: 1 tab(s) orally once (at bedtime)  Vitamin C 500 mg oral tablet: 1 tab(s) orally once a day AM  Zetia 10 mg oral tablet: 1 tab(s) orally once a day pm

## 2023-04-08 NOTE — DISCHARGE NOTE PROVIDER - CARE PROVIDER_API CALL
Dante Ho)  Obstetrics and Gynecology  [address] [place] [address], [address]  [place] [address]  Phone: [phone]  Fax: [phone]  Follow Up Time:    Dante Ho)  Obstetrics and Gynecology  [address] [facility], [address]  [place] [address]  Phone: [phone]  Fax: [phone]  Follow Up Time:     Deshawn Cardenas)  Surgery  [address] [place] [address]  Phone: [phone]  Fax: [phone]  Established Patient  Follow Up Time: 2 weeks

## 2023-04-08 NOTE — DISCHARGE NOTE PROVIDER - NSDCCAREPROVSEEN_GEN_ALL_CORE_FT
Deshawn Cardenas Deshawn Cardenas  Addendum.   Pt. in the recovery was noted to be hypotensive. This was most likely due to low grade sepsis, fluid under ressuscitation , blood loss and effects of anesthesia . This however required close observation and treatment with fluids.

## 2023-04-08 NOTE — PROGRESS NOTE ADULT - ASSESSMENT
68y Female patient S/P lap low anterior resection, mobilization of splenic flexure, drainage of a pelvic abscess & b/l salpingo-oophorectomy. Seen for post-op check, appears well, surgical team not notified for hypotension, noted on post-op check.    Plan:  - Consulted ICU for hypotension, pt to be transferred for observation and Q1 hour vitals  - F/u post-op labs, blood cultures  - CLD, pain control, antiemetics as needed  - Monitor & record urine output  - AM labs  - Discussed w/ Dr. Stallworth    Surgical Team: [id number]

## 2023-04-08 NOTE — DISCHARGE NOTE PROVIDER - HOSPITAL COURSE
69 yo F with recurrent diverticulitis, known bilateral ovarian cysts followed x years. HPI: Pt is a 69yo F w/ PMHx of CAD s/p stent x2 in 2007 & 2012  ASA and plavix (last took plavix yesterday) , and 5 episodes of diverticulitis in the past all treated with IV antibiotics. She is presenting to the ED with sudden onset abdominal pain, she reports that the pain is similar to past episodes in character except reports that this time the pain came on more suddenly. In the past she would have mild pain that  would progressively gets worse but this time it was bad very quickly. She reports taking tylenol at home that gave little relief. She denies fevers, chills, nausea or vomiting.     Hospital course: Patient admitted for recurrent diverticulitis and patient was medically optimized for OR. Patient was taken to the OR 4/5 for a lap low anterior resection and drainage of pelvis abscess as well as intraoperative gyn consult with bilateral salpingo-oophorectomy for large ovarian cysts. Patient did well post-operatively. Was maintained on IV antibiotics per ID recommendations. At time of discharge patient was tolerating low fiber diet, pain controlled and she was ambulating. Patient was discharged home with oral augmentin per ID to complete a 10 day total course of antibiotics. HPI: Pt is a 67yo F w/ PMHx of CAD s/p stent x2 in 2007 & 2012  ASA and plavix (last took plavix yesterday) , and 5 episodes of diverticulitis in the past all treated with IV antibiotics. She is presenting to the ED with sudden onset abdominal pain, she reports that the pain is similar to past episodes in character except reports that this time the pain came on more suddenly. In the past she would have mild pain that  would progressively gets worse but this time it was bad very quickly. She reports taking tylenol at home that gave little relief. She denies fevers, chills, nausea or vomiting.     Hospital course: Patient admitted for recurrent diverticulitis and patient was medically optimized for OR. Patient was taken to the OR 4/5 for a lap low anterior resection and drainage of pelvis abscess as well as intraoperative gyn consult with bilateral salpingo-oophorectomy for large ovarian cysts. Patient did well post-operatively. Was maintained on IV antibiotics per ID recommendations. At time of discharge patient was tolerating low fiber diet, pain controlled and she was ambulating. Patient was discharged home with oral augmentin per ID to complete a 10 day total course of antibiotics. Patient also instructed to resume her home plavix 4/12.

## 2023-04-08 NOTE — CONSULT NOTE ADULT - REASON FOR ADMISSION
Acute diverticulitis w/ evidence of microperforation

## 2023-04-08 NOTE — PROGRESS NOTE ADULT - ASSESSMENT
68yoF w/ PMHx significant for CAD s/p stent x2 on ASA and plavix (last took plavix 4/3) and multiple episodes of diverticulitis x 5 all treated medically, presenting now with another episode of acute diverticulitis with imaging showing evidence of possible microperforation, plan for possible surgical intervention on 4/8    Cardiac optimization, CAD s/p stents  - ECG shows NSR with no ischemic changes.  No anginal complaints  - Nuc stress test 02/2021 normal myocardial perfusion  - Hx of CAD s/p stent  - Continue ASA 81mg qd and statin  - Continue to hold Plavix preop.  Would resume when appropriate per Surgery    - TTE 02/2021 EF 65%, mild-mod AI  - No evidence of significant volume overload    - BP overnight elevated at systolic 150-160, likely, with some reactive component  - Continue metoprolol and losartan    - Planned for bowel surgery today, 4/8  - No active ischemia, decompensated heart failure, unstable arrhythmia, or severe stenotic valvular disease.  Pt has no modifiable active cardiac risk factors and in the setting of intermediate risk procedure.  She remains optimized as best as possible from cardiovascular standpoint to proceed with planned procedure with routine hemodynamic monitoring.   - Antibiotics per ID     - DVT ppx Lovenox SQ   - Monitor and replete lytes, keep K>4, Mg>2.  - Will continue to follow.    Kisha Santos DNP, NP-C, AGACNP-C  Cardiology   Spectra #[id number]

## 2023-04-08 NOTE — PRE-OP CHECKLIST - SELECT TESTS ORDERED
BMP/CBC/PT/PTT/INR/Type and Cross/Urinalysis/EKG/CXR/COVID-19 BMP/CBC/PT/PTT/INR/Type and Screen/Urinalysis/EKG/CXR/COVID-19 112/BMP/CBC/PT/PTT/INR/Type and Screen/Urinalysis/EKG/CXR/POCT Blood Glucose/COVID-19

## 2023-04-08 NOTE — DISCHARGE NOTE PROVIDER - NSDCFUSCHEDAPPT_GEN_ALL_CORE_FT
Deshawn Cardenas  [facility]  Scheduled Appointment: 04/20/2023

## 2023-04-08 NOTE — PRE-OP CHECKLIST - NOTHING BY MOUTH SINCE
Dictated    1. CAD/USA - needs MCR  2. S/P bedside I&D of small pelvic furuncle - no Cx  3. MRSA colonized  4. DM,HTN,HLD    1. Favor Doxycycline now to cover MRSA  2. No contraindication to MCR  3. Needs Vanco in addition to Cefazolin for the surgery - continue Mupirocin    Yao Saldana MD   07-Apr-2023 23:59

## 2023-04-08 NOTE — DISCHARGE NOTE PROVIDER - NSDCCPCAREPLAN_GEN_ALL_CORE_FT
PRINCIPAL DISCHARGE DIAGNOSIS  Diagnosis: Diverticulitis of intestine with perforation and abscess  Assessment and Plan of Treatment:

## 2023-04-08 NOTE — CHART NOTE - NSCHARTNOTEFT_GEN_A_CORE
Patient seen and examined at bedside, patient feeling well with no complaints at this time, eager for her surgery. Remains NPO.    T(F): 99.3 (04-08-23 @ 07:55), Max: 100.2 (04-08-23 @ 05:21)  HR: 77 (04-08-23 @ 07:55) (77 - 86)  BP: 147/90 (04-08-23 @ 07:55) (138/73 - 160/83)  RR: 18 (04-08-23 @ 07:55) (18 - 18)  SpO2: 98% (04-08-23 @ 07:55) (93% - 98%)    A/P:  - Plan for OR today, will follow up for post-op check

## 2023-04-08 NOTE — CONSULT NOTE ADULT - NS PANP COMMENT GEN_ALL_CORE FT
late entry.  Pt seen and examined on evening of 4/8  Observe in ICU  check postop labs  plan as above

## 2023-04-08 NOTE — PROGRESS NOTE ADULT - SUBJECTIVE AND OBJECTIVE BOX
[facility] -- Dipti Mane Pannella, Patel, Savella, Goodger  Office # [phone]    Follow Up:  Cardiac Optimization    Subjective/Observations: Seen this morning awake and alert.  Getting ready for surgery.  No complaints offered.  No overnight events    REVIEW OF SYSTEMS: All other review of systems is negative unless indicated above  PAST MEDICAL & SURGICAL HISTORY:  Cervical cancer  with leep procedure, denies chemo/ radiation  CAD (coronary artery disease)  S/P coronary artery stent placement   in [place] and  in [place]  Hypercholesteremia  Hypertension  Diverticulitis  Torn ligament  right 3rd and 4th metatarsal  GERD (gastroesophageal reflux disease)  Seasonal allergies  Allergy  PCN  Status post bunionectomy  right with hardware  S/P angioplasty with stent   in [place]  and  in [facility]  Status post   Cervical cancer  LEEP    MEDICATIONS  (STANDING):    MEDICATIONS  (PRN):  HYDROmorphone  Injectable 0.5 milliGRAM(s) IV Push every 10 minutes PRN Severe Pain (7 - 10)    Allergies    codeine (Vomiting)  Flagyl (Vomiting; Nausea)  penicillin (Rash)    Intolerances    Vital Signs Last 24 Hrs  T(C): 37.4 (2023 07:55), Max: 37.9 (2023 05:21)  T(F): 99.3 (2023 07:55), Max: 100.2 (2023 05:21)  HR: 77 (2023 07:55) (71 - 86)  BP: 147/90 (2023 07:55) (138/73 - 160/83)  BP(mean): --  RR: 18 (2023 07:55) (16 - 18)  SpO2: 98% (2023 07:55) (93% - 98%)    Parameters below as of 2023 07:55  Patient On (Oxygen Delivery Method): room air    I&O's Summary    2023 07:01  -  2023 07:00  --------------------------------------------------------  IN: 775 mL / OUT: 0 mL / NET: 775 mL      Weight (kg): 76 ( @ 02:52)  PHYSICAL EXAM:  TELE: Not on tele  Constitutional: NAD, awake and alert, well-developed  HEENT: Moist Mucous Membranes, Anicteric  Pulmonary: Non-labored, breath sounds are clear bilaterally, No wheezing, rales or rhonchi  Cardiovascular: Regular, S1 and S2, No murmurs, rubs, gallops or clicks  Gastrointestinal: Bowel Sounds present, soft, nontender.   Lymph: No peripheral edema. No lymphadenopathy.  Skin: No visible rashes or ulcers.  Psych:  Mood & affect appropriate  LABS: All Labs Reviewed:                        11.7   8.64  )-----------( 294      ( 2023 06:10 )             35.8                         11.8   9.18  )-----------( 288      ( 2023 07:39 )             36.2                         11.4   9.06  )-----------( 224      ( 2023 07:46 )             35.1     2023 06:10    139    |  107    |  4      ----------------------------<  114    3.5     |  28     |  0.63   2023 07:39    139    |  108    |  4      ----------------------------<  100    3.7     |  25     |  0.60   2023 07:46    141    |  109    |  5      ----------------------------<  116    3.3     |  30     |  0.64     Ca    8.8        2023 06:10  Ca    9.1        2023 07:39  Ca    8.7        2023 07:46  Phos  3.2       2023 06:10  Phos  1.9       2023 07:39  Mg     1.8       2023 06:10  Mg     2.2       2023 07:39    TPro  7.2    /  Alb  2.9    /  TBili  0.4    /  DBili  x      /  AST  38     /  ALT  70     /  AlkPhos  120    2023 06:10  TPro  7.5    /  Alb  3.0    /  TBili  0.4    /  DBili  x      /  AST  42     /  ALT  72     /  AlkPhos  109    2023 07:39    PT/INR - ( 2023 06:10 )   PT: 13.0 sec;   INR: 1.11 ratio     PTT - ( 2023 06:10 )  PTT:28.0 sec    Ventricular Rate 74 BPM    Atrial Rate 74 BPM    P-R Interval 174 ms    QRS Duration 82 ms    Q-T Interval 414 ms    QTC Calculation(Bazett) 459 ms    P Axis 34 degrees    R Axis -42 degrees    T Axis 53 degrees    Diagnosis Line Normal sinus rhythm  Left axis deviation  Abnormal ECG  When compared with ECG of 18-MAR-2023 11:41,  Nonspecific T wave abnormality now evident in Lateral leads  Confirmed by Franca Waldron ([id number]) on 2023 7:15:27 AM

## 2023-04-08 NOTE — DISCHARGE NOTE PROVIDER - CARE PROVIDERS DIRECT ADDRESSES
,DirectAddress_Unknown ,DirectAddress_Unknown,[email or web address]

## 2023-04-09 LAB
ALBUMIN SERPL ELPH-MCNC: 2.5 G/DL — LOW (ref 3.3–5)
ALP SERPL-CCNC: 95 U/L — SIGNIFICANT CHANGE UP (ref 40–120)
ALT FLD-CCNC: 49 U/L — SIGNIFICANT CHANGE UP (ref 12–78)
ANION GAP SERPL CALC-SCNC: 4 MMOL/L — LOW (ref 5–17)
AST SERPL-CCNC: 23 U/L — SIGNIFICANT CHANGE UP (ref 15–37)
BILIRUB SERPL-MCNC: 0.2 MG/DL — SIGNIFICANT CHANGE UP (ref 0.2–1.2)
BUN SERPL-MCNC: 7 MG/DL — SIGNIFICANT CHANGE UP (ref 7–23)
CALCIUM SERPL-MCNC: 8.4 MG/DL — LOW (ref 8.5–10.1)
CHLORIDE SERPL-SCNC: 107 MMOL/L — SIGNIFICANT CHANGE UP (ref 96–108)
CO2 SERPL-SCNC: 25 MMOL/L — SIGNIFICANT CHANGE UP (ref 22–31)
CREAT SERPL-MCNC: 0.69 MG/DL — SIGNIFICANT CHANGE UP (ref 0.5–1.3)
EGFR: 94 ML/MIN/1.73M2 — SIGNIFICANT CHANGE UP
GLUCOSE SERPL-MCNC: 227 MG/DL — HIGH (ref 70–99)
HCT VFR BLD CALC: 29.1 % — LOW (ref 34.5–45)
HCT VFR BLD CALC: 30.6 % — LOW (ref 34.5–45)
HGB BLD-MCNC: 10.1 G/DL — LOW (ref 11.5–15.5)
HGB BLD-MCNC: 9.6 G/DL — LOW (ref 11.5–15.5)
MAGNESIUM SERPL-MCNC: 1.9 MG/DL — SIGNIFICANT CHANGE UP (ref 1.6–2.6)
MCHC RBC-ENTMCNC: 29.4 PG — SIGNIFICANT CHANGE UP (ref 27–34)
MCHC RBC-ENTMCNC: 29.7 PG — SIGNIFICANT CHANGE UP (ref 27–34)
MCHC RBC-ENTMCNC: 33 GM/DL — SIGNIFICANT CHANGE UP (ref 32–36)
MCHC RBC-ENTMCNC: 33 GM/DL — SIGNIFICANT CHANGE UP (ref 32–36)
MCV RBC AUTO: 89.3 FL — SIGNIFICANT CHANGE UP (ref 80–100)
MCV RBC AUTO: 90 FL — SIGNIFICANT CHANGE UP (ref 80–100)
MRSA PCR RESULT.: SIGNIFICANT CHANGE UP
NRBC # BLD: 0 /100 WBCS — SIGNIFICANT CHANGE UP (ref 0–0)
NRBC # BLD: 0 /100 WBCS — SIGNIFICANT CHANGE UP (ref 0–0)
PHOSPHATE SERPL-MCNC: 2.1 MG/DL — LOW (ref 2.5–4.5)
PLATELET # BLD AUTO: 299 K/UL — SIGNIFICANT CHANGE UP (ref 150–400)
PLATELET # BLD AUTO: 301 K/UL — SIGNIFICANT CHANGE UP (ref 150–400)
POTASSIUM SERPL-MCNC: 3.9 MMOL/L — SIGNIFICANT CHANGE UP (ref 3.5–5.3)
POTASSIUM SERPL-SCNC: 3.9 MMOL/L — SIGNIFICANT CHANGE UP (ref 3.5–5.3)
PROT SERPL-MCNC: 6.6 G/DL — SIGNIFICANT CHANGE UP (ref 6–8.3)
RBC # BLD: 3.26 M/UL — LOW (ref 3.8–5.2)
RBC # BLD: 3.4 M/UL — LOW (ref 3.8–5.2)
RBC # FLD: 12.8 % — SIGNIFICANT CHANGE UP (ref 10.3–14.5)
RBC # FLD: 12.8 % — SIGNIFICANT CHANGE UP (ref 10.3–14.5)
S AUREUS DNA NOSE QL NAA+PROBE: SIGNIFICANT CHANGE UP
SODIUM SERPL-SCNC: 136 MMOL/L — SIGNIFICANT CHANGE UP (ref 135–145)
WBC # BLD: 11.01 K/UL — HIGH (ref 3.8–10.5)
WBC # BLD: 14.42 K/UL — HIGH (ref 3.8–10.5)
WBC # FLD AUTO: 11.01 K/UL — HIGH (ref 3.8–10.5)
WBC # FLD AUTO: 14.42 K/UL — HIGH (ref 3.8–10.5)

## 2023-04-09 PROCEDURE — 99232 SBSQ HOSP IP/OBS MODERATE 35: CPT

## 2023-04-09 PROCEDURE — 99233 SBSQ HOSP IP/OBS HIGH 50: CPT | Mod: GC

## 2023-04-09 RX ORDER — SIMVASTATIN 20 MG/1
20 TABLET, FILM COATED ORAL AT BEDTIME
Refills: 0 | Status: DISCONTINUED | OUTPATIENT
Start: 2023-04-09 | End: 2023-04-09

## 2023-04-09 RX ORDER — METOPROLOL TARTRATE 50 MG
25 TABLET ORAL
Refills: 0 | Status: DISCONTINUED | OUTPATIENT
Start: 2023-04-09 | End: 2023-04-11

## 2023-04-09 RX ORDER — UBIDECARENONE 100 MG
200 CAPSULE ORAL
Qty: 0 | Refills: 0 | DISCHARGE

## 2023-04-09 RX ORDER — CLOPIDOGREL BISULFATE 75 MG/1
1 TABLET, FILM COATED ORAL
Qty: 0 | Refills: 0 | DISCHARGE

## 2023-04-09 RX ORDER — MONTELUKAST 4 MG/1
1 TABLET, CHEWABLE ORAL
Qty: 0 | Refills: 0 | DISCHARGE

## 2023-04-09 RX ORDER — LOSARTAN POTASSIUM 100 MG/1
1 TABLET, FILM COATED ORAL
Qty: 0 | Refills: 0 | DISCHARGE

## 2023-04-09 RX ORDER — ENOXAPARIN SODIUM 100 MG/ML
40 INJECTION SUBCUTANEOUS EVERY 24 HOURS
Refills: 0 | Status: DISCONTINUED | OUTPATIENT
Start: 2023-04-09 | End: 2023-04-11

## 2023-04-09 RX ORDER — FLUTICASONE PROPIONATE 50 MCG
1 SPRAY, SUSPENSION NASAL
Qty: 0 | Refills: 0 | DISCHARGE

## 2023-04-09 RX ORDER — ASPIRIN/CALCIUM CARB/MAGNESIUM 324 MG
81 TABLET ORAL DAILY
Refills: 0 | Status: DISCONTINUED | OUTPATIENT
Start: 2023-04-09 | End: 2023-04-11

## 2023-04-09 RX ORDER — METOPROLOL TARTRATE 50 MG
1 TABLET ORAL
Qty: 0 | Refills: 0 | DISCHARGE

## 2023-04-09 RX ORDER — L.ACIDOPH/B.ANIMALIS/B.LONGUM 15B CELL
1 CAPSULE ORAL
Qty: 0 | Refills: 0 | DISCHARGE

## 2023-04-09 RX ORDER — SIMVASTATIN 20 MG/1
1 TABLET, FILM COATED ORAL
Qty: 0 | Refills: 0 | DISCHARGE

## 2023-04-09 RX ORDER — IBUPROFEN 200 MG
400 TABLET ORAL EVERY 6 HOURS
Refills: 0 | Status: DISCONTINUED | OUTPATIENT
Start: 2023-04-09 | End: 2023-04-11

## 2023-04-09 RX ORDER — FAMOTIDINE 10 MG/ML
1 INJECTION INTRAVENOUS
Qty: 0 | Refills: 0 | DISCHARGE

## 2023-04-09 RX ORDER — OMEGA-3 ACID ETHYL ESTERS 1 G
0 CAPSULE ORAL
Qty: 0 | Refills: 0 | DISCHARGE

## 2023-04-09 RX ORDER — LOSARTAN POTASSIUM 100 MG/1
50 TABLET, FILM COATED ORAL DAILY
Refills: 0 | Status: DISCONTINUED | OUTPATIENT
Start: 2023-04-09 | End: 2023-04-11

## 2023-04-09 RX ORDER — ASPIRIN/CALCIUM CARB/MAGNESIUM 324 MG
1 TABLET ORAL
Qty: 0 | Refills: 0 | DISCHARGE

## 2023-04-09 RX ORDER — EZETIMIBE 10 MG/1
1 TABLET ORAL
Qty: 0 | Refills: 0 | DISCHARGE

## 2023-04-09 RX ORDER — ASCORBIC ACID 60 MG
1 TABLET,CHEWABLE ORAL
Qty: 0 | Refills: 0 | DISCHARGE

## 2023-04-09 RX ORDER — EZETIMIBE 10 MG/1
10 TABLET ORAL DAILY
Refills: 0 | Status: DISCONTINUED | OUTPATIENT
Start: 2023-04-09 | End: 2023-04-11

## 2023-04-09 RX ADMIN — EZETIMIBE 10 MILLIGRAM(S): 10 TABLET ORAL at 23:11

## 2023-04-09 RX ADMIN — Medication 15 MILLIGRAM(S): at 05:30

## 2023-04-09 RX ADMIN — Medication 15 MILLIGRAM(S): at 05:15

## 2023-04-09 RX ADMIN — PIPERACILLIN AND TAZOBACTAM 25 GRAM(S): 4; .5 INJECTION, POWDER, LYOPHILIZED, FOR SOLUTION INTRAVENOUS at 15:26

## 2023-04-09 RX ADMIN — ENOXAPARIN SODIUM 40 MILLIGRAM(S): 100 INJECTION SUBCUTANEOUS at 13:58

## 2023-04-09 RX ADMIN — POLYETHYLENE GLYCOL 3350 17 GRAM(S): 17 POWDER, FOR SOLUTION ORAL at 11:19

## 2023-04-09 RX ADMIN — Medication 25 MILLIGRAM(S): at 17:13

## 2023-04-09 RX ADMIN — Medication 400 MILLIGRAM(S): at 18:07

## 2023-04-09 RX ADMIN — Medication 400 MILLIGRAM(S): at 23:11

## 2023-04-09 RX ADMIN — Medication 400 MILLIGRAM(S): at 11:24

## 2023-04-09 RX ADMIN — Medication 1000 MILLIGRAM(S): at 23:41

## 2023-04-09 RX ADMIN — Medication 15 MILLIGRAM(S): at 12:00

## 2023-04-09 RX ADMIN — Medication 81 MILLIGRAM(S): at 15:57

## 2023-04-09 RX ADMIN — Medication 400 MILLIGRAM(S): at 05:16

## 2023-04-09 RX ADMIN — PIPERACILLIN AND TAZOBACTAM 25 GRAM(S): 4; .5 INJECTION, POWDER, LYOPHILIZED, FOR SOLUTION INTRAVENOUS at 05:16

## 2023-04-09 RX ADMIN — MONTELUKAST 10 MILLIGRAM(S): 4 TABLET, CHEWABLE ORAL at 11:18

## 2023-04-09 RX ADMIN — Medication 400 MILLIGRAM(S): at 23:42

## 2023-04-09 RX ADMIN — CHLORHEXIDINE GLUCONATE 1 APPLICATION(S): 213 SOLUTION TOPICAL at 05:16

## 2023-04-09 RX ADMIN — LOSARTAN POTASSIUM 50 MILLIGRAM(S): 100 TABLET, FILM COATED ORAL at 11:18

## 2023-04-09 RX ADMIN — Medication 1 SPRAY(S): at 05:15

## 2023-04-09 RX ADMIN — Medication 1000 MILLIGRAM(S): at 12:00

## 2023-04-09 RX ADMIN — Medication 1000 MILLIGRAM(S): at 06:00

## 2023-04-09 RX ADMIN — PIPERACILLIN AND TAZOBACTAM 25 GRAM(S): 4; .5 INJECTION, POWDER, LYOPHILIZED, FOR SOLUTION INTRAVENOUS at 22:02

## 2023-04-09 RX ADMIN — SIMVASTATIN 20 MILLIGRAM(S): 20 TABLET, FILM COATED ORAL at 22:02

## 2023-04-09 RX ADMIN — Medication 400 MILLIGRAM(S): at 23:12

## 2023-04-09 RX ADMIN — PANTOPRAZOLE SODIUM 40 MILLIGRAM(S): 20 TABLET, DELAYED RELEASE ORAL at 05:15

## 2023-04-09 RX ADMIN — Medication 15 MILLIGRAM(S): at 11:19

## 2023-04-09 NOTE — PROGRESS NOTE ADULT - SUBJECTIVE AND OBJECTIVE BOX
[facility] - Dipti Mane, García Martinez Savella  Office Number:  [phone]    Patient resting comfortably in bed in NAD.  Laying flat with no respiratory distress.  No complaints of chest pain, dyspnea, palpitations, PND, or orthopnea.    ROS: negative unless otherwise mentioned.    Telemetry:  sr    MEDICATIONS  (STANDING):  acetaminophen   IVPB .. 1000 milliGRAM(s) IV Intermittent once  acetaminophen   IVPB .. 1000 milliGRAM(s) IV Intermittent once  chlorhexidine 4% Liquid 1 Application(s) Topical <User Schedule>  ezetimibe 10 milliGRAM(s) Oral daily  fluticasone propionate 50 MICROgram(s)/spray Nasal Spray 1 Spray(s) Both Nostrils <User Schedule>  ketorolac   Injectable 15 milliGRAM(s) IV Push every 6 hours  losartan 50 milliGRAM(s) Oral daily  metoprolol tartrate 25 milliGRAM(s) Oral two times a day  montelukast 10 milliGRAM(s) Oral daily  pantoprazole    Tablet 40 milliGRAM(s) Oral before breakfast  piperacillin/tazobactam IVPB.. 3.375 Gram(s) IV Intermittent every 8 hours  polyethylene glycol 3350 17 Gram(s) Oral daily  simvastatin 20 milliGRAM(s) Oral at bedtime    MEDICATIONS  (PRN):  aluminum hydroxide/magnesium hydroxide/simethicone Suspension 30 milliLiter(s) Oral every 4 hours PRN Dyspepsia  ondansetron Injectable 4 milliGRAM(s) IV Push every 6 hours PRN Nausea and/or Vomiting  oxyCODONE    IR 5 milliGRAM(s) Oral every 4 hours PRN Moderate Pain (4 - 6)  oxyCODONE    IR 10 milliGRAM(s) Oral every 4 hours PRN Severe Pain (7 - 10)      Allergies    codeine (Vomiting)  Flagyl (Vomiting; Nausea)  penicillin (Rash)    Intolerances        Vital Signs Last 24 Hrs  T(C): 36.9 (09 Apr 2023 08:04), Max: 37.3 (08 Apr 2023 12:00)  T(F): 98.4 (09 Apr 2023 08:04), Max: 99.1 (08 Apr 2023 12:00)  HR: 68 (09 Apr 2023 09:30) (58 - 80)  BP: 151/65 (09 Apr 2023 09:30) (92/43 - 159/70)  BP(mean): 93 (09 Apr 2023 09:30) (74 - 106)  RR: 21 (09 Apr 2023 09:30) (13 - 27)  SpO2: 96% (09 Apr 2023 09:30) (91% - 98%)    Parameters below as of 09 Apr 2023 07:00  Patient On (Oxygen Delivery Method): room air        I&O's Summary    08 Apr 2023 07:01  -  09 Apr 2023 07:00  --------------------------------------------------------  IN: 1010 mL / OUT: 2380 mL / NET: -1370 mL    09 Apr 2023 07:01  -  09 Apr 2023 10:03  --------------------------------------------------------  IN: 0 mL / OUT: 150 mL / NET: -150 mL        ON EXAM:    Constitutional: NAD, awake and alert, well-developed  HEENT: Moist Mucous Membranes, Anicteric  Pulmonary: Non-labored, breath sounds are clear bilaterally, No wheezing, rales or rhonchi  Cardiovascular: Regular, S1 and S2, No murmurs, rubs, gallops or clicks  Gastrointestinal: Bowel Sounds present, soft, nontender.   Lymph: No peripheral edema. No lymphadenopathy.  Skin: No visible rashes or ulcers.  Psych:  Mood & affect appropriate    LABS: All Labs Reviewed:                        10.1   11.01 )-----------( 299      ( 09 Apr 2023 06:05 )             30.6                         10.8   7.19  )-----------( 283      ( 08 Apr 2023 18:35 )             32.5                         11.7   8.64  )-----------( 294      ( 08 Apr 2023 06:10 )             35.8     09 Apr 2023 06:05    136    |  107    |  7      ----------------------------<  227    3.9     |  25     |  0.69   08 Apr 2023 18:35    137    |  107    |  5      ----------------------------<  190    3.3     |  24     |  0.61   08 Apr 2023 06:10    139    |  107    |  4      ----------------------------<  114    3.5     |  28     |  0.63     Ca    8.4        09 Apr 2023 06:05  Ca    8.5        08 Apr 2023 18:35  Ca    8.8        08 Apr 2023 06:10  Phos  2.1       09 Apr 2023 06:05  Phos  3.4       08 Apr 2023 18:35  Phos  3.2       08 Apr 2023 06:10  Mg     1.9       09 Apr 2023 06:05  Mg     1.8       08 Apr 2023 18:35  Mg     1.8       08 Apr 2023 06:10    TPro  6.6    /  Alb  2.5    /  TBili  0.2    /  DBili  x      /  AST  23     /  ALT  49     /  AlkPhos  95     09 Apr 2023 06:05  TPro  6.7    /  Alb  2.6    /  TBili  0.4    /  DBili  x      /  AST  31     /  ALT  58     /  AlkPhos  107    08 Apr 2023 18:35  TPro  7.2    /  Alb  2.9    /  TBili  0.4    /  DBili  x      /  AST  38     /  ALT  70     /  AlkPhos  120    08 Apr 2023 06:10    PT/INR - ( 08 Apr 2023 06:10 )   PT: 13.0 sec;   INR: 1.11 ratio         PTT - ( 08 Apr 2023 06:10 )  PTT:28.0 sec      Blood Culture:

## 2023-04-09 NOTE — PROGRESS NOTE ADULT - ASSESSMENT
69yo F w/ PMHx of CAD s/p stent x2 in 2007 & 2012  ASA and plavix (last took plavix yesterday) , and 5 episodes of diverticulitis in the past all treated with IV antibiotics. Presented to the ED with sudden onset abdominal pain, she reports that the pain is similar to past episodes in character except reports that this time the pain came on more suddenly. In the past she would have mild pain that  would progressively gets worse but this time it was bad very quickly. She reports taking tylenol at home that gave little relief. She denies fevers, chills, nausea or vomiting.  Found to have diverticulitis with micro perf and start on abx with plan for surgery.    RECOMMENDATIONS  -reviewed with pt and distant PCN rash and has tolerated Zosyn and even recently no issue with augmentin  S/p OR 4/8 for laparoscopic drainage of pelvic abscess    -- follow up OR culture -- NGTD   leukocytosis noted, likely reactive post op - continue to monitor   -c/w Zosyn for now      Celas Mariano M.D.  [facility], Division of Infectious Diseases  [phone]  After 5pm on weekdays and all day on weekends - please call [phone]

## 2023-04-09 NOTE — PROGRESS NOTE ADULT - SUBJECTIVE AND OBJECTIVE BOX
[facility] DIVISION OF INFECTIOUS DISEASES  SEAN Lozano Y. Patel, S. Shah, G. Wu  [phone]  ([phone] - weekdays after 5pm and weekends)    Name: LUMA LANIER  Age/Gender: 68y Female  MRN: [id number]    Interval History:  Patient seen and examined in ICU.   Feels well, no new complaints  Notes reviewed, s/p OR yesterday.   Afebrile.     Allergies: codeine (Vomiting)  Flagyl (Vomiting; Nausea)  penicillin (Rash)      Objective:  Vitals:   T(F): 98 (04-09-23 @ 19:15), Max: 99.1 (04-09-23 @ 00:07)  HR: 94 (04-09-23 @ 19:15) (58 - 94)  BP: 165/77 (04-09-23 @ 19:15) (100/52 - 168/100)  RR: 22 (04-09-23 @ 19:15) (13 - 34)  SpO2: 94% (04-09-23 @ 19:15) (91% - 98%)  Physical Examination:  General: no acute distress  HEENT: NC/AT, EOMI, anicteric, neck supple  Cardio: S1, S2 present, normal rat  Resp: clear to auscultation bilaterally  Abd: soft, NT, ND, + BS, incision c/d/i  Neuro: AAOx3, no obvious focal deficits  Ext: no edema, no cyanosis, moving extremities  Skin: warm, dry, no visible rash  Psych: appropriate affect and mood for situation  Lines: PIV    Laboratory Studies:  CBC:                       9.6    14.42 )-----------( 301      ( 09 Apr 2023 17:12 )             29.1     WBC Trend:  14.42 04-09-23 @ 17:12  11.01 04-09-23 @ 06:05  7.19 04-08-23 @ 18:35  8.64 04-08-23 @ 06:10  9.18 04-07-23 @ 07:39  9.06 04-06-23 @ 07:46  10.82 04-05-23 @ 07:02  10.68 04-04-23 @ 16:25    CMP: 04-09    136  |  107  |  7   ----------------------------<  227<H>  3.9   |  25  |  0.69    Ca    8.4<L>      09 Apr 2023 06:05  Phos  2.1     04-09  Mg     1.9     04-09    TPro  6.6  /  Alb  2.5<L>  /  TBili  0.2  /  DBili  x   /  AST  23  /  ALT  49  /  AlkPhos  95  04-09    Creatinine, Serum: 0.69 mg/dL (04-09-23 @ 06:05)  Creatinine, Serum: 0.61 mg/dL (04-08-23 @ 18:35)  Creatinine, Serum: 0.63 mg/dL (04-08-23 @ 06:10)  Creatinine, Serum: 0.60 mg/dL (04-07-23 @ 07:39)  Creatinine, Serum: 0.64 mg/dL (04-06-23 @ 07:46)  Creatinine, Serum: 0.56 mg/dL (04-05-23 @ 07:02)  Creatinine, Serum: 0.63 mg/dL (04-04-23 @ 16:25)      LIVER FUNCTIONS - ( 09 Apr 2023 06:05 )  Alb: 2.5 g/dL / Pro: 6.6 g/dL / ALK PHOS: 95 U/L / ALT: 49 U/L / AST: 23 U/L / GGT: x               Microbiology: reviewed     Culture - Surgical Swab (collected 04-08-23 @ 10:40)  Source: .Surgical Swab pelvic abcess  Preliminary Report (04-09-23 @ 17:45):    No growth        COVID-19 PCR: NotDetec (07 Apr 2023 07:19)    Radiology: reviewed     Medications:  aluminum hydroxide/magnesium hydroxide/simethicone Suspension 30 milliLiter(s) Oral every 4 hours PRN  aspirin  chewable 81 milliGRAM(s) Oral daily  enoxaparin Injectable 40 milliGRAM(s) SubCutaneous every 24 hours  ezetimibe 10 milliGRAM(s) Oral daily  fluticasone propionate 50 MICROgram(s)/spray Nasal Spray 1 Spray(s) Both Nostrils <User Schedule>  ibuprofen  Tablet. 400 milliGRAM(s) Oral every 6 hours  losartan 50 milliGRAM(s) Oral daily  metoprolol tartrate 25 milliGRAM(s) Oral two times a day  montelukast 10 milliGRAM(s) Oral daily  ondansetron Injectable 4 milliGRAM(s) IV Push every 6 hours PRN  oxyCODONE    IR 5 milliGRAM(s) Oral every 4 hours PRN  oxyCODONE    IR 10 milliGRAM(s) Oral every 4 hours PRN  pantoprazole    Tablet 40 milliGRAM(s) Oral before breakfast  piperacillin/tazobactam IVPB.. 3.375 Gram(s) IV Intermittent every 8 hours  polyethylene glycol 3350 17 Gram(s) Oral daily  simvastatin 20 milliGRAM(s) Oral at bedtime    Current Antimicrobials:  piperacillin/tazobactam IVPB.. 3.375 Gram(s) IV Intermittent every 8 hours    Prior/Completed Antimicrobials:  ertapenem  IVPB  erythromycin     enteric coated  neomycin  piperacillin/tazobactam IVPB.  piperacillin/tazobactam IVPB.-  piperacillin/tazobactam IVPB.-  piperacillin/tazobactam IVPB.-

## 2023-04-09 NOTE — PROGRESS NOTE ADULT - PROBLEM SELECTOR PLAN 1
- To be updated, incomplete! - ERAS protocol  - Continue Zosyn, clear liquid diet  - ATC pain management; continue tylenol, plan to switch to ibuprofen po after 4th dose of toradol  - DVT ppx w/ SCDs; plan to add back ASA 81 & LVX (held for now)  - Serial abdominal exams, encourage OOB & ambulation  - Trending labs daily, monitor vitals  - Tristan in place, strict I&Os  - To be discussed w/ Dr. Stallworth (covering for Dr. Cardenas) - will update plan after

## 2023-04-09 NOTE — CHART NOTE - NSCHARTNOTEFT_GEN_A_CORE
Notified by RN of dark red solid BM. Patient remains HDS: afebrile, SBPs 140-160s (baseline), HR 60s, RR 16, satting high 90s on RA. Abdominal exam benign. Sent STAT CBC to trend Hgb. Transfuse for goal Hgb <7.    Concern for active GIB low. Spoke with Surgery WILL Muir who is in agreement that blood is likely old and originating from anastomosis. Will continue Lovenox for now unless acute drop in Hgb is noted. Plan is to still downgrade patient from ICU if Hgb returns stable.

## 2023-04-09 NOTE — PROGRESS NOTE ADULT - ATTENDING COMMENTS
68F PMH HTN, HLD, CAD s/p MEGAN (2007), ovarian cysts, and recently with episodes of acute diverticulitis previously treated with IV antibiotics who presented on 4/4 with acute abdominal pain, found to have recurrent diverticulitis with microperforation and sigmoid intramural abscess s/p laparoscopic low anterior resection, mobilization of splenic flexure, drainage of pelvic abscess, and bilateral salpingo-oophorectomy of 6 cm L ovarian and 8 cm R ovarian cysts. Post-op with episode of hypotension that responded to IVF hydration.    1. Neuro: awake and alert, walking in hallway, moving all extremities  2. CV: HD stable, restart metoprolol and losartan. Continue simvastatin. Holding DAPT for now pending surgery follow-up  3. Pulm: on room air with normal oxygenation. Incentive spirometry, out of bed to chair, ambulate with assistance  4. GI: on clears, follow-up surgery regarding advancing diet to DASH  5. Renal: normal kidney function, replete low phos  6. ID: continue Zosyn for diverticulitis, f/up blood cultures and intra-op surgical cultures  7. Heme: enoxaparin for DVT ppx  8. Endo: no active issues  9. Skin: no lines, f/up surgery regarding d/c caruso  10. Dispo: full code, discussed with patient at bedside, stable for transfer to floors 68F PMH HTN, HLD, CAD s/p MEGAN (2007), ovarian cysts, and recently with episodes of acute diverticulitis previously treated with IV antibiotics who presented on 4/4 with acute abdominal pain, found to have recurrent diverticulitis with microperforation and sigmoid intramural abscess s/p laparoscopic low anterior resection, mobilization of splenic flexure, drainage of pelvic abscess, and bilateral salpingo-oophorectomy of 6 cm L ovarian and 8 cm R ovarian cysts. Post-op with episode of hypotension that responded to IVF hydration.    1. Neuro: awake and alert, walking in hallway, moving all extremities  2. CV: HD stable, restart metoprolol and losartan. Continue simvastatin. Holding DAPT for now pending surgery follow-up  3. Pulm: on room air with normal oxygenation. Incentive spirometry, out of bed to chair, ambulate with assistance  4. GI: on clears, follow-up surgery regarding advancing diet to DASH  5. Renal: normal kidney function, replete low phos  6. ID: continue Zosyn for diverticulitis, f/up blood cultures and intra-op surgical cultures  7. Heme: enoxaparin for DVT ppx  8. Endo: no active issues  9. Skin: no lines, f/up surgery regarding d/c caruso  10. Dispo: full code, discussed with patient at bedside, stable for transfer to floors    ***Attending Addendum:  Discussed with surgery. Ok to d/c caruso. Advance diet to full liquids. Start aspirin 81 mg oral daily. Hold clopidogrel for now. Start enoxaparin for DVT ppx

## 2023-04-09 NOTE — PROGRESS NOTE ADULT - SUBJECTIVE AND OBJECTIVE BOX
INTERVAL HPI/OVERNIGHT EVENTS: No acute events    SUBJECTIVE: Patient seen and examined at bedside. Pt admits to mild abdominal pain when ambulating and with positional changes, bit states her pain is well controlled with her pain regimen. She has not had any flatus yet. She slept well and denies any CP, SOB, or LE swelling.    ROS:  CV: Denies chest pain  Resp: Denies SOB  GI: + abdominal pain. No flatus. No diarrhea, nausea, vomiting  : Denies dysuria  ID: Denies fevers, chills  MSK: Denies joint pain     OBJECTIVE:    VITAL SIGNS:  ICU Vital Signs Last 24 Hrs  T(C): 36.9 (09 Apr 2023 08:04), Max: 37.3 (08 Apr 2023 12:00)  T(F): 98.4 (09 Apr 2023 08:04), Max: 99.1 (08 Apr 2023 12:00)  HR: 75 (09 Apr 2023 10:00) (58 - 80)  BP: 138/58 (09 Apr 2023 10:00) (92/43 - 159/70)  BP(mean): 84 (09 Apr 2023 10:00) (74 - 106)  ABP: --  ABP(mean): --  RR: 28 (09 Apr 2023 10:00) (13 - 28)  SpO2: 95% (09 Apr 2023 10:00) (91% - 98%)    O2 Parameters below as of 09 Apr 2023 07:00  Patient On (Oxygen Delivery Method): room air              04-08 @ 07:01 - 04-09 @ 07:00  --------------------------------------------------------  IN: 1010 mL / OUT: 2380 mL / NET: -1370 mL    04-09 @ 07:01  -  04-09 @ 10:18  --------------------------------------------------------  IN: 0 mL / OUT: 150 mL / NET: -150 mL      CAPILLARY BLOOD GLUCOSE      POCT Blood Glucose.: 112 mg/dL (08 Apr 2023 07:05)      PHYSICAL EXAM:  General: NAD, comfortable  HEENT: NCAT, PERRL, clear conjunctiva, no scleral icterus  Neck: supple, no JVD  Respiratory: CTA b/l, no wheezing, rhonchi, rales  Cardiovascular: RRR, normal S1S2, no M/R/G  Abdomen: soft, Appropriate tenderness, +4 incisions without any erythema or discharge. Bowel sounds in all four quadrants, no palpable masses  Extremities: WWP, no clubbing, cyanosis, or edema  Neurology: A&Ox3, nonfocal, sensation intact     MEDICATIONS:  MEDICATIONS  (STANDING):  acetaminophen   IVPB .. 1000 milliGRAM(s) IV Intermittent once  acetaminophen   IVPB .. 1000 milliGRAM(s) IV Intermittent once  chlorhexidine 4% Liquid 1 Application(s) Topical <User Schedule>  ezetimibe 10 milliGRAM(s) Oral daily  fluticasone propionate 50 MICROgram(s)/spray Nasal Spray 1 Spray(s) Both Nostrils <User Schedule>  ketorolac   Injectable 15 milliGRAM(s) IV Push every 6 hours  losartan 50 milliGRAM(s) Oral daily  metoprolol tartrate 25 milliGRAM(s) Oral two times a day  montelukast 10 milliGRAM(s) Oral daily  pantoprazole    Tablet 40 milliGRAM(s) Oral before breakfast  piperacillin/tazobactam IVPB.. 3.375 Gram(s) IV Intermittent every 8 hours  polyethylene glycol 3350 17 Gram(s) Oral daily  simvastatin 20 milliGRAM(s) Oral at bedtime    MEDICATIONS  (PRN):  aluminum hydroxide/magnesium hydroxide/simethicone Suspension 30 milliLiter(s) Oral every 4 hours PRN Dyspepsia  ondansetron Injectable 4 milliGRAM(s) IV Push every 6 hours PRN Nausea and/or Vomiting  oxyCODONE    IR 5 milliGRAM(s) Oral every 4 hours PRN Moderate Pain (4 - 6)  oxyCODONE    IR 10 milliGRAM(s) Oral every 4 hours PRN Severe Pain (7 - 10)      ALLERGIES:  Allergies    codeine (Vomiting)  Flagyl (Vomiting; Nausea)  penicillin (Rash)    Intolerances        LABS:                        10.1   11.01 )-----------( 299      ( 09 Apr 2023 06:05 )             30.6     04-09    136  |  107  |  7   ----------------------------<  227<H>  3.9   |  25  |  0.69    Ca    8.4<L>      09 Apr 2023 06:05  Phos  2.1     04-09  Mg     1.9     04-09    TPro  6.6  /  Alb  2.5<L>  /  TBili  0.2  /  DBili  x   /  AST  23  /  ALT  49  /  AlkPhos  95  04-09    PT/INR - ( 08 Apr 2023 06:10 )   PT: 13.0 sec;   INR: 1.11 ratio         PTT - ( 08 Apr 2023 06:10 )  PTT:28.0 sec      RADIOLOGY & ADDITIONAL TESTS: Reviewed.      CENTRAL LINE: N        DATE INSERTED:             REMOVE: N  MCGOWAN: Y                       DATE INSERTED: 4/8             REMOVE: N  A-LINE: N                       DATE INSERTED:              REMOVE: Y/N      GLOBAL ISSUE/BEST PRACTICE  Analgesia: Y  Sedation: N  HOB elevation: yes  Stress ulcer prophylaxis: N  VTE prophylaxis: SCDs  Glycemic control: N  Nutrition: Y    CODE STATUS: Full Code

## 2023-04-09 NOTE — PROGRESS NOTE ADULT - ASSESSMENT
67 y/o F POD #1 s/p lap low anterior resection, mobilization of splenic flexure, drainage of a pelvic abscess & b/l salpingo-oophorectomy. Pt sent to ICU post-operatively for hypotension. Appears well this morning, vitals & labs stable.

## 2023-04-09 NOTE — PROGRESS NOTE ADULT - SUBJECTIVE AND OBJECTIVE BOX
Pt seen and examined at bedside, reports waking up around 4am feeling well but just complains episodic of "crampy" RLQ pain, rates it a 7/10 at its worse. Vital signs stable, hypotension resolved. Tolerating clear liquid diet. Caruso in place, no GI function yet. Pt OOB & ambulating. Denies any nausea, vomiting, chest pain, palpitations, shortness of breath.    T(C): 36.9 (04-09-23 @ 08:04), Max: 37.3 (04-08-23 @ 12:00)  HR: 69 (04-09-23 @ 07:00) (58 - 80)  BP: 155/74 (04-09-23 @ 07:00) (92/43 - 155/74)  RR: 20 (04-09-23 @ 07:00) (13 - 26)  SpO2: 98% (04-09-23 @ 07:00) (91% - 98%)    PHYSICAL EXAM:  General: no acute distress, appears comfortable; caruso in place w/ clear yellow urine in collection bag  HEENT: normocephalic, anicteric  Pulm: non labored respirations  Cardio: RRR  Abdomen: soft, mildly distended, mildly tender to palpation in lower quadrants and around central incision, surgical incision intact w/ dermabond, mild bruising noted around central incision  Extremities: no calf tenderness or LE edema    I&O's Detail    08 Apr 2023 07:01  -  09 Apr 2023 07:00  --------------------------------------------------------  IN:    IV PiggyBack: 200 mL    IV PiggyBack: 300 mL    Oral Fluid: 510 mL  Total IN: 1010 mL    OUT:    Indwelling Catheter - Urethral (mL): 2380 mL  Total OUT: 2380 mL    Total NET: -1370 mL    LABS:                        10.1   11.01 )-----------( 299      ( 09 Apr 2023 06:05 )             30.6     04-09    136  |  107  |  7   ----------------------------<  227<H>  3.9   |  25  |  0.69    Ca    8.4<L>      09 Apr 2023 06:05  Phos  2.1     04-09  Mg     1.9     04-09    TPro  6.6  /  Alb  2.5<L>  /  TBili  0.2  /  DBili  x   /  AST  23  /  ALT  49  /  AlkPhos  95  04-09    PT/INR - ( 08 Apr 2023 06:10 )   PT: 13.0 sec;   INR: 1.11 ratio    PTT - ( 08 Apr 2023 06:10 )  PTT:28.0 sec    MEDICATIONS:  acetaminophen   IVPB .. 1000 milliGRAM(s) IV Intermittent once  acetaminophen   IVPB .. 1000 milliGRAM(s) IV Intermittent once  aluminum hydroxide/magnesium hydroxide/simethicone Suspension 30 milliLiter(s) Oral every 4 hours PRN  chlorhexidine 4% Liquid 1 Application(s) Topical <User Schedule>  fluticasone propionate 50 MICROgram(s)/spray Nasal Spray 1 Spray(s) Both Nostrils <User Schedule>  HYDROmorphone  Injectable 0.5 milliGRAM(s) IV Push every 4 hours PRN  ketorolac   Injectable 15 milliGRAM(s) IV Push every 6 hours  montelukast 10 milliGRAM(s) Oral daily  ondansetron Injectable 4 milliGRAM(s) IV Push every 6 hours PRN  oxyCODONE    IR 5 milliGRAM(s) Oral every 4 hours PRN  oxyCODONE    IR 10 milliGRAM(s) Oral every 4 hours PRN  pantoprazole    Tablet 40 milliGRAM(s) Oral before breakfast  piperacillin/tazobactam IVPB.. 3.375 Gram(s) IV Intermittent every 8 hours  polyethylene glycol 3350 17 Gram(s) Oral daily  simvastatin 20 milliGRAM(s) Oral at bedtime

## 2023-04-09 NOTE — PROGRESS NOTE ADULT - ASSESSMENT
68F with CAD s/p MEGAN x2 in 2007, 5 episodes of diverticulitis in the past treated with IV antibiotics who presented on 4/4/23 with acute onset abdominal pain found to be recurrent acute diverticulitis with imaging revealing microperforation, started on empiric zosyn. Now POD #1 s/p laparoscopic low anterior resection, mobilization of splenic flexure, drainage of a pelvic abscess & b/l salpingo-oophorectomy of 6cm L ovarian and 8cm R ovarian cysts. Transferred to ICU for post-op hypotension.    NEURO:   - Mentation intact   - PRN Pain Regimen    CARDIAC:   HTN and CAD s/p 2 MEGAN  - Post-op hypotension resolved s/p 1L NS   - Now with SBPs in 150s  - Restart home losartan  - Start metoprolol tartrate 25mg BID, if tolerates well today can transition to metoprolol succinate 50mg   - Continue home lipitor  - Holding DAPT for recent instrumentation, restart when cleared by surgery.    RESPIRATORY:   - No acute issues   - Saturating well on room air    GI:   - Acute Diverticulitis s/p laparoscopic low anterior resection with drainage of pelvic abscess and bilateral Ovarian Cysts s/p laparoscopic BSO  - Clear liquid diet, advance diet as per surgery.    :   - Continue to trend Cr and monitor I&Os. Goal K>4, Mg>2, P>3.    ENDO:   - No active issues. Goal -180.    ID:   - Mild leukocytosis today likely due to recent surgery  - Follow up pending CBC w/ diff and lactate.   - Concern for possible bacterial seeding intraoperatively given evacuation of pelvic abscess. STAT blood cxs ordered. Continue empiric Zosyn, narrow abx when appropriate. ID following.    HEME  - Holding chemical DVT ppx as per surgery  - SCDs for now.    Dispo: Stable for floors

## 2023-04-09 NOTE — PROGRESS NOTE ADULT - ASSESSMENT
68yoF w/ PMHx significant for CAD s/p stent x2 on ASA and plavix (last took plavix 4/3) and multiple episodes of diverticulitis x 5 all treated medically, presenting now with another episode of acute diverticulitis with imaging showing evidence of possible microperforation.    Cardiac optimization, CAD s/p stents  - She is now s/p Laparoscopic bilateral salpingo-oophorectomy and Laparoscopic low anterior resection with drainage of pelvic abscess on 4/8/22  - tolerated procedure well without cardiac complication  - hypotension resolved  - ECG shows NSR with no ischemic changes.  No anginal complaints  - Nuc stress test 02/2021 normal myocardial perfusion  - Hx of CAD s/p stent  - cont statin and zeti  - resume asa when safe from a surgical perspective  - cont to hold plavix    - TTE 02/2021 EF 65%, mild-mod AI  - No evidence of significant volume overload    - BP low after procedure, though improved  - Continue metoprolol and losartan as tolerated    - DVT ppx Lovenox SQ   - Monitor and replete lytes, keep K>4, Mg>2.  - Will continue to follow.

## 2023-04-10 LAB
ANION GAP SERPL CALC-SCNC: 6 MMOL/L — SIGNIFICANT CHANGE UP (ref 5–17)
BUN SERPL-MCNC: 8 MG/DL — SIGNIFICANT CHANGE UP (ref 7–23)
CALCIUM SERPL-MCNC: 8.9 MG/DL — SIGNIFICANT CHANGE UP (ref 8.5–10.1)
CHLORIDE SERPL-SCNC: 109 MMOL/L — HIGH (ref 96–108)
CO2 SERPL-SCNC: 26 MMOL/L — SIGNIFICANT CHANGE UP (ref 22–31)
CREAT SERPL-MCNC: 0.67 MG/DL — SIGNIFICANT CHANGE UP (ref 0.5–1.3)
EGFR: 95 ML/MIN/1.73M2 — SIGNIFICANT CHANGE UP
GLUCOSE SERPL-MCNC: 109 MG/DL — HIGH (ref 70–99)
HCT VFR BLD CALC: 30.2 % — LOW (ref 34.5–45)
HGB BLD-MCNC: 10 G/DL — LOW (ref 11.5–15.5)
MAGNESIUM SERPL-MCNC: 2 MG/DL — SIGNIFICANT CHANGE UP (ref 1.6–2.6)
MCHC RBC-ENTMCNC: 29.8 PG — SIGNIFICANT CHANGE UP (ref 27–34)
MCHC RBC-ENTMCNC: 33.1 GM/DL — SIGNIFICANT CHANGE UP (ref 32–36)
MCV RBC AUTO: 89.9 FL — SIGNIFICANT CHANGE UP (ref 80–100)
NRBC # BLD: 0 /100 WBCS — SIGNIFICANT CHANGE UP (ref 0–0)
PHOSPHATE SERPL-MCNC: 2.4 MG/DL — LOW (ref 2.5–4.5)
PLATELET # BLD AUTO: 319 K/UL — SIGNIFICANT CHANGE UP (ref 150–400)
POTASSIUM SERPL-MCNC: 3.1 MMOL/L — LOW (ref 3.5–5.3)
POTASSIUM SERPL-SCNC: 3.1 MMOL/L — LOW (ref 3.5–5.3)
RBC # BLD: 3.36 M/UL — LOW (ref 3.8–5.2)
RBC # FLD: 12.9 % — SIGNIFICANT CHANGE UP (ref 10.3–14.5)
SODIUM SERPL-SCNC: 141 MMOL/L — SIGNIFICANT CHANGE UP (ref 135–145)
WBC # BLD: 12.22 K/UL — HIGH (ref 3.8–10.5)
WBC # FLD AUTO: 12.22 K/UL — HIGH (ref 3.8–10.5)

## 2023-04-10 PROCEDURE — 99232 SBSQ HOSP IP/OBS MODERATE 35: CPT

## 2023-04-10 RX ORDER — POTASSIUM CHLORIDE 20 MEQ
40 PACKET (EA) ORAL EVERY 4 HOURS
Refills: 0 | Status: COMPLETED | OUTPATIENT
Start: 2023-04-10 | End: 2023-04-11

## 2023-04-10 RX ADMIN — Medication 400 MILLIGRAM(S): at 05:45

## 2023-04-10 RX ADMIN — EZETIMIBE 10 MILLIGRAM(S): 10 TABLET ORAL at 22:23

## 2023-04-10 RX ADMIN — Medication 1 SPRAY(S): at 05:15

## 2023-04-10 RX ADMIN — Medication 40 MILLIEQUIVALENT(S): at 22:23

## 2023-04-10 RX ADMIN — PIPERACILLIN AND TAZOBACTAM 25 GRAM(S): 4; .5 INJECTION, POWDER, LYOPHILIZED, FOR SOLUTION INTRAVENOUS at 22:23

## 2023-04-10 RX ADMIN — Medication 400 MILLIGRAM(S): at 18:29

## 2023-04-10 RX ADMIN — ENOXAPARIN SODIUM 40 MILLIGRAM(S): 100 INJECTION SUBCUTANEOUS at 16:00

## 2023-04-10 RX ADMIN — PANTOPRAZOLE SODIUM 40 MILLIGRAM(S): 20 TABLET, DELAYED RELEASE ORAL at 05:15

## 2023-04-10 RX ADMIN — LOSARTAN POTASSIUM 50 MILLIGRAM(S): 100 TABLET, FILM COATED ORAL at 05:15

## 2023-04-10 RX ADMIN — Medication 25 MILLIGRAM(S): at 18:28

## 2023-04-10 RX ADMIN — Medication 30 MILLILITER(S): at 05:35

## 2023-04-10 RX ADMIN — PIPERACILLIN AND TAZOBACTAM 25 GRAM(S): 4; .5 INJECTION, POWDER, LYOPHILIZED, FOR SOLUTION INTRAVENOUS at 05:14

## 2023-04-10 RX ADMIN — PIPERACILLIN AND TAZOBACTAM 25 GRAM(S): 4; .5 INJECTION, POWDER, LYOPHILIZED, FOR SOLUTION INTRAVENOUS at 16:00

## 2023-04-10 RX ADMIN — Medication 400 MILLIGRAM(S): at 13:07

## 2023-04-10 RX ADMIN — Medication 25 MILLIGRAM(S): at 05:15

## 2023-04-10 RX ADMIN — MONTELUKAST 10 MILLIGRAM(S): 4 TABLET, CHEWABLE ORAL at 12:37

## 2023-04-10 RX ADMIN — Medication 400 MILLIGRAM(S): at 05:15

## 2023-04-10 RX ADMIN — Medication 81 MILLIGRAM(S): at 12:37

## 2023-04-10 RX ADMIN — SIMVASTATIN 20 MILLIGRAM(S): 20 TABLET, FILM COATED ORAL at 22:23

## 2023-04-10 RX ADMIN — Medication 400 MILLIGRAM(S): at 12:37

## 2023-04-10 NOTE — PROGRESS NOTE ADULT - ASSESSMENT
68yoF w/ PMHx significant for CAD s/p stent x2 on ASA and plavix (last took plavix 4/3) and multiple episodes of diverticulitis x 5 all treated medically, presenting now with another episode of acute diverticulitis with imaging showing evidence of possible microperforation.    Cardiac optimization, CAD s/p stents, HTN  - s/p Laparoscopic bilateral salpingo-oophorectomy and Laparoscopic low anterior resection with drainage of pelvic abscess on 4/8/22  - Tolerated procedure well without cardiac complication    - BP now on the higher side at systolic 140-150.  Pain control per Primary  - Continue Metoprolol and Losartan as tolerated    - ECG shows NSR with no ischemic changes.  No anginal complaints  - Nuc stress test 02/2021 normal myocardial perfusion  - Hx of CAD s/p stent  - Cont statin and Zetia  - Back on ASA  - Okay to continue to hold Plavix    - TTE 02/2021 EF 65%, mild-mod AI  - No evidence of significant volume overload    - DVT ppx Lovenox SQ   - Monitor and replete lytes, keep K>4, Mg>2.  - Will continue to follow.    Kisha Santos DNP, NP-C, AGACNP-C  Cardiology   Spectra #[phone]

## 2023-04-10 NOTE — PROGRESS NOTE ADULT - SUBJECTIVE AND OBJECTIVE BOX
[facility] -- Dipti Mane Pannella, Patel, Savella, Goodger  Office # [phone]    Follow Up:  Cardiac Optimization, HTN, Hx CAD s/p stents    Subjective/Observations: Sitting on the chair.  Tolerating RA.  Denies postop pain, N/V.  Tolerating diet.  Denies any respiratory or cardiac discomfort    REVIEW OF SYSTEMS: All other review of systems is negative unless indicated above  PAST MEDICAL & SURGICAL HISTORY:  Cervical cancer  with leep procedure, denies chemo/ radiation  CAD (coronary artery disease)  S/P coronary artery stent placement   in LAD and  in [place]us  Hypercholesteremia  Hypertension  Diverticulitis  Torn ligament  right 3rd and 4th metatarsal  GERD (gastroesophageal reflux disease)  Seasonal allergies  Allergy  PCN  Status post bunionectomy  right with hardware  S/P angioplasty with stent   in L[place]  and  in [facility]  Status post   Cervical cancer  LEEP    MEDICATIONS  (STANDING):  aspirin  chewable 81 milliGRAM(s) Oral daily  enoxaparin Injectable 40 milliGRAM(s) SubCutaneous every 24 hours  ezetimibe 10 milliGRAM(s) Oral daily  fluticasone propionate 50 MICROgram(s)/spray Nasal Spray 1 Spray(s) Both Nostrils <User Schedule>  ibuprofen  Tablet. 400 milliGRAM(s) Oral every 6 hours  losartan 50 milliGRAM(s) Oral daily  metoprolol tartrate 25 milliGRAM(s) Oral two times a day  montelukast 10 milliGRAM(s) Oral daily  pantoprazole    Tablet 40 milliGRAM(s) Oral before breakfast  piperacillin/tazobactam IVPB.. 3.375 Gram(s) IV Intermittent every 8 hours  polyethylene glycol 3350 17 Gram(s) Oral daily  simvastatin 20 milliGRAM(s) Oral at bedtime    MEDICATIONS  (PRN):  aluminum hydroxide/magnesium hydroxide/simethicone Suspension 30 milliLiter(s) Oral every 4 hours PRN Dyspepsia  ondansetron Injectable 4 milliGRAM(s) IV Push every 6 hours PRN Nausea and/or Vomiting  oxyCODONE    IR 5 milliGRAM(s) Oral every 4 hours PRN Moderate Pain (4 - 6)  oxyCODONE    IR 10 milliGRAM(s) Oral every 4 hours PRN Severe Pain (7 - 10)    Allergies    codeine (Vomiting)  Flagyl (Vomiting; Nausea)  penicillin (Rash)    Intolerances    Vital Signs Last 24 Hrs  T(C): 36.9 (10 Apr 2023 04:57), Max: 36.9 (10 Apr 2023 04:57)  T(F): 98.4 (10 Apr 2023 04:57), Max: 98.4 (10 Apr 2023 04:57)  HR: 61 (10 Apr 2023 04:57) (61 - 94)  BP: 144/73 (10 Apr 2023 04:57) (144/73 - 168/100)  BP(mean): 96 (2023 18:00) (96 - 128)  RR: 18 (10 Apr 2023 04:57) (18 - 34)  SpO2: 95% (10 Apr 2023 04:57) (94% - 98%)    Parameters below as of 10 Apr 2023 04:57  Patient On (Oxygen Delivery Method): room air    I&O's Summary    2023 07:01  -  10 Apr 2023 07:00  --------------------------------------------------------  IN: 0 mL / OUT: 975 mL / NET: -975 m      PHYSICAL EXAM:  TELE: Not on tele  Constitutional: NAD, awake and alert, well-developed  HEENT: Moist Mucous Membranes, Anicteric  Pulmonary: Non-labored, breath sounds are clear bilaterally, No wheezing, rales or rhonchi  Cardiovascular: Regular, S1 and S2, No murmurs, rubs, gallops or clicks  Gastrointestinal: Bowel Sounds present, soft, nontender.   Lymph: No peripheral edema. No lymphadenopathy.  Skin: No visible rashes or ulcers.  Postop abdominal incision  Psych:  Mood & affect appropriate  LABS: All Labs Reviewed:                        10.0   12.22 )-----------( 319      ( 10 Apr 2023 08:28 )             30.2                         9.6    14.42 )-----------( 301      ( 2023 17:12 )             29.1                         10.1   11.01 )-----------( 299      ( 2023 06:05 )             30.6     10 Apr 2023 08:28    141    |  109    |  8      ----------------------------<  109    3.1     |  26     |  0.67   2023 06:05    136    |  107    |  7      ----------------------------<  227    3.9     |  25     |  0.69   2023 18:35    137    |  107    |  5      ----------------------------<  190    3.3     |  24     |  0.61     Ca    8.9        10 Apr 2023 08:28  Ca    8.4        2023 06:05  Ca    8.5        2023 18:35  Phos  2.4       10 Apr 2023 08:28  Phos  2.1       2023 06:05  Phos  3.4       2023 18:35  Mg     2.0       10 Apr 2023 08:28  Mg     1.9       2023 06:05  Mg     1.8       2023 18:35    TPro  6.6    /  Alb  2.5    /  TBili  0.2    /  DBili  x      /  AST  23     /  ALT  49     /  AlkPhos  95     2023 06:05  TPro  6.7    /  Alb  2.6    /  TBili  0.4    /  DBili  x      /  AST  31     /  ALT  58     /  AlkPhos  107    2023 18:35  TPro  7.2    /  Alb  2.9    /  TBili  0.4    /  DBili  x      /  AST  38     /  ALT  70     /  AlkPhos  120    2023 06:10      Ventricular Rate 74 BPM    Atrial Rate 74 BPM    P-R Interval 174 ms    QRS Duration 82 ms    Q-T Interval 414 ms    QTC Calculation(Bazett) 459 ms    P Axis 34 degrees    R Axis -42 degrees    T Axis 53 degrees    Diagnosis Line Normal sinus rhythm  Left axis deviation  Abnormal ECG  When compared with ECG of 18-MAR-2023 11:41,  Nonspecific T wave abnormality now evident in Lateral leads  Confirmed by Franca Waldron ([id number]) on 2023 7:15:27 AM

## 2023-04-10 NOTE — PROGRESS NOTE ADULT - SUBJECTIVE AND OBJECTIVE BOX
[facility], Division of Infectious Diseases  SEAN Lozano Y. Patel, S. Shah, G. Wu  [phone]  ([phone] - weekdays after 5pm and weekends)    Name: LUMA LANIER  Age/Gender: 68y Female  MRN: [id number]    Interval History:  Notes reviewed.   No concerning overnight events.  Afebrile.   denies abd pain, nausea  eating fine    Allergies: codeine (Vomiting)  Flagyl (Vomiting; Nausea)  penicillin (Rash)      Objective:  Vitals:   T(F): 97.9 (04-10-23 @ 12:53), Max: 98.4 (04-10-23 @ 04:57)  HR: 67 (04-10-23 @ 12:53) (61 - 94)  BP: 158/74 (04-10-23 @ 12:53) (144/73 - 168/100)  RR: 18 (04-10-23 @ 12:53) (18 - 27)  SpO2: 96% (04-10-23 @ 12:53) (94% - 97%)  Physical Examination:  General: no acute distress  HEENT: anicteric  Cardio: S1, S2, normal rate  Resp: clear to auscultation  Abd: soft, NT, ND, surgical incisions c/d/i  Ext: no LE edema  Skin: warm, dry    Laboratory Studies:  CBC:                       10.0   12.22 )-----------( 319      ( 10 Apr 2023 08:28 )             30.2     WBC Trend:  12.22 04-10-23 @ 08:28  14.42 04-09-23 @ 17:12  11.01 04-09-23 @ 06:05  7.19 04-08-23 @ 18:35  8.64 04-08-23 @ 06:10  9.18 04-07-23 @ 07:39  9.06 04-06-23 @ 07:46  10.82 04-05-23 @ 07:02  10.68 04-04-23 @ 16:25    CMP: 04-10    141  |  109<H>  |  8   ----------------------------<  109<H>  3.1<L>   |  26  |  0.67    Ca    8.9      10 Apr 2023 08:28  Phos  2.4     04-10  Mg     2.0     04-10    TPro  6.6  /  Alb  2.5<L>  /  TBili  0.2  /  DBili  x   /  AST  23  /  ALT  49  /  AlkPhos  95  04-09      LIVER FUNCTIONS - ( 09 Apr 2023 06:05 )  Alb: 2.5 g/dL / Pro: 6.6 g/dL / ALK PHOS: 95 U/L / ALT: 49 U/L / AST: 23 U/L / GGT: x               Microbiology: reviewed     Culture - Blood (collected 04-08-23 @ 18:31)  Source: .Blood Blood-Peripheral  Preliminary Report (04-10-23 @ 04:01):    No growth to date.    Culture - Blood (collected 04-08-23 @ 18:25)  Source: .Blood Blood-Peripheral  Preliminary Report (04-10-23 @ 04:01):    No growth to date.    Culture - Surgical Swab (collected 04-08-23 @ 10:40)  Source: .Surgical Swab pelvic abcess  Preliminary Report (04-09-23 @ 17:45):    No growth        Radiology: reviewed     Medications:  aluminum hydroxide/magnesium hydroxide/simethicone Suspension 30 milliLiter(s) Oral every 4 hours PRN  aspirin  chewable 81 milliGRAM(s) Oral daily  enoxaparin Injectable 40 milliGRAM(s) SubCutaneous every 24 hours  ezetimibe 10 milliGRAM(s) Oral daily  fluticasone propionate 50 MICROgram(s)/spray Nasal Spray 1 Spray(s) Both Nostrils <User Schedule>  ibuprofen  Tablet. 400 milliGRAM(s) Oral every 6 hours  losartan 50 milliGRAM(s) Oral daily  metoprolol tartrate 25 milliGRAM(s) Oral two times a day  montelukast 10 milliGRAM(s) Oral daily  ondansetron Injectable 4 milliGRAM(s) IV Push every 6 hours PRN  oxyCODONE    IR 5 milliGRAM(s) Oral every 4 hours PRN  oxyCODONE    IR 10 milliGRAM(s) Oral every 4 hours PRN  pantoprazole    Tablet 40 milliGRAM(s) Oral before breakfast  piperacillin/tazobactam IVPB.. 3.375 Gram(s) IV Intermittent every 8 hours  polyethylene glycol 3350 17 Gram(s) Oral daily  simvastatin 20 milliGRAM(s) Oral at bedtime    Antimicrobials:  piperacillin/tazobactam IVPB.. 3.375 Gram(s) IV Intermittent every 8 hours

## 2023-04-10 NOTE — PROGRESS NOTE ADULT - SUBJECTIVE AND OBJECTIVE BOX
Postoperative Day #: 2 s/p LAR, pelvic abscess drainage and BSO     68y Female admitted with PMHx of CAD/NSTEMI s/p 1 stent in LAD 2007 and 1 stent in Ramus 2012, HTN, HLD, cervical CA, GERD, hx recurrent diverticulitis (5 episodes) admitted with acute diverticulitis    Patient is doing well, transferred from ICU yesterday to the floor, NAEO. Pain controlled OOB ambulating. Tolerating FLD. Having BM/flatus. Denies n/v/     Denies chest pain, dyspnea, cough.    T(F): 98.4 (04-10-23 @ 04:57), Max: 98.4 (04-09-23 @ 08:04)  HR: 61 (04-10-23 @ 04:57) (61 - 94)  BP: 144/73 (04-10-23 @ 04:57) (138/58 - 168/100)  RR: 18 (04-10-23 @ 04:57) (17 - 34)  SpO2: 95% (04-10-23 @ 04:57) (94% - 98%)  Wt(kg): --  CAPILLARY BLOOD GLUCOSE          PHYSICAL EXAM:  General: NAD, WDWN.   Neuro:  Alert & oriented x 3  HEENT: NCAT, EOMI, conjunctiva clear  CV: RR  Lung: nwob  Abdomen: soft, NTND. Incisions CDI, no rebound or guarding.   Extremities: no pedal edema or calf tenderness noted   : voiding spontaneously,     LABS:                        9.6    14.42 )-----------( 301      ( 09 Apr 2023 17:12 )             29.1     04-09    136  |  107  |  7   ----------------------------<  227<H>  3.9   |  25  |  0.69    Ca    8.4<L>      09 Apr 2023 06:05  Phos  2.1     04-09  Mg     1.9     04-09    TPro  6.6  /  Alb  2.5<L>  /  TBili  0.2  /  DBili  x   /  AST  23  /  ALT  49  /  AlkPhos  95  04-09      I&O's Detail    09 Apr 2023 07:01  -  10 Apr 2023 07:00  --------------------------------------------------------  IN:  Total IN: 0 mL    OUT:    Indwelling Catheter - Urethral (mL): 975 mL  Total OUT: 975 mL    Total NET: -975 mL          Impression: 68y Female PMHx of CAD/NSTEMI s/p 1 stent in LAD 2007 and 1 stent in Ramus 2012, HTN, HLD, cervical CA, GERD, hx recurrent diverticulitis (5 episodes) admitted with acute diverticulitis now POD1 s/p LAR, pelvic abscess drainage and BSO. Doing well post-operatively transferred to the floor from ICU. Having bowel function. vs wnl, afebrile. Exam w/ incisions CDI. Labs significant for WBC 14 yesterday, will follow up today.       Plan:  -If patient is tolerating FLD, plan to advance to low residue  -Continue Zosyn as per ID, FU WBC today.   -continue VTE prophylaxis   -Increase activity with PT, OOB, Ambulate  -Patient instructed on and encouraged incentive spirometry use  -continue local wound care  -will discuss with Dr. Cardenas

## 2023-04-10 NOTE — PROGRESS NOTE ADULT - ASSESSMENT
67yo F w/ PMHx of CAD s/p stent x2 in 2007 & 2012  ASA and plavix (last took plavix yesterday) , and 5 episodes of diverticulitis in the past all treated with IV antibiotics. Presented to the ED with sudden onset abdominal pain, she reports that the pain is similar to past episodes in character except reports that this time the pain came on more suddenly. In the past she would have mild pain that  would progressively gets worse but this time it was bad very quickly. She reports taking tylenol at home that gave little relief. She denies fevers, chills, nausea or vomiting.  Found to have diverticulitis with micro perf and start on abx with plan for surgery.    RECOMMENDATIONS  -reviewed with pt and distant PCN rash and has tolerated Zosyn and even recently no issue with augmentin  S/p OR 4/8 for laparoscopic drainage of pelvic abscess   -- follow up OR culture -- NGTD   leukocytosis noted, likely reactive post op - continue to monitor   - c/w Zosyn for now  - if culture remains without growth tomorrow can transition to augmentin 875-125mg bid to complete 10 day course of antibiotics total w/ last day 4/15  - pt reports tolerating augmentin well without any issues    Johny Washburn M.D.  [facility], Division of Infectious Diseases  [phone]  After 5pm on weekdays and all day on weekends - please call [phone]  69yo F w/ PMHx of CAD s/p stent x2 in 2007 & 2012  ASA and plavix (last took plavix yesterday) , and 5 episodes of diverticulitis in the past all treated with IV antibiotics. Presented to the ED with sudden onset abdominal pain, she reports that the pain is similar to past episodes in character except reports that this time the pain came on more suddenly. In the past she would have mild pain that  would progressively gets worse but this time it was bad very quickly. She reports taking tylenol at home that gave little relief. She denies fevers, chills, nausea or vomiting.  Found to have diverticulitis with micro perf and start on abx with plan for surgery.    RECOMMENDATIONS  -reviewed with pt and distant PCN rash and has tolerated Zosyn and even recently no issue with augmentin  S/p OR 4/8 for laparoscopic drainage of pelvic abscess   -- follow up OR culture -- NGTD   leukocytosis noted, likely reactive post op - continue to monitor   - c/w Zosyn for now  - if culture remains without growth tomorrow can transition to augmentin 875-125mg bid to complete 10 day course of antibiotics total (w/ at least 5 days after drainage) w/ last day 4/15  - pt reports tolerating augmentin well without any issues    Johny Wasbhurn M.D.  [facility], Division of Infectious Diseases  [phone]  After 5pm on weekdays and all day on weekends - please call [phone]

## 2023-04-11 ENCOUNTER — TRANSCRIPTION ENCOUNTER (OUTPATIENT)
Age: 68
End: 2023-04-11

## 2023-04-11 VITALS
DIASTOLIC BLOOD PRESSURE: 72 MMHG | RESPIRATION RATE: 17 BRPM | SYSTOLIC BLOOD PRESSURE: 125 MMHG | HEART RATE: 63 BPM | OXYGEN SATURATION: 95 % | TEMPERATURE: 98 F

## 2023-04-11 LAB
-  AMIKACIN: SIGNIFICANT CHANGE UP
-  AMIKACIN: SIGNIFICANT CHANGE UP
-  AMOXICILLIN/CLAVULANIC ACID: SIGNIFICANT CHANGE UP
-  AMOXICILLIN/CLAVULANIC ACID: SIGNIFICANT CHANGE UP
-  AMPICILLIN/SULBACTAM: SIGNIFICANT CHANGE UP
-  AMPICILLIN/SULBACTAM: SIGNIFICANT CHANGE UP
-  AMPICILLIN: SIGNIFICANT CHANGE UP
-  AMPICILLIN: SIGNIFICANT CHANGE UP
-  AZTREONAM: SIGNIFICANT CHANGE UP
-  AZTREONAM: SIGNIFICANT CHANGE UP
-  CEFAZOLIN: SIGNIFICANT CHANGE UP
-  CEFAZOLIN: SIGNIFICANT CHANGE UP
-  CEFEPIME: SIGNIFICANT CHANGE UP
-  CEFEPIME: SIGNIFICANT CHANGE UP
-  CEFOXITIN: SIGNIFICANT CHANGE UP
-  CEFOXITIN: SIGNIFICANT CHANGE UP
-  CEFTRIAXONE: SIGNIFICANT CHANGE UP
-  CEFTRIAXONE: SIGNIFICANT CHANGE UP
-  CIPROFLOXACIN: SIGNIFICANT CHANGE UP
-  CIPROFLOXACIN: SIGNIFICANT CHANGE UP
-  ERTAPENEM: SIGNIFICANT CHANGE UP
-  ERTAPENEM: SIGNIFICANT CHANGE UP
-  GENTAMICIN: SIGNIFICANT CHANGE UP
-  GENTAMICIN: SIGNIFICANT CHANGE UP
-  IMIPENEM: SIGNIFICANT CHANGE UP
-  IMIPENEM: SIGNIFICANT CHANGE UP
-  LEVOFLOXACIN: SIGNIFICANT CHANGE UP
-  LEVOFLOXACIN: SIGNIFICANT CHANGE UP
-  MEROPENEM: SIGNIFICANT CHANGE UP
-  MEROPENEM: SIGNIFICANT CHANGE UP
-  PIPERACILLIN/TAZOBACTAM: SIGNIFICANT CHANGE UP
-  PIPERACILLIN/TAZOBACTAM: SIGNIFICANT CHANGE UP
-  TOBRAMYCIN: SIGNIFICANT CHANGE UP
-  TOBRAMYCIN: SIGNIFICANT CHANGE UP
-  TRIMETHOPRIM/SULFAMETHOXAZOLE: SIGNIFICANT CHANGE UP
-  TRIMETHOPRIM/SULFAMETHOXAZOLE: SIGNIFICANT CHANGE UP
ANION GAP SERPL CALC-SCNC: 3 MMOL/L — LOW (ref 5–17)
BUN SERPL-MCNC: 7 MG/DL — SIGNIFICANT CHANGE UP (ref 7–23)
CALCIUM SERPL-MCNC: 8.8 MG/DL — SIGNIFICANT CHANGE UP (ref 8.5–10.1)
CHLORIDE SERPL-SCNC: 107 MMOL/L — SIGNIFICANT CHANGE UP (ref 96–108)
CO2 SERPL-SCNC: 30 MMOL/L — SIGNIFICANT CHANGE UP (ref 22–31)
CREAT SERPL-MCNC: 0.65 MG/DL — SIGNIFICANT CHANGE UP (ref 0.5–1.3)
EGFR: 96 ML/MIN/1.73M2 — SIGNIFICANT CHANGE UP
GLUCOSE SERPL-MCNC: 112 MG/DL — HIGH (ref 70–99)
HCT VFR BLD CALC: 30.5 % — LOW (ref 34.5–45)
HGB BLD-MCNC: 9.8 G/DL — LOW (ref 11.5–15.5)
MAGNESIUM SERPL-MCNC: 2.1 MG/DL — SIGNIFICANT CHANGE UP (ref 1.6–2.6)
MCHC RBC-ENTMCNC: 29.4 PG — SIGNIFICANT CHANGE UP (ref 27–34)
MCHC RBC-ENTMCNC: 32.1 GM/DL — SIGNIFICANT CHANGE UP (ref 32–36)
MCV RBC AUTO: 91.6 FL — SIGNIFICANT CHANGE UP (ref 80–100)
METHOD TYPE: SIGNIFICANT CHANGE UP
METHOD TYPE: SIGNIFICANT CHANGE UP
NRBC # BLD: 0 /100 WBCS — SIGNIFICANT CHANGE UP (ref 0–0)
PHOSPHATE SERPL-MCNC: 3.5 MG/DL — SIGNIFICANT CHANGE UP (ref 2.5–4.5)
PLATELET # BLD AUTO: 323 K/UL — SIGNIFICANT CHANGE UP (ref 150–400)
POTASSIUM SERPL-MCNC: 3.9 MMOL/L — SIGNIFICANT CHANGE UP (ref 3.5–5.3)
POTASSIUM SERPL-SCNC: 3.9 MMOL/L — SIGNIFICANT CHANGE UP (ref 3.5–5.3)
RBC # BLD: 3.33 M/UL — LOW (ref 3.8–5.2)
RBC # FLD: 12.9 % — SIGNIFICANT CHANGE UP (ref 10.3–14.5)
SODIUM SERPL-SCNC: 140 MMOL/L — SIGNIFICANT CHANGE UP (ref 135–145)
WBC # BLD: 7.64 K/UL — SIGNIFICANT CHANGE UP (ref 3.8–10.5)
WBC # FLD AUTO: 7.64 K/UL — SIGNIFICANT CHANGE UP (ref 3.8–10.5)

## 2023-04-11 PROCEDURE — 82962 GLUCOSE BLOOD TEST: CPT

## 2023-04-11 PROCEDURE — 83735 ASSAY OF MAGNESIUM: CPT

## 2023-04-11 PROCEDURE — 80053 COMPREHEN METABOLIC PANEL: CPT

## 2023-04-11 PROCEDURE — 84145 PROCALCITONIN (PCT): CPT

## 2023-04-11 PROCEDURE — 85025 COMPLETE CBC W/AUTO DIFF WBC: CPT

## 2023-04-11 PROCEDURE — 99232 SBSQ HOSP IP/OBS MODERATE 35: CPT

## 2023-04-11 PROCEDURE — 81001 URINALYSIS AUTO W/SCOPE: CPT

## 2023-04-11 PROCEDURE — 88307 TISSUE EXAM BY PATHOLOGIST: CPT

## 2023-04-11 PROCEDURE — 94640 AIRWAY INHALATION TREATMENT: CPT

## 2023-04-11 PROCEDURE — 87070 CULTURE OTHR SPECIMN AEROBIC: CPT

## 2023-04-11 PROCEDURE — 93005 ELECTROCARDIOGRAM TRACING: CPT

## 2023-04-11 PROCEDURE — 88305 TISSUE EXAM BY PATHOLOGIST: CPT

## 2023-04-11 PROCEDURE — C1889: CPT

## 2023-04-11 PROCEDURE — 86900 BLOOD TYPING SEROLOGIC ABO: CPT

## 2023-04-11 PROCEDURE — 87641 MR-STAPH DNA AMP PROBE: CPT

## 2023-04-11 PROCEDURE — 84100 ASSAY OF PHOSPHORUS: CPT

## 2023-04-11 PROCEDURE — 88304 TISSUE EXAM BY PATHOLOGIST: CPT

## 2023-04-11 PROCEDURE — 85610 PROTHROMBIN TIME: CPT

## 2023-04-11 PROCEDURE — 80048 BASIC METABOLIC PNL TOTAL CA: CPT

## 2023-04-11 PROCEDURE — 83690 ASSAY OF LIPASE: CPT

## 2023-04-11 PROCEDURE — 85027 COMPLETE CBC AUTOMATED: CPT

## 2023-04-11 PROCEDURE — 86901 BLOOD TYPING SEROLOGIC RH(D): CPT

## 2023-04-11 PROCEDURE — 87040 BLOOD CULTURE FOR BACTERIA: CPT

## 2023-04-11 PROCEDURE — 87635 SARS-COV-2 COVID-19 AMP PRB: CPT

## 2023-04-11 PROCEDURE — 85730 THROMBOPLASTIN TIME PARTIAL: CPT

## 2023-04-11 PROCEDURE — 87077 CULTURE AEROBIC IDENTIFY: CPT

## 2023-04-11 PROCEDURE — 87186 SC STD MICRODIL/AGAR DIL: CPT

## 2023-04-11 PROCEDURE — 86850 RBC ANTIBODY SCREEN: CPT

## 2023-04-11 PROCEDURE — 99285 EMERGENCY DEPT VISIT HI MDM: CPT | Mod: 25

## 2023-04-11 PROCEDURE — 87637 SARSCOV2&INF A&B&RSV AMP PRB: CPT

## 2023-04-11 PROCEDURE — 87640 STAPH A DNA AMP PROBE: CPT

## 2023-04-11 PROCEDURE — 83605 ASSAY OF LACTIC ACID: CPT

## 2023-04-11 PROCEDURE — 36415 COLL VENOUS BLD VENIPUNCTURE: CPT

## 2023-04-11 PROCEDURE — 74177 CT ABD & PELVIS W/CONTRAST: CPT | Mod: MA

## 2023-04-11 PROCEDURE — 88108 CYTOPATH CONCENTRATE TECH: CPT

## 2023-04-11 RX ORDER — IBUPROFEN 200 MG
1 TABLET ORAL
Qty: 30 | Refills: 0
Start: 2023-04-11

## 2023-04-11 RX ORDER — ACETAMINOPHEN 500 MG
2 TABLET ORAL
Qty: 30 | Refills: 0
Start: 2023-04-11

## 2023-04-11 RX ORDER — OXYCODONE HYDROCHLORIDE 5 MG/1
1 TABLET ORAL
Qty: 10 | Refills: 0
Start: 2023-04-11

## 2023-04-11 RX ADMIN — LOSARTAN POTASSIUM 50 MILLIGRAM(S): 100 TABLET, FILM COATED ORAL at 06:04

## 2023-04-11 RX ADMIN — Medication 40 MILLIEQUIVALENT(S): at 02:03

## 2023-04-11 RX ADMIN — Medication 1 SPRAY(S): at 06:03

## 2023-04-11 RX ADMIN — PIPERACILLIN AND TAZOBACTAM 25 GRAM(S): 4; .5 INJECTION, POWDER, LYOPHILIZED, FOR SOLUTION INTRAVENOUS at 06:03

## 2023-04-11 RX ADMIN — Medication 25 MILLIGRAM(S): at 06:04

## 2023-04-11 RX ADMIN — PANTOPRAZOLE SODIUM 40 MILLIGRAM(S): 20 TABLET, DELAYED RELEASE ORAL at 06:04

## 2023-04-11 RX ADMIN — Medication 400 MILLIGRAM(S): at 00:22

## 2023-04-11 RX ADMIN — Medication 400 MILLIGRAM(S): at 06:03

## 2023-04-11 RX ADMIN — Medication 400 MILLIGRAM(S): at 06:33

## 2023-04-11 RX ADMIN — Medication 400 MILLIGRAM(S): at 00:52

## 2023-04-11 NOTE — PROGRESS NOTE ADULT - REASON FOR ADMISSION
Acute diverticulitis w/ evidence of microperforation

## 2023-04-11 NOTE — PROGRESS NOTE ADULT - PROVIDER SPECIALTY LIST ADULT
Cardiology
Cardiology
Critical Care
Infectious Disease
Surgery
Cardiology
Cardiology
Infectious Disease
Infectious Disease
Surgery
Cardiology
Cardiology
Infectious Disease
Surgery
Surgery
Negative

## 2023-04-11 NOTE — PROGRESS NOTE ADULT - NS ATTEND AMEND GEN_ALL_CORE FT
Optimized for the OR from a cardiac point of view with no evidence of active ischemic heart disease, decompensated heart failure, severe obstructive valvular disease, or uncontrolled arrhythmia.
Optimized for the OR from a cardiac point of view with no evidence of active ischemic heart disease, decompensated heart failure, severe obstructive valvular disease, or uncontrolled arrhythmia.
Continue current management.  D/c planning per primary team.
diverticulitis with possible perf  no cardiac issue to proceeding to the or this week.
s/p abdominal surgery without issue  doing well and tolerating clears  dc planning per primary team
I have personally seen and examined the patient.  I fully participated in the care of this patient.  I have made amendments to the documentation where necessary, and agree with the history, physical exam, impression/assessment, and plan as documented by the PA    hypotension now resolved  H&H stable  tolerating clears  abd softly distended, appropriate ninfa incisional tenderness, incisions c/d/i  -advance to fulls  -restart ASA given cardiac stents  -lovenox  -d/c caruso  -OOB/ambulate

## 2023-04-11 NOTE — PROGRESS NOTE ADULT - ASSESSMENT
68yoF w/ PMHx significant for CAD s/p stent x2 on ASA and plavix (last took plavix 4/3) and multiple episodes of diverticulitis x 5 all treated medically, presenting now with another episode of acute diverticulitis with imaging showing evidence of possible microperforation.    Cardiac optimization, CAD s/p stents, HTN  - s/p Laparoscopic bilateral salpingo-oophorectomy and Laparoscopic low anterior resection with drainage of pelvic abscess on 4/8/22  - Tolerated procedure well without cardiac complication  - Now tolerating regular diet  - Surgery and ID f/u    - BP improved, now mostly controlled.  Pain control per Primary  - Continue Metoprolol and Losartan as tolerated    - ECG shows NSR with no ischemic changes.  No anginal complaints  - Nuc stress test 02/2021 normal myocardial perfusion  - Hx of CAD s/p stent  - Continue ASA  - Continue statin and Zetia  - Okay to continue to hold Plavix    - TTE 02/2021 EF 65%, mild-mod AI  - No evidence of significant volume overload    - DVT ppx Lovenox SQ   - Monitor and replete lytes, keep K>4, Mg>2.  - Stable from cardiac standpoint.  Will continue to follow.  - To follow with Dr. Martinez as outpatient    Kisha Santos DNP, NP-C, AGACNP-C  Cardiology   Spectra #[phone]

## 2023-04-11 NOTE — DISCHARGE NOTE NURSING/CASE MANAGEMENT/SOCIAL WORK - NSDCFUADDAPPT_GEN_ALL_CORE_FT
Call Dr. Graham's office to schedule a post-operative appointment in 2 weeks.     FOR URGENT POSTOPERATIVE PROBLEMS, CALL BELLA AT DR. GRAHAM'S SURGICAL HOTLINE: [phone].    Call Dr. Cardenas's office to schedule a post-operative appointment in 2 weeks.

## 2023-04-11 NOTE — PROGRESS NOTE ADULT - SUBJECTIVE AND OBJECTIVE BOX
[facility] -- Dipti Mane Pannella, Patel, Savella, Goodger  Office # [phone]    Follow Up:      Subjective/Observations:     REVIEW OF SYSTEMS: All other review of systems is negative unless indicated above  PAST MEDICAL & SURGICAL HISTORY:  Cervical cancer  with leep procedure, denies chemo/ radiation      CAD (coronary artery disease)      S/P coronary artery stent placement   in [place] and  in [place]      Hypercholesteremia      Hypertension      Diverticulitis      Torn ligament  right 3rd and 4th metatarsal      GERD (gastroesophageal reflux disease)      Seasonal allergies      Allergy  PCN      Status post bunionectomy  right with hardware      S/P angioplasty with stent   in [place]  and  in [place]      Status post       Cervical cancer  LEEP        MEDICATIONS  (STANDING):  aspirin  chewable 81 milliGRAM(s) Oral daily  enoxaparin Injectable 40 milliGRAM(s) SubCutaneous every 24 hours  ezetimibe 10 milliGRAM(s) Oral daily  fluticasone propionate 50 MICROgram(s)/spray Nasal Spray 1 Spray(s) Both Nostrils <User Schedule>  ibuprofen  Tablet. 400 milliGRAM(s) Oral every 6 hours  losartan 50 milliGRAM(s) Oral daily  metoprolol tartrate 25 milliGRAM(s) Oral two times a day  montelukast 10 milliGRAM(s) Oral daily  pantoprazole    Tablet 40 milliGRAM(s) Oral before breakfast  piperacillin/tazobactam IVPB.. 3.375 Gram(s) IV Intermittent every 8 hours  polyethylene glycol 3350 17 Gram(s) Oral daily  simvastatin 20 milliGRAM(s) Oral at bedtime    MEDICATIONS  (PRN):  aluminum hydroxide/magnesium hydroxide/simethicone Suspension 30 milliLiter(s) Oral every 4 hours PRN Dyspepsia  ondansetron Injectable 4 milliGRAM(s) IV Push every 6 hours PRN Nausea and/or Vomiting  oxyCODONE    IR 5 milliGRAM(s) Oral every 4 hours PRN Moderate Pain (4 - 6)  oxyCODONE    IR 10 milliGRAM(s) Oral every 4 hours PRN Severe Pain (7 - 10)    Allergies    codeine (Vomiting)  Flagyl (Vomiting; Nausea)  penicillin (Rash)    Intolerances      Vital Signs Last 24 Hrs  T(C): 36.7 (2023 04:47), Max: 36.7 (2023 04:47)  T(F): 98 (2023 04:47), Max: 98 (2023 04:47)  HR: 63 (2023 04:47) (63 - 67)  BP: 125/72 (2023 04:47) (125/72 - 158/74)  BP(mean): --  RR: 17 (2023 04:47) (17 - 18)  SpO2: 95% (:47) (94% - 96%)    Parameters below as of 2023 04:47  Patient On (Oxygen Delivery Method): room air      I&O's Summary      PHYSICAL EXAM:  TELE:   Constitutional: NAD, awake and alert, well-developed  HEENT: Moist Mucous Membranes, Anicteric  Pulmonary: Non-labored, breath sounds are clear bilaterally, No wheezing, rales or rhonchi  Cardiovascular: Regular, S1 and S2, No murmurs, rubs, gallops or clicks  Gastrointestinal: Bowel Sounds present, soft, nontender.   Lymph: No peripheral edema. No lymphadenopathy.  Skin: No visible rashes or ulcers.  Psych:  Mood & affect appropriate  LABS: All Labs Reviewed:                        9.8    7.64  )-----------( 323      ( 2023 07:30 )             30.5                         10.0   12.22 )-----------( 319      ( 10 Apr 2023 08:28 )             30.2                         9.6    14.42 )-----------( 301      ( 2023 17:12 )             29.1     10 Apr 2023 08:28    141    |  109    |  8      ----------------------------<  109    3.1     |  26     |  0.67   2023 06:05    136    |  107    |  7      ----------------------------<  227    3.9     |  25     |  0.69   2023 18:35    137    |  107    |  5      ----------------------------<  190    3.3     |  24     |  0.61     Ca    8.9        10 2023 08:28  Ca    8.4        2023 06:05  Ca    8.5        2023 18:35  Phos  2.4       10 2023 08:28  Phos  2.1       2023 06:05  Phos  3.4       2023 18:35  Mg     2.0       10 2023 08:28  Mg     1.9       2023 06:05  Mg     1.8       2023 18:35    TPro  6.6    /  Alb  2.5    /  TBili  0.2    /  DBili  x      /  AST  23     /  ALT  49     /  AlkPhos  95     2023 06:05  TPro  6.7    /  Alb  2.6    /  TBili  0.4    /  DBili  x      /  AST  31     /  ALT  58     /  AlkPhos  107    2023 18:35             Kisha Santos DNP, NP-C, AGACNP-C  Cardiology   Spectra #[phone]      [facility] -- Dipti Mane Pannella, Patel, Savella, Goodger  Office # [phone]    Follow Up:  Cardiac Optimization, HTN, Hx CAD s/p stents    Subjective/Observations: Denies N/V or abdomina pain.  Admits to have tolerated regular diet.  Remains comfortable on RA.  Denies any form of respiratory or cardia discomfort    REVIEW OF SYSTEMS: All other review of systems is negative unless indicated above  PAST MEDICAL & SURGICAL HISTORY:  Cervical cancer  with leep procedure, denies chemo/ radiation  CAD (coronary artery disease)  S/P coronary artery stent placement   in LAD and  in Ramus  Hypercholesteremia  Hypertension  Diverticulitis  Torn ligament  right 3rd and 4th metatarsal  GERD (gastroesophageal reflux disease)  Seasonal allergies  Allergy  PCN  Status post bunionectomy  right with hardware  S/P angioplasty with stent   in L[place]  and  in [facility]  Status post   Cervical cancer  LEEP    MEDICATIONS  (STANDING):  aspirin  chewable 81 milliGRAM(s) Oral daily  enoxaparin Injectable 40 milliGRAM(s) SubCutaneous every 24 hours  ezetimibe 10 milliGRAM(s) Oral daily  fluticasone propionate 50 MICROgram(s)/spray Nasal Spray 1 Spray(s) Both Nostrils <User Schedule>  ibuprofen  Tablet. 400 milliGRAM(s) Oral every 6 hours  losartan 50 milliGRAM(s) Oral daily  metoprolol tartrate 25 milliGRAM(s) Oral two times a day  montelukast 10 milliGRAM(s) Oral daily  pantoprazole    Tablet 40 milliGRAM(s) Oral before breakfast  piperacillin/tazobactam IVPB.. 3.375 Gram(s) IV Intermittent every 8 hours  polyethylene glycol 3350 17 Gram(s) Oral daily  simvastatin 20 milliGRAM(s) Oral at bedtime    MEDICATIONS  (PRN):  aluminum hydroxide/magnesium hydroxide/simethicone Suspension 30 milliLiter(s) Oral every 4 hours PRN Dyspepsia  ondansetron Injectable 4 milliGRAM(s) IV Push every 6 hours PRN Nausea and/or Vomiting  oxyCODONE    IR 5 milliGRAM(s) Oral every 4 hours PRN Moderate Pain (4 - 6)  oxyCODONE    IR 10 milliGRAM(s) Oral every 4 hours PRN Severe Pain (7 - 10)    Allergies    codeine (Vomiting)  Flagyl (Vomiting; Nausea)  penicillin (Rash)    Intolerances    Vital Signs Last 24 Hrs  T(C): 36.7 (2023 04:47), Max: 36.7 (2023 04:47)  T(F): 98 (2023 04:47), Max: 98 (2023 04:47)  HR: 63 (2023 04:47) (63 - 67)  BP: 125/72 (2023 04:47) (125/72 - 158/74)  BP(mean): --  RR: 17 (2023 04:47) (17 - 18)  SpO2: 95% (2023 04:47) (94% - 96%)    Parameters below as of 2023 04:47  Patient On (Oxygen Delivery Method): room air    I&O's Summary      PHYSICAL EXAM:  TELE: Not on tele  Constitutional: NAD, awake and alert, well-developed  HEENT: Moist Mucous Membranes, Anicteric  Pulmonary: Non-labored, breath sounds are clear bilaterally, No wheezing, rales or rhonchi  Cardiovascular: Regular, S1 and S2, No murmurs, rubs, gallops or clicks  Gastrointestinal: Bowel Sounds present, soft, nontender.   Lymph: No peripheral edema. No lymphadenopathy.  Skin: No visible rashes or ulcers.  Postop abdominal incision  Psych:  Mood & affect appropriate    LABS: All Labs Reviewed:                        9.8    7.64  )-----------( 323      ( 2023 07:30 )             30.5                         10.0   12.22 )-----------( 319      ( 10 Apr 2023 08:28 )             30.2                         9.6    14.42 )-----------( 301      ( 2023 17:12 )             29.1     10 Apr 2023 08:28    141    |  109    |  8      ----------------------------<  109    3.1     |  26     |  0.67   2023 06:05    136    |  107    |  7      ----------------------------<  227    3.9     |  25     |  0.69   2023 18:35    137    |  107    |  5      ----------------------------<  190    3.3     |  24     |  0.61     Ca    8.9        10 2023 08:28  Ca    8.4        2023 06:05  Ca    8.5        2023 18:35  Phos  2.4       10 2023 08:28  Phos  2.1       2023 06:05  Phos  3.4       2023 18:35  Mg     2.0       10 Apr 2023 08:28  Mg     1.9       2023 06:05  Mg     1.8       2023 18:35    TPro  6.6    /  Alb  2.5    /  TBili  0.2    /  DBili  x      /  AST  23     /  ALT  49     /  AlkPhos  95     2023 06:05  TPro  6.7    /  Alb  2.6    /  TBili  0.4    /  DBili  x      /  AST  31     /  ALT  58     /  AlkPhos  107    2023 18:35      Ventricular Rate 74 BPM    Atrial Rate 74 BPM    P-R Interval 174 ms    QRS Duration 82 ms    Q-T Interval 414 ms    QTC Calculation(Bazett) 459 ms    P Axis 34 degrees    R Axis -42 degrees    T Axis 53 degrees    Diagnosis Line Normal sinus rhythm  Left axis deviation  Abnormal ECG  When compared with ECG of 18-MAR-2023 11:41,  Nonspecific T wave abnormality now evident in Lateral leads  Confirmed by Franca Waldron ([id number]) on 2023 7:15:27

## 2023-04-11 NOTE — DISCHARGE NOTE NURSING/CASE MANAGEMENT/SOCIAL WORK - PATIENT PORTAL LINK FT
You can access the FollowMyHealth Patient Portal offered by [facility] by registering at the following website: [email or web address]. By joining [facility]’s FollowMyHealth portal, you will also be able to view your health information using other applications (apps) compatible with our system.

## 2023-04-12 ENCOUNTER — NON-APPOINTMENT (OUTPATIENT)
Age: 68
End: 2023-04-12

## 2023-04-13 ENCOUNTER — TRANSCRIPTION ENCOUNTER (OUTPATIENT)
Age: 68
End: 2023-04-13

## 2023-04-13 ENCOUNTER — APPOINTMENT (OUTPATIENT)
Dept: INTERNAL MEDICINE | Facility: CLINIC | Age: 68
End: 2023-04-13
Payer: MEDICARE

## 2023-04-13 PROCEDURE — 99496 TRANSJ CARE MGMT HIGH F2F 7D: CPT | Mod: 95

## 2023-04-13 RX ORDER — AZITHROMYCIN 250 MG/1
250 TABLET, FILM COATED ORAL
Qty: 1 | Refills: 0 | Status: DISCONTINUED | COMMUNITY
Start: 2023-01-19 | End: 2023-04-13

## 2023-04-16 LAB
CULTURE RESULTS: SIGNIFICANT CHANGE UP
ORGANISM # SPEC MICROSCOPIC CNT: SIGNIFICANT CHANGE UP
SPECIMEN SOURCE: SIGNIFICANT CHANGE UP

## 2023-04-17 ENCOUNTER — TRANSCRIPTION ENCOUNTER (OUTPATIENT)
Age: 68
End: 2023-04-17

## 2023-04-18 ENCOUNTER — TRANSCRIPTION ENCOUNTER (OUTPATIENT)
Age: 68
End: 2023-04-18

## 2023-04-20 ENCOUNTER — APPOINTMENT (OUTPATIENT)
Dept: SURGERY | Facility: CLINIC | Age: 68
End: 2023-04-20
Payer: MEDICARE

## 2023-04-20 VITALS
RESPIRATION RATE: 16 BRPM | OXYGEN SATURATION: 98 % | HEART RATE: 76 BPM | SYSTOLIC BLOOD PRESSURE: 123 MMHG | HEIGHT: 61 IN | BODY MASS INDEX: 27.56 KG/M2 | WEIGHT: 146 LBS | DIASTOLIC BLOOD PRESSURE: 83 MMHG

## 2023-04-20 PROCEDURE — 99024 POSTOP FOLLOW-UP VISIT: CPT

## 2023-04-23 ENCOUNTER — TRANSCRIPTION ENCOUNTER (OUTPATIENT)
Age: 68
End: 2023-04-23

## 2023-05-04 ENCOUNTER — APPOINTMENT (OUTPATIENT)
Dept: SURGERY | Facility: CLINIC | Age: 68
End: 2023-05-04
Payer: MEDICARE

## 2023-05-04 VITALS — HEART RATE: 70 BPM | OXYGEN SATURATION: 96 % | DIASTOLIC BLOOD PRESSURE: 83 MMHG | SYSTOLIC BLOOD PRESSURE: 162 MMHG

## 2023-05-04 PROCEDURE — 99024 POSTOP FOLLOW-UP VISIT: CPT

## 2023-05-09 NOTE — H&P PST ADULT - BP NONINVASIVE DIASTOLIC (MM HG)
84 Olumiant Counseling: I discussed with the patient the risks of Olumiant therapy including but not limited to upper respiratory tract infections, shingles, cold sores, and nausea. Live vaccines should be avoided.  This medication has been linked to serious infections; higher rate of mortality; malignancy and lymphoproliferative disorders; major adverse cardiovascular events; thrombosis; gastrointestinal perforations; neutropenia; lymphopenia; anemia; liver enzyme elevations; and lipid elevations.

## 2023-05-12 ENCOUNTER — APPOINTMENT (OUTPATIENT)
Dept: CT IMAGING | Facility: CLINIC | Age: 68
End: 2023-05-12
Payer: MEDICARE

## 2023-05-12 ENCOUNTER — OUTPATIENT (OUTPATIENT)
Dept: OUTPATIENT SERVICES | Facility: HOSPITAL | Age: 68
LOS: 1 days | End: 2023-05-12
Payer: MEDICARE

## 2023-05-12 DIAGNOSIS — C53.9 MALIGNANT NEOPLASM OF CERVIX UTERI, UNSPECIFIED: Chronic | ICD-10-CM

## 2023-05-12 DIAGNOSIS — R91.1 SOLITARY PULMONARY NODULE: ICD-10-CM

## 2023-05-12 PROCEDURE — 71250 CT THORAX DX C-: CPT | Mod: 26

## 2023-05-12 PROCEDURE — 71250 CT THORAX DX C-: CPT

## 2023-05-22 ENCOUNTER — RX RENEWAL (OUTPATIENT)
Age: 68
End: 2023-05-22

## 2023-05-25 ENCOUNTER — TRANSCRIPTION ENCOUNTER (OUTPATIENT)
Age: 68
End: 2023-05-25

## 2023-05-26 ENCOUNTER — APPOINTMENT (OUTPATIENT)
Dept: ORTHOPEDIC SURGERY | Facility: CLINIC | Age: 68
End: 2023-05-26
Payer: MEDICARE

## 2023-05-26 VITALS — HEIGHT: 61 IN | BODY MASS INDEX: 27.56 KG/M2 | WEIGHT: 146 LBS

## 2023-05-26 DIAGNOSIS — M65.312 TRIGGER THUMB, LEFT THUMB: ICD-10-CM

## 2023-05-26 PROCEDURE — 20550 NJX 1 TENDON SHEATH/LIGAMENT: CPT | Mod: LT

## 2023-05-26 PROCEDURE — 99203 OFFICE O/P NEW LOW 30 MIN: CPT | Mod: 25

## 2023-05-26 NOTE — DISCUSSION/SUMMARY
[de-identified] : Discussed the nature of the diagnosis and risk and benefits of different modalities of treatment.\par Discussed conservative management vs CSI. \par Pt would like another CSI. This will #3. (first 2 by outside provider) \par Done today and tolerated well.\par All questions answered.\par

## 2023-05-26 NOTE — HISTORY OF PRESENT ILLNESS
[9] : 9 [8] : 8 [Part time] : Work status: part time [de-identified] : 68 year old female presenting with LEFT thumb pain, stiffness and clicking for the past 5-6 months. Worse in the morning. No injury/trauma. She has had 2 injections. The last injection was about 3-4 months ago.  [] : no [FreeTextEntry1] : left thumb [FreeTextEntry5] : 67yo RHD female presenting with left thumb pain. Reports past CSI in February/March 2023 from previous orthopedic. States clicking and sharp pain. Has applied Voltaren with relief.  [de-identified] : OBGYN office  No

## 2023-05-26 NOTE — PROCEDURE
[Tendon Sheath] : tendon sheath [Left] : of the left [1st] : 1st trigger finger [Pain] : pain [Inflammation] : inflammation [Alcohol] : alcohol [Ethyl Chloride sprayed topically] : ethyl chloride sprayed topically [Sterile technique used] : sterile technique used [___ cc    1%] : Lidocaine ~Vcc of 1%  [___ cc    10mg] : Triamcinolone (Kenalog) ~Vcc of 10 mg  [] : Patient tolerated procedure well [Risks, benefits, alternatives discussed / Verbal consent obtained] : the risks benefits, and alternatives have been discussed, and verbal consent was obtained

## 2023-05-31 ENCOUNTER — TRANSCRIPTION ENCOUNTER (OUTPATIENT)
Age: 68
End: 2023-05-31

## 2023-06-16 ENCOUNTER — APPOINTMENT (OUTPATIENT)
Dept: CARDIOLOGY | Facility: CLINIC | Age: 68
End: 2023-06-16
Payer: MEDICARE

## 2023-06-16 ENCOUNTER — NON-APPOINTMENT (OUTPATIENT)
Age: 68
End: 2023-06-16

## 2023-06-16 VITALS
BODY MASS INDEX: 27.75 KG/M2 | HEIGHT: 61 IN | HEART RATE: 68 BPM | WEIGHT: 147 LBS | SYSTOLIC BLOOD PRESSURE: 169 MMHG | OXYGEN SATURATION: 98 % | DIASTOLIC BLOOD PRESSURE: 84 MMHG

## 2023-06-16 PROCEDURE — 99215 OFFICE O/P EST HI 40 MIN: CPT

## 2023-06-16 PROCEDURE — 93000 ELECTROCARDIOGRAM COMPLETE: CPT

## 2023-06-16 NOTE — PATIENT PROFILE ADULT - MONEY FOR FOOD
Impression: Hypermetropia, bilateral: H52.03. Current lenses are scratched Plan: A glasses prescription has been discussed and generated. Advise pt to hold off o filling new Rx until after she sees retina specialist, in case she does need surgery. Patient to call with any concerns. no

## 2023-06-21 NOTE — DISCHARGE NOTE PROVIDER - PROVIDER TOKENS
Patient called requesting to speak with Dr Escoto sts she is a lot of pain-    PROVIDER:[TOKEN:[[id number]],FOLLOWUP:[1 week],ESTABLISHEDPATIENT:[T]],PROVIDER:[TOKEN:[[id number]],FOLLOWUP:[1 week],ESTABLISHEDPATIENT:[T]]

## 2023-07-10 NOTE — H&P PST ADULT - NSANTHAGERD_ENT_A_CORE
[FreeTextEntry1] : FRANCO RIVERO returns to the office today. He is a 67 year old man with a history of kidney stones. He had undergone surgery here years ago to manage them. At baseline he garces snot have any significant urinary complaints including urgency or frequency. He has nocturia x1. Denies hesitancy, incomplete bladder emptying, hematuria, or dysuria. \par  \par He recently underwent a right partial nephrectomy with Dr. Miranda in April. Pathology showed Clear cell RCC pT1a, grade 2. Yes

## 2023-07-17 ENCOUNTER — APPOINTMENT (OUTPATIENT)
Dept: INTERNAL MEDICINE | Facility: CLINIC | Age: 68
End: 2023-07-17

## 2023-07-24 ENCOUNTER — LABORATORY RESULT (OUTPATIENT)
Age: 68
End: 2023-07-24

## 2023-07-24 ENCOUNTER — APPOINTMENT (OUTPATIENT)
Dept: INTERNAL MEDICINE | Facility: CLINIC | Age: 68
End: 2023-07-24
Payer: MEDICARE

## 2023-07-24 VITALS
TEMPERATURE: 98.4 F | SYSTOLIC BLOOD PRESSURE: 187 MMHG | BODY MASS INDEX: 28.7 KG/M2 | DIASTOLIC BLOOD PRESSURE: 93 MMHG | HEART RATE: 71 BPM | WEIGHT: 152 LBS | OXYGEN SATURATION: 96 % | HEIGHT: 61 IN

## 2023-07-24 VITALS — DIASTOLIC BLOOD PRESSURE: 60 MMHG | SYSTOLIC BLOOD PRESSURE: 132 MMHG

## 2023-07-24 DIAGNOSIS — Z90.49 ACQUIRED ABSENCE OF OTHER SPECIFIED PARTS OF DIGESTIVE TRACT: ICD-10-CM

## 2023-07-24 DIAGNOSIS — M85.80 OTHER SPECIFIED DISORDERS OF BONE DENSITY AND STRUCTURE, UNSPECIFIED SITE: ICD-10-CM

## 2023-07-24 DIAGNOSIS — Z85.41 PERSONAL HISTORY OF MALIGNANT NEOPLASM OF CERVIX UTERI: ICD-10-CM

## 2023-07-24 DIAGNOSIS — Z00.00 ENCOUNTER FOR GENERAL ADULT MEDICAL EXAMINATION W/OUT ABNORMAL FINDINGS: ICD-10-CM

## 2023-07-24 PROCEDURE — 99214 OFFICE O/P EST MOD 30 MIN: CPT | Mod: 25

## 2023-07-24 PROCEDURE — 36415 COLL VENOUS BLD VENIPUNCTURE: CPT

## 2023-07-24 PROCEDURE — G0402 INITIAL PREVENTIVE EXAM: CPT

## 2023-07-24 RX ORDER — AMOXICILLIN AND CLAVULANATE POTASSIUM 875; 125 MG/1; 1/1
875-125 TABLET, FILM COATED ORAL
Refills: 0 | Status: DISCONTINUED | COMMUNITY
End: 2023-07-24

## 2023-07-25 ENCOUNTER — TRANSCRIPTION ENCOUNTER (OUTPATIENT)
Age: 68
End: 2023-07-25

## 2023-07-25 LAB
25(OH)D3 SERPL-MCNC: 44.2 NG/ML
ALBUMIN SERPL ELPH-MCNC: 4.6 G/DL
ALP BLD-CCNC: 92 U/L
ALT SERPL-CCNC: 21 U/L
ANION GAP SERPL CALC-SCNC: 11 MMOL/L
APPEARANCE: CLEAR
AST SERPL-CCNC: 19 U/L
BILIRUB SERPL-MCNC: 0.3 MG/DL
BILIRUBIN URINE: NEGATIVE
BLOOD URINE: ABNORMAL
BUN SERPL-MCNC: 19 MG/DL
CALCIUM SERPL-MCNC: 9.6 MG/DL
CHLORIDE SERPL-SCNC: 103 MMOL/L
CHOLEST SERPL-MCNC: 185 MG/DL
CO2 SERPL-SCNC: 26 MMOL/L
COLOR: YELLOW
CREAT SERPL-MCNC: 0.65 MG/DL
CREAT SPEC-SCNC: 45 MG/DL
EGFR: 96 ML/MIN/1.73M2
ESTIMATED AVERAGE GLUCOSE: 126 MG/DL
GLUCOSE QUALITATIVE U: NEGATIVE MG/DL
GLUCOSE SERPL-MCNC: 94 MG/DL
HBA1C MFR BLD HPLC: 6 %
HDLC SERPL-MCNC: 78 MG/DL
KETONES URINE: NEGATIVE MG/DL
LDLC SERPL CALC-MCNC: 92 MG/DL
LEUKOCYTE ESTERASE URINE: NEGATIVE
MICROALBUMIN 24H UR DL<=1MG/L-MCNC: <1.2 MG/DL
MICROALBUMIN/CREAT 24H UR-RTO: NORMAL MG/G
NITRITE URINE: NEGATIVE
NONHDLC SERPL-MCNC: 107 MG/DL
PH URINE: 5.5
POTASSIUM SERPL-SCNC: 4.6 MMOL/L
PROT SERPL-MCNC: 7.2 G/DL
PROTEIN URINE: NEGATIVE MG/DL
SODIUM SERPL-SCNC: 141 MMOL/L
SPECIFIC GRAVITY URINE: 1.02
TRIGL SERPL-MCNC: 81 MG/DL
TSH SERPL-ACNC: 0.88 UIU/ML
UROBILINOGEN URINE: 0.2 MG/DL

## 2023-08-07 NOTE — PATIENT PROFILE ADULT - FUNCTIONAL ASSESSMENT - DAILY ACTIVITY 4.
4 = No assist / stand by assistance Azelaic Acid Counseling: Patient counseled that medicine may cause skin irritation and to avoid applying near the eyes.  In the event of skin irritation, the patient was advised to reduce the amount of the drug applied or use it less frequently.   The patient verbalized understanding of the proper use and possible adverse effects of azelaic acid.  All of the patient's questions and concerns were addressed.

## 2023-09-18 NOTE — ED PROVIDER NOTE - SKIN, MLM
Problem: At Risk for Falls  Goal: # Patient does not fall  Outcome: Outcome Met, Continue evaluating goal progress toward completion  Goal: # Takes action to control fall-related risks  Outcome: Outcome Met, Continue evaluating goal progress toward completion  Goal: # Verbalizes understanding of fall risk/precautions  Description: Document education using the patient education activity  Outcome: Outcome Met, Continue evaluating goal progress toward completion     Problem: Activity Intolerance  Goal: # Functional status is maintained or returned to baseline  Outcome: Outcome Met, Continue evaluating goal progress toward completion  Goal: # Tolerates activity for d/c setting with no clinical problems  Outcome: Outcome Met, Continue evaluating goal progress toward completion     Problem: Impaired Physical Mobility  Goal: # Bed mobility, ambulation, and ADLs are maintained or returned to baseline during hospitalization  Outcome: Outcome Met, Continue evaluating goal progress toward completion     Problem: Fluid Volume Excess  Goal: # Fluid Volume Excess Symptoms Resolved  Description: Treatment often consists of oxygen and respiratory support with diuretic therapy at doses that exceed usual dose (typically doubled).  Monitor patient response to treatment.    Acute dyspnea should resolve quickly if dose is adequate and kidney function is adequate. Dyspnea/SOB should only be observed with Activity after effective treatment. Patient should be able to lie down comfortably, without SOB.  Outcome: Outcome Met, Continue evaluating goal progress toward completion  Goal: # Oxygenation is maintained (SpO2 greater than or equal to 90% or as ordered)  Outcome: Outcome Met, Continue evaluating goal progress toward completion  Goal: # Verbalizes understanding of FVE management plan  Description: Document on Patient Education Activity  Outcome: Outcome Met, Continue evaluating goal progress toward completion     Problem: Pressure  Injury, Risk for  Goal: # Skin remains intact  Outcome: Outcome Met, Continue evaluating goal progress toward completion  Goal: No new pressure injury (PI) development  Outcome: Outcome Met, Continue evaluating goal progress toward completion  Goal: # Verbalizes understanding of PI risk factors and prevention strategies  Description: Document education using the patient education activity.   Outcome: Outcome Met, Continue evaluating goal progress toward completion  Goal: Comfort optimized with pressure injury prevention strategies guided by patient/family preference. (Hospice)  Outcome: Outcome Met, Continue evaluating goal progress toward completion     Problem: Diabetes  Goal: Achieves glycemic balance  Description: Goal is to maintain blood sugar within range with no episodes of hypoglycemia  Outcome: Outcome Met, Continue evaluating goal progress toward completion  Goal: Verbalizes/demonstrates understanding of NEW diagnosis of diabetes and management  Description: Document on Patient Education Activity  Outcome: Outcome Met, Continue evaluating goal progress toward completion  Goal: Verbalizes understanding of diabetes management including how to use HbA1C to evaluate status of blood sugar over time (Diabetes is NOT a new diagnosis)  Description: Diabetes Education  Outcome: Outcome Met, Continue evaluating goal progress toward completion  Goal: Demonstrates ability to self-administer insulin  Description: Document on Patient Education Activity  Outcome: Outcome Met, Continue evaluating goal progress toward completion     Problem: Breathing Pattern Ineffective  Goal: Air exchange is effective, demonstrated by Sp02 sat of greater then or = 92% (or as ordered)  Outcome: Outcome Met, Continue evaluating goal progress toward completion  Goal: Respiratory pattern is quiet and regular without report of SOB  Outcome: Outcome Met, Continue evaluating goal progress toward completion  Goal: Breathing pattern demonstrates  minimal apnea during sleep with appropriate use of airway pressure support devices  Outcome: Outcome Met, Continue evaluating goal progress toward completion  Goal: Verbalizes/demonstrates effective breathing management strategies  Description: Document education using the patient education activity.   Outcome: Outcome Met, Continue evaluating goal progress toward completion  Goal: Minimize respiratory effort related to dyspnea/shortness of breath (Hospice)  Outcome: Outcome Met, Continue evaluating goal progress toward completion     Problem: VTE, Risk for  Goal: # No s/s of VTE  Outcome: Outcome Met, Continue evaluating goal progress toward completion  Goal: # Verbalizes understanding of VTE risk factors and prevention  Description: Document education using the patient education activity.   Outcome: Outcome Met, Continue evaluating goal progress toward completion  Goal: Demonstrates ability to administer injectable anticoagulants if ordered for d/c  Description: Document education using the patient education activity.  Outcome: Outcome Met, Continue evaluating goal progress toward completion     Problem: Dysphagia  Goal: # Exhibits no s/s of aspiration  Description: Symptoms of aspiration include coughing, choking, throat clearing, change in voice quality, and/or a decrease in oxygen saturation by more than 2% points from baseline after swallowing.  Outcome: Outcome Met, Continue evaluating goal progress toward completion  Goal: # Verbalizes understanding of dysphagia management  Description: Document education using the patient education activity.  Outcome: Outcome Met, Continue evaluating goal progress toward completion      Skin normal color for race, warm, dry and intact. No evidence of rash.

## 2023-09-22 ENCOUNTER — RX RENEWAL (OUTPATIENT)
Age: 68
End: 2023-09-22

## 2023-09-22 PROBLEM — R91.1 INCIDENTAL PULMONARY NODULE, > 3MM AND < 8MM: Status: ACTIVE | Noted: 2023-04-13

## 2023-09-26 NOTE — DISCHARGE NOTE PROVIDER - DISCHARGE SERVICE FOR PATIENT
Is This A New Presentation, Or A Follow-Up?: Skin Lesion How Severe Is Your Skin Lesion?: moderate Additional History: This is an in between appointment. on the discharge service for the patient. I have reviewed and made amendments to the documentation where necessary.

## 2023-09-28 ENCOUNTER — APPOINTMENT (OUTPATIENT)
Dept: GASTROENTEROLOGY | Facility: CLINIC | Age: 68
End: 2023-09-28

## 2023-09-28 DIAGNOSIS — R91.1 SOLITARY PULMONARY NODULE: ICD-10-CM

## 2023-10-02 ENCOUNTER — RX RENEWAL (OUTPATIENT)
Age: 68
End: 2023-10-02

## 2023-10-03 ENCOUNTER — RX RENEWAL (OUTPATIENT)
Age: 68
End: 2023-10-03

## 2023-10-03 ENCOUNTER — APPOINTMENT (OUTPATIENT)
Dept: GASTROENTEROLOGY | Facility: CLINIC | Age: 68
End: 2023-10-03
Payer: MEDICARE

## 2023-10-03 VITALS
HEIGHT: 61 IN | OXYGEN SATURATION: 98 % | RESPIRATION RATE: 18 BRPM | SYSTOLIC BLOOD PRESSURE: 168 MMHG | BODY MASS INDEX: 28.32 KG/M2 | HEART RATE: 65 BPM | WEIGHT: 150 LBS | DIASTOLIC BLOOD PRESSURE: 88 MMHG

## 2023-10-03 DIAGNOSIS — Z87.19 PERSONAL HISTORY OF OTHER DISEASES OF THE DIGESTIVE SYSTEM: ICD-10-CM

## 2023-10-03 PROCEDURE — 99214 OFFICE O/P EST MOD 30 MIN: CPT

## 2023-10-06 ENCOUNTER — TRANSCRIPTION ENCOUNTER (OUTPATIENT)
Age: 68
End: 2023-10-06

## 2023-10-23 ENCOUNTER — RX RENEWAL (OUTPATIENT)
Age: 68
End: 2023-10-23

## 2023-10-23 RX ORDER — LOSARTAN POTASSIUM 50 MG/1
50 TABLET, FILM COATED ORAL DAILY
Qty: 90 | Refills: 1 | Status: ACTIVE | COMMUNITY
Start: 2021-07-19 | End: 1900-01-01

## 2023-11-10 ENCOUNTER — APPOINTMENT (OUTPATIENT)
Dept: INTERNAL MEDICINE | Facility: CLINIC | Age: 68
End: 2023-11-10

## 2023-11-12 NOTE — ED ADULT NURSE NOTE - CAS TRG GEN SKIN CONDITION
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 Pending surgical evaluation for CABG as well as MVR. 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 Pending surgical evaluation for CABG as well as MVR. 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 Pending surgical evaluation for CABG as well as MVR.

## 2023-11-16 NOTE — H&P PST ADULT - VENOUS THROMBOEMBOLISM
Please  add the  following  MD  as  FMD    Dr. Kelly Isaacs  [facility]  [phone]    Patient  reports  MD  is  affilated  with  [facility]    Thanks
no

## 2023-11-27 ENCOUNTER — OUTPATIENT (OUTPATIENT)
Dept: OUTPATIENT SERVICES | Facility: HOSPITAL | Age: 68
LOS: 1 days | End: 2023-11-27
Payer: MEDICARE

## 2023-11-27 ENCOUNTER — APPOINTMENT (OUTPATIENT)
Dept: MAMMOGRAPHY | Facility: CLINIC | Age: 68
End: 2023-11-27
Payer: MEDICARE

## 2023-11-27 ENCOUNTER — APPOINTMENT (OUTPATIENT)
Dept: RADIOLOGY | Facility: CLINIC | Age: 68
End: 2023-11-27
Payer: MEDICARE

## 2023-11-27 DIAGNOSIS — Z00.8 ENCOUNTER FOR OTHER GENERAL EXAMINATION: ICD-10-CM

## 2023-11-27 DIAGNOSIS — C53.9 MALIGNANT NEOPLASM OF CERVIX UTERI, UNSPECIFIED: Chronic | ICD-10-CM

## 2023-11-27 PROCEDURE — 77080 DXA BONE DENSITY AXIAL: CPT | Mod: 26

## 2023-11-27 PROCEDURE — 77063 BREAST TOMOSYNTHESIS BI: CPT | Mod: 26

## 2023-11-27 PROCEDURE — 77067 SCR MAMMO BI INCL CAD: CPT

## 2023-11-27 PROCEDURE — 77063 BREAST TOMOSYNTHESIS BI: CPT

## 2023-11-27 PROCEDURE — 77067 SCR MAMMO BI INCL CAD: CPT | Mod: 26

## 2023-11-27 PROCEDURE — 77080 DXA BONE DENSITY AXIAL: CPT

## 2023-12-11 ENCOUNTER — APPOINTMENT (OUTPATIENT)
Dept: INTERNAL MEDICINE | Facility: CLINIC | Age: 68
End: 2023-12-11
Payer: MEDICARE

## 2023-12-11 DIAGNOSIS — G47.00 INSOMNIA, UNSPECIFIED: ICD-10-CM

## 2023-12-11 DIAGNOSIS — F41.8 OTHER SPECIFIED ANXIETY DISORDERS: ICD-10-CM

## 2023-12-11 PROCEDURE — 99214 OFFICE O/P EST MOD 30 MIN: CPT | Mod: 95

## 2023-12-11 RX ORDER — ALPRAZOLAM 0.25 MG/1
0.25 TABLET ORAL
Qty: 15 | Refills: 0 | Status: ACTIVE | COMMUNITY
Start: 2023-12-11 | End: 1900-01-01

## 2023-12-11 RX ORDER — GLUCOSAMINE/MSM/CHONDROIT SULF 500-166.6
10 TABLET ORAL
Refills: 0 | Status: ACTIVE | COMMUNITY

## 2023-12-15 ENCOUNTER — APPOINTMENT (OUTPATIENT)
Dept: CARDIOLOGY | Facility: CLINIC | Age: 68
End: 2023-12-15
Payer: MEDICARE

## 2023-12-15 ENCOUNTER — NON-APPOINTMENT (OUTPATIENT)
Age: 68
End: 2023-12-15

## 2023-12-15 VITALS
HEIGHT: 61 IN | SYSTOLIC BLOOD PRESSURE: 144 MMHG | OXYGEN SATURATION: 98 % | HEART RATE: 72 BPM | DIASTOLIC BLOOD PRESSURE: 78 MMHG

## 2023-12-15 PROCEDURE — 99214 OFFICE O/P EST MOD 30 MIN: CPT

## 2023-12-15 PROCEDURE — 93000 ELECTROCARDIOGRAM COMPLETE: CPT

## 2023-12-15 NOTE — HISTORY OF PRESENT ILLNESS
[FreeTextEntry1] : This is a 68 year old woman with a history of CAD s/p PCI to the proximal LAD with a Taxus stent in 2007, PCI to the RI in March of 2012 in the setting of a mild NSTEMI, hyperlipidemia, and hypertension presents to the office for a follow up visit.   Form a cardiac perspective, she has been feeling well since her last visit.  She had LE edema on amlodipine, and has stopped it.  Her BP is controlled when she checks it at home.   She is denying chest pain or exertional dyspnea.    She denies dyspnea, PND, orthopnea, LE swelling, dizziness, or syncope.  She has no further myalgia on her current dose of simvastatin.  Her LDL is controlled.   She had reflux type chest pain that resolved when she eliminated red onions from her salad.

## 2023-12-15 NOTE — PHYSICAL EXAM
Problem: Falls - Risk of:  Goal: Will remain free from falls  Description: Will remain free from falls  11/21/2021 1419 by Elyse Mccray RN  Outcome: Met This Shift  11/21/2021 0051 by Allegra Christie RN  Outcome: Met This Shift  Goal: Absence of physical injury  Description: Absence of physical injury  11/21/2021 1419 by Elyse Mccray RN  Outcome: Met This Shift  11/21/2021 0051 by Allegra Christie RN  Outcome: Met This Shift     Problem: Discharge Planning:  Goal: Participates in care planning  Description: Participates in care planning  11/21/2021 0051 by Allegra Christie RN  Outcome: Met This Shift  Goal: Discharged to appropriate level of care  Description: Discharged to appropriate level of care  11/21/2021 0051 by Allegra Christie RN  Outcome: Met This Shift     Problem: Activity Intolerance:  Goal: Ability to tolerate increased activity will improve  Description: Ability to tolerate increased activity will improve  11/21/2021 1419 by Elyse Mccray RN  Outcome: Met This Shift  11/21/2021 0051 by Allegra Christie RN  Outcome: Met This Shift     Problem: Anxiety/Stress:  Goal: Level of anxiety will decrease  Description: Level of anxiety will decrease  11/21/2021 1419 by Elyse Mccray RN  Outcome: Met This Shift  11/21/2021 0051 by Allegra Christie RN  Outcome: Met This Shift     Problem:  Bowel Function - Altered:  Goal: Bowel elimination is within specified parameters  Description: Bowel elimination is within specified parameters  11/21/2021 0051 by Allegra Christie RN  Outcome: Met This Shift     Problem: Fluid Volume - Deficit:  Goal: Absence of fluid volume deficit signs and symptoms  Description: Absence of fluid volume deficit signs and symptoms  11/21/2021 1419 by Elyse Mccray RN  Outcome: Met This Shift  11/21/2021 0051 by Allegra Christie RN  Outcome: Met This Shift  Goal: Electrolytes within specified parameters  Description: Electrolytes within specified parameters  11/21/2021 0051 by Rigo Simpson RN  Outcome: Met This Shift     Problem: Mental Status - Impaired:  Goal: Absence of physical injury  Description: Absence of physical injury  11/21/2021 1419 by Clotilde Francis RN  Outcome: Met This Shift  11/21/2021 0051 by Rigo Simpson RN  Outcome: Met This Shift  Goal: Absence of continued neurological deterioration signs and symptoms  Description: Absence of continued neurological deterioration signs and symptoms  11/21/2021 1419 by Clotilde Francis RN  Outcome: Met This Shift  11/21/2021 0051 by Rigo Simpson RN  Outcome: Met This Shift  Goal: Mental status will be restored to baseline  Description: Mental status will be restored to baseline  11/21/2021 1419 by Clotilde Francis RN  Outcome: Met This Shift  11/21/2021 0051 by Rigo Simpson RN  Outcome: Met This Shift     Problem: Mobility - Impaired:  Goal: Mobility will improve to maximum level  Description: Mobility will improve to maximum level  11/21/2021 1419 by Clotilde Francis RN  Outcome: Met This Shift  11/21/2021 0051 by Rigo Simpson RN  Outcome: Met This Shift     Problem: Nutrition Deficit:  Goal: Ability to achieve adequate nutritional intake will improve  Description: Ability to achieve adequate nutritional intake will improve  11/21/2021 1419 by Clotilde Francis RN  Outcome: Met This Shift  11/21/2021 0051 by Rigo Simpson RN  Outcome: Met This Shift     Problem: Pain:  Goal: Pain level will decrease  Description: Pain level will decrease  11/21/2021 1419 by Clotilde Francis RN  Outcome: Met This Shift  11/21/2021 0051 by Rigo Simpson RN  Outcome: Met This Shift  Goal: Ability to notify healthcare provider of pain before it becomes unmanageable or unbearable will improve  Description: Ability to notify healthcare provider of pain before it becomes unmanageable or unbearable will improve  11/21/2021 0051 by Torie Nino RN  Outcome: Met This Shift  Goal: Control of acute pain  Description: Control of acute pain  11/21/2021 0051 by Torie Nino RN  Outcome: Met This Shift  Goal: Control of chronic pain  Description: Control of chronic pain  11/21/2021 0051 by Torie Nino RN  Outcome: Met This Shift     Problem: Serum Glucose Level - Abnormal:  Goal: Ability to maintain appropriate glucose levels will improve to within specified parameters  Description: Ability to maintain appropriate glucose levels will improve to within specified parameters  11/21/2021 1419 by Kaylee Scherer RN  Outcome: Met This Shift  11/21/2021 0051 by Torie Nino RN  Outcome: Met This Shift     Problem: Skin Integrity - Impaired:  Goal: Will show no infection signs and symptoms  Description: Will show no infection signs and symptoms  11/21/2021 1419 by Kaylee Scherer RN  Outcome: Met This Shift  11/21/2021 0051 by Torie Nino RN  Outcome: Met This Shift  Goal: Absence of new skin breakdown  Description: Absence of new skin breakdown  11/21/2021 1419 by Kaylee Scherer RN  Outcome: Met This Shift  11/21/2021 0051 by Torie Nino RN  Outcome: Met This Shift     Problem: Sleep Pattern Disturbance:  Goal: Appears well-rested  Description: Appears well-rested  11/21/2021 0051 by Torie Nino RN  Outcome: Met This Shift [Normal Appearance] : normal appearance [Well Groomed] : well groomed [General Appearance - In No Acute Distress] : no acute distress [Normal Conjunctiva] : the conjunctiva exhibited no abnormalities [Eyelids - No Xanthelasma] : the eyelids demonstrated no xanthelasmas [Normal Oral Mucosa] : normal oral mucosa [No Oral Pallor] : no oral pallor [Normal Oropharynx] : normal oropharynx [Normal Jugular Venous V Waves Present] : normal jugular venous V waves present [Respiration, Rhythm And Depth] : normal respiratory rhythm and effort [Exaggerated Use Of Accessory Muscles For Inspiration] : no accessory muscle use [Auscultation Breath Sounds / Voice Sounds] : lungs were clear to auscultation bilaterally [Bowel Sounds] : normal bowel sounds [Abdomen Soft] : soft [Abdomen Tenderness] : non-tender [Abnormal Walk] : normal gait [Nail Clubbing] : no clubbing of the fingernails [Cyanosis, Localized] : no localized cyanosis [Petechial Hemorrhages (___cm)] : no petechial hemorrhages [Skin Color & Pigmentation] : normal skin color and pigmentation [] : no rash [Oriented To Time, Place, And Person] : oriented to person, place, and time [Affect] : the affect was normal [Mood] : the mood was normal [No Anxiety] : not feeling anxious [5th Left ICS - MCL] : palpated at the 5th LICS in the midclavicular line [Normal] : normal [No Precordial Heave] : no precordial heave was noted [Normal Rate] : normal [Rhythm Regular] : regular [Normal S1] : normal S1 [Normal S2] : normal S2 [No Murmur] : no murmurs heard [2+] : left 2+ [No Abnormalities] : the abdominal aorta was not enlarged and no bruit was heard [No Pitting Edema] : no pitting edema present [FreeTextEntry1] : No JVD. [S3] : no S3 [Right Carotid Bruit] : no bruit heard over the right carotid [Left Carotid Bruit] : no bruit heard over the left carotid [Bruit] : no bruit heard

## 2023-12-15 NOTE — DISCUSSION/SUMMARY
[FreeTextEntry1] : This is a 68 year old woman with a history of CAD s/p PCI to the proximal LAD with a Taxus stent in 2007, PCI to the RI in March of 2012 in the setting of a mild NSTEMI, hyperlipidemia, and hypertension presents to the office for a follow up visit.   Form a cardiac perspective, she has been feeling well since her last visit.  She will continue DAPT with aspirin and Plavix.  Her pressure is controlled when checked at home.  She will continue Toprol 50 QD and losartan 50 QD.   She needs to get her weight back down, and start exercising, which she knows.   She is tolerating simvastatin, and her most recent LDL was at goal.  She will continue her current dose of Zetia.  She is to continue dual antiplatelet therapy.  We discussed the importance of a healthy diet, regular exercise, and weight loss.   Her chest burning resolved with eliminating red onions.  She will call me if it recurs.  [EKG obtained to assist in diagnosis and management of assessed problem(s)] : EKG obtained to assist in diagnosis and management of assessed problem(s) Clear

## 2023-12-15 NOTE — REASON FOR VISIT
[Follow-Up - Clinic] : a clinic follow-up of [Chest Pain] : chest pain [Coronary Artery Disease] : coronary artery disease [Hyperlipidemia] : hyperlipidemia [Hypertension] : hypertension [Medication Management] : Medication management

## 2023-12-15 NOTE — CARDIOLOGY SUMMARY
[No Ischemia] : no Ischemia [___] : [unfilled] [LVEF ___%] : LVEF [unfilled]% [None] : normal LV function [Normal] : normal LA size [Mild] : mild mitral regurgitation [___] : [unfilled] [de-identified] : NSR with no acute changes. [de-identified] : 2/2021:  No evidence of ischemia.  [de-identified] : 2/2021:  Normal LV function

## 2024-01-04 ENCOUNTER — APPOINTMENT (OUTPATIENT)
Dept: INTERNAL MEDICINE | Facility: CLINIC | Age: 69
End: 2024-01-04
Payer: MEDICARE

## 2024-01-04 DIAGNOSIS — I21.9 ACUTE MYOCARDIAL INFARCTION, UNSPECIFIED: ICD-10-CM

## 2024-01-04 PROCEDURE — 99214 OFFICE O/P EST MOD 30 MIN: CPT | Mod: 95

## 2024-01-04 RX ORDER — METHYLPREDNISOLONE 4 MG/1
4 TABLET ORAL
Qty: 21 | Refills: 0 | Status: ACTIVE | COMMUNITY
Start: 2024-01-04 | End: 1900-01-01

## 2024-01-04 RX ORDER — DOXYCYCLINE HYCLATE 100 MG/1
100 CAPSULE ORAL
Qty: 20 | Refills: 0 | Status: ACTIVE | COMMUNITY
Start: 2024-01-04 | End: 1900-01-01

## 2024-01-04 NOTE — HISTORY OF PRESENT ILLNESS
[Home] : at home, [unfilled] , at the time of the visit. [Medical Office: ([facility])___] : at the medical office located in  [Verbal consent obtained from patient] : the patient, [unfilled] [FreeTextEntry1] : 68 F w/ PMHx of CAD s/p 2 stents, HTN, hyperlipidemia, pre DM, hot flashes, breast calcifications, GERD and cervical cancer being seen through [facility] for URI.  She has not been feeling well for a few days. She is c/o dry cough, Vertigo, congestion, achiness, excessive sweating and headaches. She is taking Robitussin.  Headache goes away with tylenol. Covid test is negative at home but is going for PCR tomorrow at her job.  suffering with the same thing.   Vertigo comes and goes. She believes this is a combo of stress and sinuses. She has been taking bonine. She wants refill of meclizine. Not nauseated.  CAD with 2 stents placed in [place]. She sees Dr. Martinez (cardio) and plans to follow up with him soon. She is on the standard of care medications including a beta blocker, ACE inhibitor, aspirin, Plavix and statin. HTN and GERD controlled on meds.  The patient has a history of cervical cancer and follows up with her gynecologist, Dr. Ho.  The patient did have a LEEP and conization procedure.  She did not need any chemotherapy or radiation.  She  gets her Paps annually. She has pre DM and could not tolerate metformin. Watching her diet. She denies any bleeding despite being on blood thinners such as aspirin, Plavix and fish oil. The patient denies any current blurry vision, chest pain, palpitations, shortness of breath, dyspnea on exertion, abdominal pain, urinary symptoms or any nausea/vomiting/diarrhea/constipation.

## 2024-01-04 NOTE — ASSESSMENT
[FreeTextEntry1] : 68 F w/ PMHx of CAD s/p 2 stents, HTN, hyperlipidemia, pre DM, hot flashes, breast calcifications, GERD and cervical cancer being seen through [facility] for URI.  #1 Acute sinusitis with fatigue, achiness, dry cough, postnasal drip and sinus pressure: Pt told to rest, take tylenol for any fevers or bodyaches, increase their fluid intake, hot showers and vicks vaporub to decongest themselves, mucinex to thin the secretions, warm water gargles, tea with honey and lemon, lozenges and nasal spray. The nasal spray should be used 2 sprays in each nostril daily. Given [id number] mg BID for 10 days and medrol dose pack to take as directed with food. Get official PCR covid testing but this could be flu or RSV as well.  #2 Vertigo: Has meclizine 12.5 mg TID PRN. Avoid quick head mvts.  #3 Hypertension: Controlled and asymptomatic on meds. Risks of uncontrolled hypertension explained and understood. UTD with EKG done with cardio. #4 Hyperlipidemia: the patient is taking simvastatin 40 mg and Zetia. The patient is not having any myalgias on this medication. Has script for fasting lipids and LFTs at the lab. UTD with EKG. #5 CAD s/p stenting and abnormal EKG: the patient is on the standard of care medications including beta blocker, aspirin, Plavix, ACE inhibitor and statin. The patient is on multiple blood thinners including aspirin, Plavix, and fish oil. The patient was told to be vigilant for signs of any bleeding including hemoptysis, hematemesis, melena, bright red blood per rectum. She was told that if any of these develop, that she should followup in the office or to the ER immediately. UTD Juan polanco. #6 Hx of cervical cancer: the patient has a history of cervical cancer, but did not need any hysterectomy or chemotherapy or radiation. The patient had a LEEP procedure and a conization. Follows up with GYN, Dr Ho. #7 preDM: A1C was 6.0 and recheck at the lab--has script. Complications of DM explained and understood. #8 Post menopausal: UTD DEXA. UTD GYN Vic. Weight bearing exercises, continue calcium and vitamin D supplements. #9 Osteopenia: UTD dexa. Fall precautions. Weight bearing exercises.  #10 GERD: Continue Pepcid BID. GERD diet and precautions. UTD with ENT. #11 HCM: UTD with CPE. UTD with EKG. All the patient's questions and concerns were answered at the time of the visit. The patient is agreeable to above treatment plan. HCP is partner Brad Cartagena and she is full code.

## 2024-01-04 NOTE — HEALTH RISK ASSESSMENT
[No falls in past year] : Patient reported no falls in the past year [0] : 2) Feeling down, depressed, or hopeless: Not at all (0) [PHQ-2 Negative - No further assessment needed] : PHQ-2 Negative - No further assessment needed [Never] : Never [Good] : ~his/her~  mood as  good [Patient reported mammogram was normal] : Patient reported mammogram was normal [Patient reported PAP Smear was normal] : Patient reported PAP Smear was normal [Patient reported colonoscopy was abnormal] : Patient reported colonoscopy was abnormal [HIV test declined] : HIV test declined [Hepatitis C test declined] : Hepatitis C test declined [Fully functional (bathing, dressing, toileting, transferring, walking, feeding)] : Fully functional (bathing, dressing, toileting, transferring, walking, feeding) [Fully functional (using the telephone, shopping, preparing meals, housekeeping, doing laundry, using] : Fully functional and needs no help or supervision to perform IADLs (using the telephone, shopping, preparing meals, housekeeping, doing laundry, using transportation, managing medications and managing finances) [Designated Healthcare Proxy] : Designated healthcare proxy [Name: ___] : Health Care Proxy's Name: [unfilled]  [Relationship: ___] : Relationship: [unfilled] [Aggressive treatment] : aggressive treatment [I will adhere to the patient's wishes.] : I will adhere to the patient's wishes. [Time Spent: ___ minutes] : Time Spent: [unfilled] minutes [[id number]] : 0 [MammogramDate] : 8/2021 [PapSmearDate] : 2021 [BoneDensityDate] : 8/12/2020 [ColonoscopyDate] : 2021 [ColonoscopyComments] : polyp-Dr Wheatley [AdvancecareDate] : 7/21/22

## 2024-01-04 NOTE — PHYSICAL EXAM
[General Appearance - Alert] : alert [Sclera] : the sclera and conjunctiva were normal [Extraocular Movements] : extraocular movements were intact [Outer Ear] : the ears and nose were normal in appearance [Hearing Threshold Finger Rub Not Maries] : hearing was normal [Examination Of The Oral Cavity] : the lips and gums were normal [] : no respiratory distress [Oriented To Time, Place, And Person] : oriented to person, place, and time [Impaired Insight] : insight and judgment were intact [Affect] : the affect was normal [Mood] : the mood was normal [Over the Past 2 Weeks, Have You Felt Down, Depressed, or Hopeless?] : 1.) Over the past 2 weeks, have you felt down, depressed, or hopeless? No [Over the Past 2 Weeks, Have You Felt Little Interest or Pleasure Doing Things?] : 2.) Over the past 2 weeks, have you felt little interest or pleasure doing things? No

## 2024-01-04 NOTE — REVIEW OF SYSTEMS
[Feeling Poorly] : feeling poorly [Nasal Discharge] : nasal discharge [Sore Throat] : sore throat [Heartburn] : heartburn [Dizziness] : dizziness [Anxiety] : anxiety [Negative] : Heme/Lymph [Fever] : no fever [Chills] : chills [Feeling Tired] : feeling tired [Eye Pain] : no eye pain [Red Eyes] : eyes not red [Eyesight Problems] : no eyesight problems [Discharge From Eyes] : no purulent discharge from the eyes [Dry Eyes] : no dryness of the eyes [Eyes Itch] : no itching of the eyes [Earache] : no earache [Loss Of Hearing] : no hearing loss [Nosebleeds] : no nosebleeds [Hoarseness] : no hoarseness [Chest Pain] : no chest pain [Palpitations] : no palpitations [Lower Ext Edema] : no lower extremity edema [see HPI] : see HPI [Shortness Of Breath] : no shortness of breath [Wheezing] : no wheezing [Cough] : cough [SOB on Exertion] : no shortness of breath during exertion [Abdominal Pain] : no abdominal pain [Vomiting] : no vomiting [Constipation] : no constipation [Diarrhea] : no diarrhea [Melena] : no melena [Dysuria] : no dysuria [Incontinence] : no incontinence [Pelvic Pain] : no pelvic pain [Dysmenorrhea] : no dysmenorrhea [Arthralgias] : no arthralgias [Joint Pain] : no joint pain [Joint Swelling] : no joint swelling [Joint Stiffness] : no joint stiffness [Limb Pain] : no limb pain [Limb Swelling] : no limb swelling [Skin Lesions] : no skin lesions [Skin Wound] : no skin wound [Itching] : no itching [Change In A Mole] : no change in a mole [Confused] : no confusion [Convulsions] : no convulsions [Fainting] : no fainting [Limb Weakness] : no limb weakness [Difficulty Walking] : no difficulty walking [Suicidal] : not suicidal [Sleep Disturbances] : no sleep disturbances [Depression] : no depression [Change In Personality] : no personality change [Emotional Problems] : no emotional problems [Proptosis] : no proptosis [Hot Flashes] : no hot flashes [Muscle Weakness] : no muscle weakness [Deepening Of The Voice] : no deepening of the voice [Feelings Of Weakness] : no feelings of weakness [Easy Bleeding] : no tendency for easy bleeding [Easy Bruising] : no tendency for easy bruising [Swollen Glands] : no swollen glands [Swollen Glands In The Neck] : no swollen glands in the neck [FreeTextEntry1] : Hx of diverticulitis, ovarian cysts,  HTN, Vertigo

## 2024-01-04 NOTE — END OF VISIT
[1. Privacy issue, precluded by [facility] or Federal Law] : Reason - Privacy issue, precluded by [facility] or Federal Law [FreeTextEntry3] : All medical record entries made by the scribe were at my, Dr. Misty Rodgers's direction and personally dictated by me on 2/16/2021. I have reviewed the chart and agree that the record accurately reflects my personal performance of the history, physical exam, assessment and plan. I have also personally directed, reviewed, and agreed with the chart.\par

## 2024-01-05 ENCOUNTER — TRANSCRIPTION ENCOUNTER (OUTPATIENT)
Age: 69
End: 2024-01-05

## 2024-01-17 ENCOUNTER — RX RENEWAL (OUTPATIENT)
Age: 69
End: 2024-01-17

## 2024-01-17 RX ORDER — SIMVASTATIN 20 MG/1
20 TABLET, FILM COATED ORAL
Qty: 90 | Refills: 3 | Status: ACTIVE | COMMUNITY
Start: 2022-02-07 | End: 1900-01-01

## 2024-02-11 ENCOUNTER — RX RENEWAL (OUTPATIENT)
Age: 69
End: 2024-02-11

## 2024-02-15 ENCOUNTER — APPOINTMENT (OUTPATIENT)
Dept: ORTHOPEDIC SURGERY | Facility: CLINIC | Age: 69
End: 2024-02-15
Payer: MEDICARE

## 2024-02-15 VITALS — WEIGHT: 150 LBS | HEIGHT: 61 IN | BODY MASS INDEX: 28.32 KG/M2

## 2024-02-15 PROCEDURE — 73010 X-RAY EXAM OF SHOULDER BLADE: CPT | Mod: RT

## 2024-02-15 PROCEDURE — 20611 DRAIN/INJ JOINT/BURSA W/US: CPT | Mod: RT

## 2024-02-15 PROCEDURE — 99214 OFFICE O/P EST MOD 30 MIN: CPT | Mod: 25

## 2024-02-15 PROCEDURE — 73030 X-RAY EXAM OF SHOULDER: CPT | Mod: RT

## 2024-02-15 PROCEDURE — J3490M: CUSTOM

## 2024-02-15 NOTE — HISTORY OF PRESENT ILLNESS
[Right Arm] : right arm [de-identified] : 2/15/24: 67 yo RHD female with right shoulder pain since Jan 2024. She reports increased pain since carrying luggage while travel. She tried diclofenac. She states 2/14/24 she pulled a rolling chair and felt severe pain in her shoulder. She has history of RCT treated with CSI and PT. H/o shoulder dislocation as a child.  PMHx: CAD  [FreeTextEntry1] : shoulder

## 2024-02-15 NOTE — PHYSICAL EXAM
[Right] : right shoulder [5 ___] : forward flexion 5[unfilled]/5 [5___] : internal rotation 5[unfilled]/5 [] : pain with strength testing [FreeTextEntry9] : FE 75A 140P ER 60A

## 2024-02-15 NOTE — ASSESSMENT
[FreeTextEntry1] : R shoulder strain, H/o RCT Activity modification. Ice. Trial of PT. Tylenol prn. R SA injection given. Consider MRI if pain persists. RTO 6 weeks.  Procedure Note: Large Joint Injection was performed because of pain and inflammation, failure of conservative treatment.   Medications: Depo-Medrol: 1 cc, 80 mg. Lidocaine: 2 cc, 1%.  Marcaine: 2 cc, .25%.   Medication was injected in the right subacromial space. Patient has tried OTC's including aspirin, Ibuprofen, Aleve etc or prescription NSAIDs, and/or exercises at home and/ or physical therapy without satisfactory response. The risks, benefits, and alternatives to cortisone injection were explained in full to the patient. Risks outlined include but are not limited to infection, sepsis, bleeding, scarring, skin discoloration, temporary increase in pain, syncopal episode, failure to resolve symptoms, allergic reaction, symptom recurrence, and elevation of blood sugar in diabetics. Patient understood the risks. All questions were answered. After discussion of options, patient requested an injection. Oral informed consent was obtained and sterile prep of the injection site was performed using alcohol. Sterile technique was utilized for the procedure including the preparation of the solutions used for the injection. Ethyl chloride spray was used topically.  Sterile technique used. Patient tolerated procedure well. Post Procedure Instructions: Patient was advised to call if redness, pain, or fever occur and apply ice for 15 min. out of every hour for the next 12-24 hours as tolerated. patient was advised to rest the joint(s) for 2 days.  Ultrasound Guidance was used for the following reasons: for prior failure or difficult injection and to visualize tearing and inflammation. Ultrasound guided injection was performed of the shoulder, visualization of the needle and placement of injection was performed without complication.

## 2024-03-03 ENCOUNTER — RX RENEWAL (OUTPATIENT)
Age: 69
End: 2024-03-03

## 2024-03-14 ENCOUNTER — TRANSCRIPTION ENCOUNTER (OUTPATIENT)
Age: 69
End: 2024-03-14

## 2024-03-21 ENCOUNTER — APPOINTMENT (OUTPATIENT)
Dept: ORTHOPEDIC SURGERY | Facility: CLINIC | Age: 69
End: 2024-03-21
Payer: MEDICARE

## 2024-03-21 VITALS — HEIGHT: 61 IN | BODY MASS INDEX: 28.32 KG/M2 | WEIGHT: 150 LBS

## 2024-03-21 DIAGNOSIS — M75.121 COMPLETE ROTATOR CUFF TEAR OR RUPTURE OF RIGHT SHOULDER, NOT SPECIFIED AS TRAUMATIC: ICD-10-CM

## 2024-03-21 PROCEDURE — 99213 OFFICE O/P EST LOW 20 MIN: CPT

## 2024-03-21 NOTE — ASSESSMENT
[FreeTextEntry1] : R shoulder strain, H/o RCT Activity modification. Ice. She is in PT, will continue. Tylenol prn. R SA injection 2/15/24, good relief. She will need updated MRI if symptoms worsen. RTO 6-8 weeks.

## 2024-03-21 NOTE — PHYSICAL EXAM
[Right] : right shoulder [5 ___] : forward flexion 5[unfilled]/5 [5___] : external rotation 5[unfilled]/5 [] : discomfort with strength testing [FreeTextEntry9] : FE 120A 140P ER 60A

## 2024-03-21 NOTE — HISTORY OF PRESENT ILLNESS
[de-identified] : 3/21/24: Here for follow up. She is in PT which helps but she does have soreness after exercises. There is pain reaching overhead and getting dressed.  2/15/24: 69 yo RHD female with right shoulder pain since Jan 2024. She reports increased pain since carrying luggage while travel. She tried diclofenac. She states 2/14/24 she pulled a rolling chair and felt severe pain in her shoulder. She has history of RCT treated with CSI and PT. H/o shoulder dislocation as a child.  PMHx: CAD

## 2024-03-22 NOTE — DIETITIAN INITIAL EVALUATION ADULT - ORAL INTAKE PTA/DIET HISTORY
"Chief Complaint  ADHD (1 month f/u Medications/ADHD ,1 month f/u-Mary Rose(mom) called and gave verbal permission to treat and to increase the dosage on medication/) and MEDICATION DOSEAGE     Subjective        Adamaris Mcleod presents to [facility]  History of Present Illness  Adamaris is a 17-year-old female presenting today for 1 month follow-up on her ADHD. Her mother called and gave a verbal permission to see patient without her presence.  We started her on Concerta 18 mg daily.  She says it has improved her symptoms.  It is easier for her to complete her schoolwork and to pay attention in class.  She is able to keep track of things better.  She did have a decrease in appetite the first week, but that is improved.  She denies any insomnia.  She does feel like she is lagging occasionally still and is requesting a dose increase.    She also needs a refill on her Wellbutrin.      The following portions of the patient's history were reviewed and updated as appropriate: allergies, current medications, past family history, past medical history, past social history, past surgical history and problem list.    No Known Allergies    Patient Active Problem List   Diagnosis    Encounter to establish care    Depression    Attention deficit hyperactivity disorder (ADHD), combined type       Current Outpatient Medications   Medication Instructions    buPROPion XL (WELLBUTRIN XL) 150 mg, Oral, Daily    methylphenidate (CONCERTA) 36 mg, Oral, Every Morning          Objective   Vital Signs:  /71 (BP Location: Left arm, Patient Position: Sitting, Cuff Size: Adult)   Pulse 82   Temp 97.5 °F (36.4 °C) (Temporal)   Ht 154.9 cm (61\")   Wt 55.5 kg (122 lb 6.4 oz)   SpO2 100%   BMI 23.13 kg/m²   Estimated body mass index is 23.13 kg/m² as calculated from the following:    Height as of this encounter: 154.9 cm (61\").    Weight as of this encounter: 55.5 kg (122 lb 6.4 oz).  71 %ile (Z= " 0.55) based on CDC (Girls, 2-20 Years) BMI-for-age based on BMI available as of 3/22/2024.    Pediatric BMI = 71 %ile (Z= 0.55) based on CDC (Girls, 2-20 Years) BMI-for-age based on BMI available as of 3/22/2024.. BMI is within normal parameters. No other follow-up for BMI required.      Review of Systems   Constitutional:  Negative for activity change, appetite change, fatigue and unexpected weight change.   Respiratory:  Negative for cough, chest tightness and shortness of breath.    Cardiovascular:  Negative for chest pain and palpitations.   Gastrointestinal:  Negative for constipation, diarrhea, nausea and vomiting.   Musculoskeletal:  Negative for myalgias.   Neurological:  Negative for weakness and headaches.   Psychiatric/Behavioral:  Positive for decreased concentration. Negative for agitation, self-injury, sleep disturbance and suicidal ideas. The patient is not nervous/anxious.         Physical Exam  Constitutional:       Appearance: Normal appearance.   Cardiovascular:      Rate and Rhythm: Normal rate and regular rhythm.   Pulmonary:      Effort: Pulmonary effort is normal.      Breath sounds: Normal breath sounds.   Neurological:      Mental Status: She is alert and oriented to person, place, and time.   Psychiatric:         Mood and Affect: Mood normal.         Behavior: Behavior normal.         Thought Content: Thought content normal.         Judgment: Judgment normal.        Result Review :                   Assessment and Plan   Diagnoses and all orders for this visit:    1. Attention deficit hyperactivity disorder (ADHD), combined type (Primary)  Comments:  Increase Concerta to 36mg daily.  Follow up in 4 months.  Orders:  -     methylphenidate (Concerta) 36 MG CR tablet; Take 1 tablet by mouth Every Morning  Dispense: 30 tablet; Refill: 0    2. Depression, unspecified depression type  Comments:  refill of wellbutrin sent to pharmacy  Orders:  -     buPROPion XL (Wellbutrin XL) 150 MG 24 hr  tablet; Take 1 tablet by mouth Daily.  Dispense: 90 tablet; Refill: 3             Follow Up   Return in about 4 months (around 7/22/2024).  Patient was given instructions and counseling regarding her condition or for health maintenance advice. Please see specific information pulled into the AVS if appropriate.        patient seen with family in room. reports with good PO PTA although following low fiber diet with recent discharge in March with diverticulitis diagnosis.   previous history diverticulitis patient reports for OR tomorrow bowel resection   low Na heart healthy diet followed at home   no food allergies

## 2024-04-19 ENCOUNTER — APPOINTMENT (OUTPATIENT)
Dept: ORTHOPEDIC SURGERY | Facility: CLINIC | Age: 69
End: 2024-04-19
Payer: MEDICARE

## 2024-04-19 VITALS — HEIGHT: 61 IN | WEIGHT: 150 LBS | BODY MASS INDEX: 28.32 KG/M2

## 2024-04-19 DIAGNOSIS — G56.01 CARPAL TUNNEL SYNDROME, RIGHT UPPER LIMB: ICD-10-CM

## 2024-04-19 PROCEDURE — 99213 OFFICE O/P EST LOW 20 MIN: CPT | Mod: 25

## 2024-04-19 PROCEDURE — 20550 NJX 1 TENDON SHEATH/LIGAMENT: CPT | Mod: RT

## 2024-04-21 ENCOUNTER — RX RENEWAL (OUTPATIENT)
Age: 69
End: 2024-04-21

## 2024-04-25 ENCOUNTER — RX RENEWAL (OUTPATIENT)
Age: 69
End: 2024-04-25

## 2024-05-06 ENCOUNTER — APPOINTMENT (OUTPATIENT)
Dept: INTERNAL MEDICINE | Facility: CLINIC | Age: 69
End: 2024-05-06
Payer: MEDICARE

## 2024-05-06 DIAGNOSIS — I10 ESSENTIAL (PRIMARY) HYPERTENSION: ICD-10-CM

## 2024-05-06 DIAGNOSIS — U07.1 COVID-19: ICD-10-CM

## 2024-05-06 DIAGNOSIS — R42 DIZZINESS AND GIDDINESS: ICD-10-CM

## 2024-05-06 DIAGNOSIS — J02.9 ACUTE PHARYNGITIS, UNSPECIFIED: ICD-10-CM

## 2024-05-06 DIAGNOSIS — R06.7 SNEEZING: ICD-10-CM

## 2024-05-06 DIAGNOSIS — J30.2 OTHER SEASONAL ALLERGIC RHINITIS: ICD-10-CM

## 2024-05-06 DIAGNOSIS — I25.10 ATHEROSCLEROTIC HEART DISEASE OF NATIVE CORONARY ARTERY W/OUT ANGINA PECTORIS: ICD-10-CM

## 2024-05-06 DIAGNOSIS — H93.8X3 OTHER SPECIFIED DISORDERS OF EAR, BILATERAL: ICD-10-CM

## 2024-05-06 DIAGNOSIS — E78.5 HYPERLIPIDEMIA, UNSPECIFIED: ICD-10-CM

## 2024-05-06 DIAGNOSIS — R09.82 POSTNASAL DRIP: ICD-10-CM

## 2024-05-06 DIAGNOSIS — J45.909 UNSPECIFIED ASTHMA, UNCOMPLICATED: ICD-10-CM

## 2024-05-06 DIAGNOSIS — K21.9 GASTRO-ESOPHAGEAL REFLUX DISEASE W/OUT ESOPHAGITIS: ICD-10-CM

## 2024-05-06 DIAGNOSIS — R73.03 PREDIABETES.: ICD-10-CM

## 2024-05-06 DIAGNOSIS — R05.8 OTHER SPECIFIED COUGH: ICD-10-CM

## 2024-05-06 PROCEDURE — G2211 COMPLEX E/M VISIT ADD ON: CPT

## 2024-05-06 PROCEDURE — 99214 OFFICE O/P EST MOD 30 MIN: CPT

## 2024-05-06 RX ORDER — MOLNUPIRAVIR 200 MG/1
200 CAPSULE ORAL TWICE DAILY
Qty: 40 | Refills: 0 | Status: ACTIVE | COMMUNITY
Start: 2024-05-06 | End: 1900-01-01

## 2024-05-17 ENCOUNTER — APPOINTMENT (OUTPATIENT)
Dept: ORTHOPEDIC SURGERY | Facility: CLINIC | Age: 69
End: 2024-05-17

## 2024-06-14 ENCOUNTER — RX RENEWAL (OUTPATIENT)
Age: 69
End: 2024-06-14

## 2024-06-14 RX ORDER — FAMOTIDINE 20 MG/1
20 TABLET, FILM COATED ORAL
Qty: 180 | Refills: 0 | Status: ACTIVE | COMMUNITY
Start: 2020-05-11 | End: 1900-01-01

## 2024-06-14 NOTE — ASSESSMENT
[FreeTextEntry1] : IMPRESSION: #  Due for a surveillance colonoscopy - history of tubular adenoma.  - Colonoscopy by Dr. Wheatley on 11/2021: Diminutive descending colon polyp removed (tubular adenoma). Mild to moderate sigmoid diverticulosis. Good prep. Repeat colonoscopy in 3 years. - Colonoscopy in in 4/2018 - two polyps were removed - largest polyp was 1 cm - hyperplastic polyps. Repeat colonoscopy in 4/2021 recommended.  # Family history of digestive cancers -  Father had colon cancer in late 70s.  -  Paternal aunt had colon cancer late 60s. -  Paternal aunt had esophageal cancer -  Advise previously to consider genetic testing  # History of complicated sigmoid diverticulitis - s/p sigmoidectomy with BSO on 4/8/2023 - CT scan on 4/4/2023 - worsening sigmoid diverticulitis with developing abscess and possible micro-perforation, ovarian cysts. - CT scan A/P with IV contrast 3/18/2023: Sigmoid diverticulosis. Mild thickening of sigmoid colon compatible with acute uncomplicated diverticulitis. 4 mm RML nodule. B/l ovarian cysts - right slightly has decreased. - CT scan fo the A/P with IV contrast on 8/2021: Colon diverticulosis without diverticulitis. Fatty liver. b/l ovarian cyst. - CT scan of the abd/pelvis 6/17/2021: Sigmoid diverticulitis - b/l ovarian cysts slightly more lager than prior study - consider MRI of pelvis as per radiologist. She has follow up GYN - History of abdominal bloating/distention/difficulty moving her bowels - History of diverticulitis in Feb 2018 - treated with oral abx   # History of reflux symptoms - previously on H2 blockers - EGD by Dr. Wheatley on 11/2021; Nml esophagus s/p distal esophagus bx (neg path). Mild antral erythema sp A/B bx (neg for HP and IM). Nml duodenum. - EGD in 2014 - mild gastritis and mild duodenitis.   # CAD s/p PCI to the proximal LAD with a Taxus stent in 2007, PCI to the RI in March of 2012 in the setting of a mild NSTEMI, on aspirin/Plavix, hyperlipidemia, and hypertension       DISCUSSION/PLAN Colonoscopy in 11/2024. Risks for diverticulitis reviewed including lack of physical activity, low fiber diet, high fat/meat diet, weight gain etc. Advised high fiber diet, and increased physical activity.

## 2024-06-14 NOTE — HISTORY OF PRESENT ILLNESS
[FreeTextEntry1] : 67 y/o mother of three, with Hx of CAD s/p PCI to the proximal LAD with a Taxus stent in 2007, PCI to the RI in March of 2012 in the setting of a mild NSTEMI, on aspirin/Plavix, hyperlipidemia, hypertension, diverticulitis s/p sigmoid resection on 4/8/2023 who presents for follow up.    Initial visit 4/2021: She says she had only one attack of diverticulitis. She recalls on Feb, 2018, while at work she started having lower abdominal pain that progressively worsened, she went to ER and diagnosed with diverticulitis - she was discharged from ER to complete Cipro and Flagyl. She says she had a "terrible" reaction to Flagyl - she felt weak, faint, couldn't walk. She went back to ER because she was feeling weak - was eventually told that perhaps she is allergic to Flagyl.  Since then gets "twinges" in lower abdomen every now and then. She had a colonoscopy in 4/2018, and was found to have a 1 cm polyp in the rectum that was removed, 8 mm descending colon polyp removed. There was sigmoid diverticulosis. Both polyps were hyperplastic. She was advised to have a repeat colonoscopy in 3 years.  She says she was tested for celiac disease in the past and it was negative. Last couple of months, she has been feeling bloated. Abdomen distends and can get hard - she takes chamomile tea which helps with bloating, with the distension and hardness.  Gets nausea when feeling bloated but does not vomit. She takes beano and prebiotic and probiotics - she is not sure if it has been helping with her bloating. Recently she has been having difficulty moving her bowels, abdominal cramps - she took a laxative and then felt faint.  She has a bowel movement once a day - when she has vegetables shakes she has better bowel movements.  No melena and no hematochezia. No loss of appetite, no abnormal weight loss. Takes Pepcid twice a day for years - originally on Zantac - she was started on acid reduces 7 years because at that time she experiencing heartburn. She has not taken herself off of H2 blockers. She says rarely gets heartburn. 2 months ago she saw her cardiologist and underwent stress test and echo.  On Oct 2014 she had an upper endoscopy and found to have mild gastritis, and mild duodenitis. Sister has had recurrent diverticulitis. Father did not have colon cancer - only prostate cancer. Paternal aunt had esophageal cancer.  Discussion/Summary April 2021: Spoke with patient advised about holding Plavix 5 days prior - advised clearance from cardiologist. She says she spoke with her sister who told her father had cancerous colon polyp in his late 70s. Paternal aunt also had colon cancer in her late 60s. Another paternal aunt had esophageal cancer. Advised considering genetic testing and seeing counselor - contact information given.   Discussion/Summary June 17, 2021: lower abdominal pain =- worried about diverticulitis - took abx - advised to go to ER and cancel upcoming colonoscopy.  CT scan of the abd/pelvis 6/17/2021: Sigmoid diverticulitis. B/l ovarian cysts slightly more lager than prior study - consider MRI of pelvis as per radiologist.  Office visit 6/2021: She says she went to ER last Thursday for lower abdominal pain- she was discharged from ER with Cipro/Cleocin for sigmoid diverticulitis. She says pain resolved by Saturday. No fevers, no chills. No loss of appetite, no weight loss. No melena and no hematochezia.    Discussion/Summary 8/2021: On Saturday she had lower abdominal discomfort - she started taking Cipro/Flagyl, she is concerned about diverticulitis - she feeling better with pain, but says she is a "little tender to touch"- feels a little nausea. No fevers, no chills. Advised ER. Will order CT scan of abd/pelvis to r/o Diverticulitis.    Discussion/Summary 8/2021: Called patient and results of recent CT scan reviewed - no evidence of diverticulitis - she says her initial pain lasted for a day that was documented on prior charting - advised if ongoing pain then to call me - risks of a colonoscopy such as bowel perforation requiring surgery, bleeding, risks of anesthesia etc were reviewed - she says she has not made surgical consultation for elective hemicolectomy - but will in the future.      Office visit 3/2023: She recalls eating popcorn and pistachios and then the next day had lower abdominal discomfort by the evening went to [place] ER - admitted for sigmoid diverticulitis. She had been on clear liquid diet for two days and then advanced her diet - tolerated her diet. she was discharged on oral antibiotics, her pain resolved on while on hospital. No fevers. Has been having BMS no melena and no hematochezia. She had been nauseas which resolved. No vomiting.  Drinks on the weekends - no more than glasses.     Office visit 10/2023:   She says she had another attack of diverticulitis in 4/2023 - ER visit in [facility] - CT scan on 4/4/2023 - worsening sigmoid diverticulitis with developing abscess and possible micro perforation, ovarian cysts.  On April 8/2023: Partial colectomy with low anterior anastomosis, b/l salpingo-oophorectomy.  Takes MiraLAX once a day and says has been regular - joined a gym. Patient denies having abdominal pain, diarrhea, loss of appetite, weight loss, melena and hematochezia.

## 2024-06-20 ENCOUNTER — APPOINTMENT (OUTPATIENT)
Dept: GASTROENTEROLOGY | Facility: CLINIC | Age: 69
End: 2024-06-20

## 2024-06-25 ENCOUNTER — APPOINTMENT (OUTPATIENT)
Dept: GASTROENTEROLOGY | Facility: CLINIC | Age: 69
End: 2024-06-25
Payer: MEDICARE

## 2024-06-25 VITALS
SYSTOLIC BLOOD PRESSURE: 166 MMHG | HEART RATE: 66 BPM | HEIGHT: 61 IN | DIASTOLIC BLOOD PRESSURE: 82 MMHG | BODY MASS INDEX: 31.15 KG/M2 | OXYGEN SATURATION: 97 % | WEIGHT: 165 LBS

## 2024-06-25 DIAGNOSIS — R10.32 LEFT LOWER QUADRANT PAIN: ICD-10-CM

## 2024-06-25 DIAGNOSIS — K59.09 OTHER CONSTIPATION: ICD-10-CM

## 2024-06-25 DIAGNOSIS — D12.6 BENIGN NEOPLASM OF COLON, UNSPECIFIED: ICD-10-CM

## 2024-06-25 PROCEDURE — 99215 OFFICE O/P EST HI 40 MIN: CPT

## 2024-06-26 DIAGNOSIS — K63.5 POLYP OF COLON: ICD-10-CM

## 2024-07-08 ENCOUNTER — RX RENEWAL (OUTPATIENT)
Age: 69
End: 2024-07-08

## 2024-07-12 ENCOUNTER — APPOINTMENT (OUTPATIENT)
Dept: CT IMAGING | Facility: CLINIC | Age: 69
End: 2024-07-12
Payer: MEDICARE

## 2024-07-12 ENCOUNTER — OUTPATIENT (OUTPATIENT)
Dept: OUTPATIENT SERVICES | Facility: HOSPITAL | Age: 69
LOS: 1 days | End: 2024-07-12
Payer: MEDICARE

## 2024-07-12 DIAGNOSIS — K63.5 POLYP OF COLON: ICD-10-CM

## 2024-07-12 DIAGNOSIS — Z00.8 ENCOUNTER FOR OTHER GENERAL EXAMINATION: ICD-10-CM

## 2024-07-12 DIAGNOSIS — C53.9 MALIGNANT NEOPLASM OF CERVIX UTERI, UNSPECIFIED: Chronic | ICD-10-CM

## 2024-07-12 PROCEDURE — 74261 CT COLONOGRAPHY DX: CPT

## 2024-07-12 PROCEDURE — 74261 CT COLONOGRAPHY DX: CPT | Mod: 26

## 2024-07-29 ENCOUNTER — APPOINTMENT (OUTPATIENT)
Dept: CARDIOLOGY | Facility: CLINIC | Age: 69
End: 2024-07-29
Payer: MEDICARE

## 2024-07-29 ENCOUNTER — NON-APPOINTMENT (OUTPATIENT)
Age: 69
End: 2024-07-29

## 2024-07-29 VITALS
BODY MASS INDEX: 29.83 KG/M2 | SYSTOLIC BLOOD PRESSURE: 187 MMHG | DIASTOLIC BLOOD PRESSURE: 82 MMHG | HEART RATE: 67 BPM | OXYGEN SATURATION: 98 % | HEIGHT: 61 IN | WEIGHT: 158 LBS

## 2024-07-29 VITALS — DIASTOLIC BLOOD PRESSURE: 90 MMHG | SYSTOLIC BLOOD PRESSURE: 160 MMHG

## 2024-07-29 DIAGNOSIS — I10 ESSENTIAL (PRIMARY) HYPERTENSION: ICD-10-CM

## 2024-07-29 DIAGNOSIS — I25.10 ATHEROSCLEROTIC HEART DISEASE OF NATIVE CORONARY ARTERY W/OUT ANGINA PECTORIS: ICD-10-CM

## 2024-07-29 DIAGNOSIS — E78.5 HYPERLIPIDEMIA, UNSPECIFIED: ICD-10-CM

## 2024-07-29 PROCEDURE — 93000 ELECTROCARDIOGRAM COMPLETE: CPT

## 2024-07-29 PROCEDURE — G2211 COMPLEX E/M VISIT ADD ON: CPT

## 2024-07-29 PROCEDURE — 99214 OFFICE O/P EST MOD 30 MIN: CPT

## 2024-07-29 NOTE — DISCUSSION/SUMMARY
[FreeTextEntry1] : This is a 69 year old woman with a history of CAD s/p PCI to the proximal LAD with a Taxus stent in 2007, PCI to the RI in March of 2012 in the setting of a mild NSTEMI, hyperlipidemia, and hypertension presents to the office for a follow up visit.   She is struggling with vertigo, and her BP is uncontrolled.  It has been 3-4 years since her last cardiac evaluation. I am referring her for an echocardiogram to assess Her cardiac structure and function, as well as an exercise nuclear stress test to assess Her hemodynamic response to exercise, and to assess for the presence of obstructive coronary artery disease.   She will continue DAPT with aspirin and Plavix.  Her pressure is high.  She is going to increase losartan to 100 QD.  She will continue Toprol 50 QD.   She needs to get her weight back down, and start exercising, which she knows.   She is tolerating simvastatin, and her most recent LDL was at goal.  She will continue her current dose of Zetia.  She is to continue dual antiplatelet therapy.  We discussed the importance of a healthy diet, regular exercise, and weight loss.  She is seeing her PMD in August and will have a full set of blood work.  [EKG obtained to assist in diagnosis and management of assessed problem(s)] : EKG obtained to assist in diagnosis and management of assessed problem(s)

## 2024-07-29 NOTE — REASON FOR VISIT
Patient Information     Patient Name MRKaren Ramirez [id number] Female 1946      Telephone Encounter by Lindsey Elizalde at 2017  2:47 PM     Author:  Lindsey Elizalde Service:  (none) Author Type:  (none)     Filed:  2017  2:50 PM Encounter Date:  2017 Status:  Signed     :  Lindsey Elizalde            MAF-Pt has appt  but only has one insulin dose left. Dr. Nanette Rodriguez at [facility] prescribed it while she was in the hospital and told her that her PCP would need to order it from now on.     Lindsey Elizalde ....................  2017   2:50 PM             [Follow-Up - Clinic] : a clinic follow-up of [Chest Pain] : chest pain [Coronary Artery Disease] : coronary artery disease [Hyperlipidemia] : hyperlipidemia [Hypertension] : hypertension [Medication Management] : Medication management

## 2024-07-29 NOTE — CARDIOLOGY SUMMARY
[No Ischemia] : no Ischemia [___] : [unfilled] [LVEF ___%] : LVEF [unfilled]% [None] : normal LV function [Normal] : normal LA size [Mild] : mild mitral regurgitation [de-identified] : NSR with no acute changes. [de-identified] : 2/2021:  No evidence of ischemia.  [de-identified] : 2/2021:  Normal LV function

## 2024-07-29 NOTE — HISTORY OF PRESENT ILLNESS
[FreeTextEntry1] : This is a 69 year old woman with a history of CAD s/p PCI to the proximal LAD with a Taxus stent in 2007, PCI to the RI in March of 2012 in the setting of a mild NSTEMI, hyperlipidemia, and hypertension presents to the office for a follow up visit.   Form a cardiac perspective, she has been feeling well since her last visit.  She had LE edema on amlodipine, and has stopped it.  Her BP is high now even when checked at home.  She has been struggling with episodic vertigo.  At times, she can not get out of bed.    She is denying chest pain or exertional dyspnea.    She denies dyspnea, PND, orthopnea, LE swelling, or syncope.  She has no further myalgia on her current dose of simvastatin.  Her LDL is controlled.   She had reflux type chest pain that resolved when she eliminated red onions from her salad.

## 2024-07-29 NOTE — PHYSICAL EXAM
[Normal Appearance] : normal appearance [Well Groomed] : well groomed [General Appearance - In No Acute Distress] : no acute distress [Normal Conjunctiva] : the conjunctiva exhibited no abnormalities [Eyelids - No Xanthelasma] : the eyelids demonstrated no xanthelasmas [Normal Oral Mucosa] : normal oral mucosa [No Oral Pallor] : no oral pallor [Normal Oropharynx] : normal oropharynx [Normal Jugular Venous V Waves Present] : normal jugular venous V waves present [Respiration, Rhythm And Depth] : normal respiratory rhythm and effort [Exaggerated Use Of Accessory Muscles For Inspiration] : no accessory muscle use [Auscultation Breath Sounds / Voice Sounds] : lungs were clear to auscultation bilaterally [Bowel Sounds] : normal bowel sounds [Abdomen Soft] : soft [Abdomen Tenderness] : non-tender [Abnormal Walk] : normal gait [Nail Clubbing] : no clubbing of the fingernails [Cyanosis, Localized] : no localized cyanosis [Petechial Hemorrhages (___cm)] : no petechial hemorrhages [Skin Color & Pigmentation] : normal skin color and pigmentation [] : no rash [Oriented To Time, Place, And Person] : oriented to person, place, and time [Affect] : the affect was normal [Mood] : the mood was normal [No Anxiety] : not feeling anxious [5th Left ICS - MCL] : palpated at the 5th LICS in the midclavicular line [Normal] : normal [No Precordial Heave] : no precordial heave was noted [Normal Rate] : normal [Rhythm Regular] : regular [Normal S1] : normal S1 [Normal S2] : normal S2 [No Murmur] : no murmurs heard [2+] : left 2+ [No Abnormalities] : the abdominal aorta was not enlarged and no bruit was heard [No Pitting Edema] : no pitting edema present [FreeTextEntry1] : No JVD. [S3] : no S3 [Right Carotid Bruit] : no bruit heard over the right carotid [Left Carotid Bruit] : no bruit heard over the left carotid [Bruit] : no bruit heard

## 2024-07-30 ENCOUNTER — TRANSCRIPTION ENCOUNTER (OUTPATIENT)
Age: 69
End: 2024-07-30

## 2024-07-31 ENCOUNTER — TRANSCRIPTION ENCOUNTER (OUTPATIENT)
Age: 69
End: 2024-07-31

## 2024-08-02 ENCOUNTER — APPOINTMENT (OUTPATIENT)
Dept: ORTHOPEDIC SURGERY | Facility: CLINIC | Age: 69
End: 2024-08-02

## 2024-08-02 DIAGNOSIS — M65.311 TRIGGER THUMB, RIGHT THUMB: ICD-10-CM

## 2024-08-02 PROCEDURE — 20550 NJX 1 TENDON SHEATH/LIGAMENT: CPT | Mod: RT

## 2024-08-02 NOTE — DISCUSSION/SUMMARY
[de-identified] : Discussed the nature of the diagnosis and risk and benefits of different modalities of treatment. Rt trigger finger thumb  Discussed conservative management as symptoms demand vs CSI. Pt would like a CSI in the RT thumb  Done today and tolerated well. All questions answered. PRN

## 2024-08-02 NOTE — HISTORY OF PRESENT ILLNESS
[9] : 9 [8] : 8 [Part time] : Work status: part time [de-identified] : 69 year old female presenting with RT thumb pain and locking. Previously treated for Left trigger thumb. Also, with numbness in the right thumb. Worse at night.  [] : no [FreeTextEntry1] : left thumb [FreeTextEntry5] : 67yo RHD female presenting with left thumb pain. Reports past CSI in February/March 2023 from previous orthopedic. States clicking and sharp pain. Has applied Voltaren with relief.  [de-identified] : OBGYN office

## 2024-08-02 NOTE — PROCEDURE
[FreeTextEntry3] : Tendon sheath was performed of the right thumb trigger finger. The indication for this procedure was pain and inflammation. The site was prepped with alcohol, ethyl chloride sprayed topically, and sterile technique used. An injection of Lidocaine 0.5cc of 1%, Triamcinolone (Kenalog) 0.5cc of 10 mg was used. Patient tolerated procedure well.   The risks, benefits, and alternatives have been discussed, and verbal consent was obtained.

## 2024-08-12 ENCOUNTER — APPOINTMENT (OUTPATIENT)
Dept: CARDIOLOGY | Facility: CLINIC | Age: 69
End: 2024-08-12
Payer: MEDICARE

## 2024-08-12 PROCEDURE — 93015 CV STRESS TEST SUPVJ I&R: CPT

## 2024-08-12 PROCEDURE — 93306 TTE W/DOPPLER COMPLETE: CPT

## 2024-08-12 PROCEDURE — 78452 HT MUSCLE IMAGE SPECT MULT: CPT

## 2024-08-12 PROCEDURE — A9500: CPT

## 2024-08-25 ENCOUNTER — NON-APPOINTMENT (OUTPATIENT)
Age: 69
End: 2024-08-25

## 2024-08-25 LAB
25(OH)D3 SERPL-MCNC: 38.8 NG/ML
ALBUMIN SERPL ELPH-MCNC: 4.6 G/DL
ALP BLD-CCNC: 92 U/L
ALT SERPL-CCNC: 25 U/L
ANION GAP SERPL CALC-SCNC: 13 MMOL/L
APPEARANCE: CLEAR
AST SERPL-CCNC: 22 U/L
BASOPHILS # BLD AUTO: 0.05 K/UL
BASOPHILS NFR BLD AUTO: 0.9 %
BILIRUB SERPL-MCNC: 0.4 MG/DL
BILIRUBIN URINE: NEGATIVE
BLOOD URINE: NEGATIVE
BUN SERPL-MCNC: 16 MG/DL
CALCIUM SERPL-MCNC: 9.7 MG/DL
CHLORIDE SERPL-SCNC: 102 MMOL/L
CHOLEST SERPL-MCNC: 166 MG/DL
CO2 SERPL-SCNC: 24 MMOL/L
COLOR: YELLOW
CREAT SERPL-MCNC: 0.68 MG/DL
CREAT SPEC-SCNC: 30 MG/DL
EGFR: 94 ML/MIN/1.73M2
EOSINOPHIL # BLD AUTO: 0.22 K/UL
EOSINOPHIL NFR BLD AUTO: 3.8 %
ESTIMATED AVERAGE GLUCOSE: 137 MG/DL
GLUCOSE QUALITATIVE U: NEGATIVE MG/DL
GLUCOSE SERPL-MCNC: 115 MG/DL
HBA1C MFR BLD HPLC: 6.4 %
HCT VFR BLD CALC: 42 %
HDLC SERPL-MCNC: 78 MG/DL
HGB BLD-MCNC: 13.9 G/DL
IMM GRANULOCYTES NFR BLD AUTO: 0.2 %
KETONES URINE: NEGATIVE MG/DL
LDLC SERPL CALC-MCNC: 72 MG/DL
LEUKOCYTE ESTERASE URINE: NEGATIVE
LYMPHOCYTES # BLD AUTO: 1.65 K/UL
LYMPHOCYTES NFR BLD AUTO: 28.6 %
MAN DIFF?: NORMAL
MCHC RBC-ENTMCNC: 29.9 PG
MCHC RBC-ENTMCNC: 33.1 GM/DL
MCV RBC AUTO: 90.3 FL
MICROALBUMIN 24H UR DL<=1MG/L-MCNC: <1.2 MG/DL
MICROALBUMIN/CREAT 24H UR-RTO: NORMAL MG/G
MONOCYTES # BLD AUTO: 0.53 K/UL
MONOCYTES NFR BLD AUTO: 9.2 %
NEUTROPHILS # BLD AUTO: 3.3 K/UL
NEUTROPHILS NFR BLD AUTO: 57.3 %
NITRITE URINE: NEGATIVE
NONHDLC SERPL-MCNC: 87 MG/DL
PH URINE: 7.5
PLATELET # BLD AUTO: 283 K/UL
POTASSIUM SERPL-SCNC: 4.2 MMOL/L
PROT SERPL-MCNC: 7 G/DL
PROTEIN URINE: NEGATIVE MG/DL
RBC # BLD: 4.65 M/UL
RBC # FLD: 13.7 %
SODIUM SERPL-SCNC: 139 MMOL/L
SPECIFIC GRAVITY URINE: 1.01
TRIGL SERPL-MCNC: 81 MG/DL
TSH SERPL-ACNC: 0.8 UIU/ML
UROBILINOGEN URINE: 0.2 MG/DL
WBC # FLD AUTO: 5.76 K/UL

## 2024-08-26 ENCOUNTER — APPOINTMENT (OUTPATIENT)
Dept: INTERNAL MEDICINE | Facility: CLINIC | Age: 69
End: 2024-08-26

## 2024-08-26 ENCOUNTER — RX RENEWAL (OUTPATIENT)
Age: 69
End: 2024-08-26

## 2024-08-26 VITALS
WEIGHT: 158 LBS | SYSTOLIC BLOOD PRESSURE: 136 MMHG | DIASTOLIC BLOOD PRESSURE: 82 MMHG | TEMPERATURE: 98 F | BODY MASS INDEX: 29.83 KG/M2 | OXYGEN SATURATION: 98 % | HEART RATE: 80 BPM | HEIGHT: 61 IN

## 2024-08-26 DIAGNOSIS — R73.03 PREDIABETES.: ICD-10-CM

## 2024-08-26 DIAGNOSIS — R06.7 SNEEZING: ICD-10-CM

## 2024-08-26 DIAGNOSIS — R23.2 FLUSHING: ICD-10-CM

## 2024-08-26 DIAGNOSIS — M85.80 OTHER SPECIFIED DISORDERS OF BONE DENSITY AND STRUCTURE, UNSPECIFIED SITE: ICD-10-CM

## 2024-08-26 DIAGNOSIS — R05.8 OTHER SPECIFIED COUGH: ICD-10-CM

## 2024-08-26 DIAGNOSIS — I10 ESSENTIAL (PRIMARY) HYPERTENSION: ICD-10-CM

## 2024-08-26 DIAGNOSIS — J45.909 UNSPECIFIED ASTHMA, UNCOMPLICATED: ICD-10-CM

## 2024-08-26 DIAGNOSIS — I25.10 ATHEROSCLEROTIC HEART DISEASE OF NATIVE CORONARY ARTERY W/OUT ANGINA PECTORIS: ICD-10-CM

## 2024-08-26 DIAGNOSIS — K59.09 OTHER CONSTIPATION: ICD-10-CM

## 2024-08-26 DIAGNOSIS — E78.5 HYPERLIPIDEMIA, UNSPECIFIED: ICD-10-CM

## 2024-08-26 DIAGNOSIS — R91.1 SOLITARY PULMONARY NODULE: ICD-10-CM

## 2024-08-26 DIAGNOSIS — K21.9 GASTRO-ESOPHAGEAL REFLUX DISEASE W/OUT ESOPHAGITIS: ICD-10-CM

## 2024-08-26 DIAGNOSIS — H93.8X3 OTHER SPECIFIED DISORDERS OF EAR, BILATERAL: ICD-10-CM

## 2024-08-26 DIAGNOSIS — Z00.00 ENCOUNTER FOR GENERAL ADULT MEDICAL EXAMINATION W/OUT ABNORMAL FINDINGS: ICD-10-CM

## 2024-08-26 DIAGNOSIS — U07.1 COVID-19: ICD-10-CM

## 2024-08-26 DIAGNOSIS — I21.9 ACUTE MYOCARDIAL INFARCTION, UNSPECIFIED: ICD-10-CM

## 2024-08-26 DIAGNOSIS — M65.312 TRIGGER THUMB, LEFT THUMB: ICD-10-CM

## 2024-08-26 DIAGNOSIS — Z90.49 ACQUIRED ABSENCE OF OTHER SPECIFIED PARTS OF DIGESTIVE TRACT: ICD-10-CM

## 2024-08-26 PROCEDURE — G0439: CPT

## 2024-08-26 PROCEDURE — 99214 OFFICE O/P EST MOD 30 MIN: CPT | Mod: 25

## 2024-08-26 PROCEDURE — G2211 COMPLEX E/M VISIT ADD ON: CPT | Mod: NC

## 2024-08-26 RX ORDER — MEDROXYPROGESTERONE ACETATE 10 MG/1
10 TABLET ORAL DAILY
Refills: 0 | Status: ACTIVE | COMMUNITY

## 2024-08-26 RX ORDER — ESTRADIOL 0.03 MG/D
0.03 PATCH TRANSDERMAL
Refills: 0 | Status: ACTIVE | COMMUNITY

## 2024-08-26 RX ORDER — LORATADINE 10 MG
17 TABLET,DISINTEGRATING ORAL
Refills: 0 | Status: ACTIVE | COMMUNITY

## 2024-09-27 ENCOUNTER — APPOINTMENT (OUTPATIENT)
Dept: CARDIOLOGY | Facility: CLINIC | Age: 69
End: 2024-09-27
Payer: MEDICARE

## 2024-09-27 ENCOUNTER — NON-APPOINTMENT (OUTPATIENT)
Age: 69
End: 2024-09-27

## 2024-09-27 VITALS
WEIGHT: 159 LBS | HEIGHT: 61 IN | BODY MASS INDEX: 30.02 KG/M2 | HEART RATE: 66 BPM | SYSTOLIC BLOOD PRESSURE: 168 MMHG | DIASTOLIC BLOOD PRESSURE: 101 MMHG | OXYGEN SATURATION: 96 %

## 2024-09-27 DIAGNOSIS — I25.10 ATHEROSCLEROTIC HEART DISEASE OF NATIVE CORONARY ARTERY W/OUT ANGINA PECTORIS: ICD-10-CM

## 2024-09-27 PROCEDURE — 99214 OFFICE O/P EST MOD 30 MIN: CPT

## 2024-09-27 PROCEDURE — G2211 COMPLEX E/M VISIT ADD ON: CPT

## 2024-09-27 PROCEDURE — 93000 ELECTROCARDIOGRAM COMPLETE: CPT

## 2024-09-27 NOTE — CARDIOLOGY SUMMARY
[No Ischemia] : no Ischemia [___] : [unfilled] [LVEF ___%] : LVEF [unfilled]% [None] : normal LV function [Normal] : normal LA size [Mild] : mild mitral regurgitation [___] : [unfilled] [de-identified] : NSR with no acute changes. [de-identified] : 2/2021:  No evidence of ischemia.  [de-identified] : 2/2021:  Normal LV function

## 2024-09-27 NOTE — DISCUSSION/SUMMARY
[FreeTextEntry1] : This is a 69 year old woman with a history of CAD s/p PCI to the proximal LAD with a Taxus stent in 2007, PCI to the RI in March of 2012 in the setting of a mild NSTEMI, hyperlipidemia, and hypertension presents to the office for a follow up visit.       She will continue DAPT with aspirin and Plavix.  Her pressure is better when checked at home.  She will continue losartan 100 QD.  She will continue Toprol 50 QD.   She needs to get her weight back down, and start exercising, which she knows.   She is tolerating simvastatin, and her most recent LDL was at goal.  She will continue her current dose of Zetia.  She is to continue dual antiplatelet therapy.  We discussed the importance of a healthy diet, regular exercise, and weight loss.    [EKG obtained to assist in diagnosis and management of assessed problem(s)] : EKG obtained to assist in diagnosis and management of assessed problem(s)

## 2024-09-27 NOTE — HISTORY OF PRESENT ILLNESS
[FreeTextEntry1] : This is a 69 year old woman with a history of CAD s/p PCI to the proximal LAD with a Taxus stent in 2007, PCI to the RI in March of 2012 in the setting of a mild NSTEMI, hyperlipidemia, and hypertension presents to the office for a follow up visit.   Form a cardiac perspective, she has been feeling well since her last visit.  She had LE edema on amlodipine, and has stopped it.  Her BP has been better controlled when checked at home.  She is denying chest pain or exertional dyspnea.    She denies dyspnea, PND, orthopnea, LE swelling, or syncope.  She has no further myalgia on her current dose of simvastatin.  Her LDL is controlled.   She had reflux type chest pain that resolved when she eliminated red onions from her salad.   She had a nuclear stress test that showed no ischemia, and an echocardiogram that showed normal LV function with no significant valvular heart disease.

## 2024-10-01 ENCOUNTER — APPOINTMENT (OUTPATIENT)
Dept: ORTHOPEDIC SURGERY | Facility: CLINIC | Age: 69
End: 2024-10-01
Payer: MEDICARE

## 2024-10-01 VITALS — HEIGHT: 61 IN | WEIGHT: 155 LBS | BODY MASS INDEX: 29.27 KG/M2

## 2024-10-01 DIAGNOSIS — M19.011 PRIMARY OSTEOARTHRITIS, RIGHT SHOULDER: ICD-10-CM

## 2024-10-01 DIAGNOSIS — M75.121 COMPLETE ROTATOR CUFF TEAR OR RUPTURE OF RIGHT SHOULDER, NOT SPECIFIED AS TRAUMATIC: ICD-10-CM

## 2024-10-01 PROCEDURE — 99214 OFFICE O/P EST MOD 30 MIN: CPT | Mod: 25

## 2024-10-01 PROCEDURE — 73010 X-RAY EXAM OF SHOULDER BLADE: CPT | Mod: RT

## 2024-10-01 PROCEDURE — 73030 X-RAY EXAM OF SHOULDER: CPT | Mod: RT

## 2024-10-01 PROCEDURE — J3490M: CUSTOM

## 2024-10-01 PROCEDURE — 20611 DRAIN/INJ JOINT/BURSA W/US: CPT | Mod: RT

## 2024-10-01 NOTE — PHYSICAL EXAM
[Right] : right shoulder [5 ___] : forward flexion 5[unfilled]/5 [5___] : internal rotation 5[unfilled]/5 [] : discomfort with strength testing [FreeTextEntry9] : FE 100A 120P ER 60A

## 2024-10-01 NOTE — HISTORY OF PRESENT ILLNESS
[Sudden] : sudden [9] : 9 [0] : 0 [Work] : work [Sleep] : sleep [de-identified] : 10/1/24: Here for follow up right shoulder pain. She was doing ok with PT until recently. She states pain worsened after lifting 50 lb dog 9/28/24. She had increased pain the next day. There is pain anterior. She has pain raising her arm. There is some pain into her axilla. Denies numbness/tingling.   3/21/24: Here for follow up. She is in PT which helps but she does have soreness after exercises. There is pain reaching overhead and getting dressed.  2/15/24: 67 yo RHD female with right shoulder pain since Jan 2024. She reports increased pain since carrying luggage while travel. She tried diclofenac. She states 2/14/24 she pulled a rolling chair and felt severe pain in her shoulder. She has history of RCT treated with CSI and PT. H/o shoulder dislocation as a child.  PMHx: CAD  [] : no [FreeTextEntry1] : RT shosteve pain  [FreeTextEntry4] : 9/29/2024 [FreeTextEntry5] : Follow up for RT shoulder pain that radiates sharp pain down to under the arm. Neck can sometimes feel tight and sore. Patient was picking up her dog over the weekend which triggered something and now the pain is back. Patient can only lift arm to her chin but not above head. She said PT in the past did not really help. [FreeTextEntry7] : under arm [de-identified] : PT

## 2024-10-01 NOTE — IMAGING
[Right] : right shoulder [Glenohumeral arthritis] : Glenohumeral arthritis [FreeTextEntry1] : small inferior osteophyte

## 2024-10-01 NOTE — ASSESSMENT
[FreeTextEntry1] : R shoulder strain, H/o RCT Activity modification. Ice. Tylenol prn. R SA injection 2/15/24, good relief. She had new lifting injury and now increased pain. Recommend resuming PT. Repeat SA injection given. Will get MRI if pain persists. RTO 6-8 weeks.  Procedure Note: Large Joint Injection was performed because of pain and inflammation, failure of conservative treatment.   Medications: Depo-Medrol: 1 cc, 80 mg. Lidocaine: 2 cc, 1%.  Marcaine: 2 cc, .25%.   Medication was injected in the right subacromial space. Patient has tried OTC's including aspirin, Ibuprofen, Aleve etc or prescription NSAIDs, and/or exercises at home and/ or physical therapy without satisfactory response. The risks, benefits, and alternatives to cortisone injection were explained in full to the patient. Risks outlined include but are not limited to infection, sepsis, bleeding, scarring, skin discoloration, temporary increase in pain, syncopal episode, failure to resolve symptoms, allergic reaction, symptom recurrence, and elevation of blood sugar in diabetics. Patient understood the risks. All questions were answered. After discussion of options, patient requested an injection. Oral informed consent was obtained and sterile prep of the injection site was performed using alcohol. Sterile technique was utilized for the procedure including the preparation of the solutions used for the injection. Ethyl chloride spray was used topically.  Sterile technique used. Patient tolerated procedure well. Post Procedure Instructions: Patient was advised to call if redness, pain, or fever occur and apply ice for 15 min. out of every hour for the next 12-24 hours as tolerated. patient was advised to rest the joint(s) for 2 days.  Ultrasound Guidance was used for the following reasons: for prior failure or difficult injection and to visualize tearing and inflammation. Ultrasound guided injection was performed of the shoulder, visualization of the needle and placement of injection was performed without complication.

## 2024-10-07 ENCOUNTER — TRANSCRIPTION ENCOUNTER (OUTPATIENT)
Age: 69
End: 2024-10-07

## 2024-10-21 NOTE — ED PROVIDER NOTE - TIMING
Billing Type: Third-Party Bill Performing Laboratory: [id number] Expected Date Of Service: 10/21/2024 Bill For Surgical Tray: no constant

## 2024-10-24 ENCOUNTER — TRANSCRIPTION ENCOUNTER (OUTPATIENT)
Age: 69
End: 2024-10-24

## 2024-11-01 ENCOUNTER — APPOINTMENT (OUTPATIENT)
Dept: GASTROENTEROLOGY | Facility: HOSPITAL | Age: 69
End: 2024-11-01

## 2024-11-12 ENCOUNTER — APPOINTMENT (OUTPATIENT)
Dept: ORTHOPEDIC SURGERY | Facility: CLINIC | Age: 69
End: 2024-11-12

## 2024-11-18 ENCOUNTER — RX RENEWAL (OUTPATIENT)
Age: 69
End: 2024-11-18

## 2024-11-21 NOTE — ED PROVIDER NOTE - CARDIAC, MLM
Has COPD, not currently on inhalers  Consider re-evaluation by PCP     Normal rate, regular rhythm.  Heart sounds S1, S2

## 2024-11-22 ENCOUNTER — APPOINTMENT (OUTPATIENT)
Dept: ORTHOPEDIC SURGERY | Facility: CLINIC | Age: 69
End: 2024-11-22
Payer: MEDICARE

## 2024-11-22 DIAGNOSIS — M65.311 TRIGGER THUMB, RIGHT THUMB: ICD-10-CM

## 2024-11-22 PROCEDURE — 20550 NJX 1 TENDON SHEATH/LIGAMENT: CPT | Mod: RT

## 2024-11-27 ENCOUNTER — APPOINTMENT (OUTPATIENT)
Dept: MRI IMAGING | Facility: CLINIC | Age: 69
End: 2024-11-27
Payer: MEDICARE

## 2024-11-27 PROCEDURE — 73221 MRI JOINT UPR EXTREM W/O DYE: CPT | Mod: RT

## 2024-12-02 ENCOUNTER — NON-APPOINTMENT (OUTPATIENT)
Age: 69
End: 2024-12-02

## 2024-12-02 ENCOUNTER — APPOINTMENT (OUTPATIENT)
Dept: CARDIOLOGY | Facility: CLINIC | Age: 69
End: 2024-12-02
Payer: MEDICARE

## 2024-12-02 VITALS
DIASTOLIC BLOOD PRESSURE: 79 MMHG | OXYGEN SATURATION: 97 % | HEIGHT: 61 IN | HEART RATE: 77 BPM | SYSTOLIC BLOOD PRESSURE: 136 MMHG

## 2024-12-02 DIAGNOSIS — I10 ESSENTIAL (PRIMARY) HYPERTENSION: ICD-10-CM

## 2024-12-02 PROCEDURE — 93000 ELECTROCARDIOGRAM COMPLETE: CPT

## 2024-12-02 PROCEDURE — 99214 OFFICE O/P EST MOD 30 MIN: CPT

## 2024-12-02 PROCEDURE — G2211 COMPLEX E/M VISIT ADD ON: CPT

## 2024-12-15 LAB
ALBUMIN SERPL ELPH-MCNC: 4.3 G/DL
ALP BLD-CCNC: 76 U/L
ALT SERPL-CCNC: 28 U/L
ANION GAP SERPL CALC-SCNC: 16 MMOL/L
APPEARANCE: CLEAR
AST SERPL-CCNC: 26 U/L
BASOPHILS # BLD AUTO: 0.05 K/UL
BASOPHILS NFR BLD AUTO: 0.8 %
BILIRUB SERPL-MCNC: 0.3 MG/DL
BILIRUBIN URINE: NEGATIVE
BLOOD URINE: NEGATIVE
BUN SERPL-MCNC: 15 MG/DL
CALCIUM SERPL-MCNC: 9.3 MG/DL
CHLORIDE SERPL-SCNC: 98 MMOL/L
CHOLEST SERPL-MCNC: 160 MG/DL
CO2 SERPL-SCNC: 23 MMOL/L
COLOR: YELLOW
CREAT SERPL-MCNC: 0.69 MG/DL
EGFR: 94 ML/MIN/1.73M2
EOSINOPHIL # BLD AUTO: 0.22 K/UL
EOSINOPHIL NFR BLD AUTO: 3.6 %
ESTIMATED AVERAGE GLUCOSE: 128 MG/DL
GLUCOSE QUALITATIVE U: NEGATIVE MG/DL
GLUCOSE SERPL-MCNC: 114 MG/DL
HBA1C MFR BLD HPLC: 6.1 %
HCT VFR BLD CALC: 43.5 %
HDLC SERPL-MCNC: 75 MG/DL
HGB BLD-MCNC: 14.2 G/DL
IMM GRANULOCYTES NFR BLD AUTO: 0.3 %
KETONES URINE: NEGATIVE MG/DL
LDLC SERPL CALC-MCNC: 70 MG/DL
LEUKOCYTE ESTERASE URINE: NEGATIVE
LYMPHOCYTES # BLD AUTO: 1.9 K/UL
LYMPHOCYTES NFR BLD AUTO: 30.9 %
MAN DIFF?: NORMAL
MCHC RBC-ENTMCNC: 30.2 PG
MCHC RBC-ENTMCNC: 32.6 G/DL
MCV RBC AUTO: 92.6 FL
MONOCYTES # BLD AUTO: 0.56 K/UL
MONOCYTES NFR BLD AUTO: 9.1 %
NEUTROPHILS # BLD AUTO: 3.39 K/UL
NEUTROPHILS NFR BLD AUTO: 55.3 %
NITRITE URINE: NEGATIVE
NONHDLC SERPL-MCNC: 86 MG/DL
PH URINE: 7
PLATELET # BLD AUTO: 279 K/UL
POTASSIUM SERPL-SCNC: 4.6 MMOL/L
PROT SERPL-MCNC: 6.9 G/DL
PROTEIN URINE: NEGATIVE MG/DL
RBC # BLD: 4.7 M/UL
RBC # FLD: 13.2 %
SODIUM SERPL-SCNC: 137 MMOL/L
SPECIFIC GRAVITY URINE: 1
TRIGL SERPL-MCNC: 84 MG/DL
UROBILINOGEN URINE: 0.2 MG/DL
WBC # FLD AUTO: 6.14 K/UL

## 2024-12-17 ENCOUNTER — APPOINTMENT (OUTPATIENT)
Dept: ORTHOPEDIC SURGERY | Facility: CLINIC | Age: 69
End: 2024-12-17
Payer: MEDICARE

## 2024-12-17 DIAGNOSIS — M19.011 PRIMARY OSTEOARTHRITIS, RIGHT SHOULDER: ICD-10-CM

## 2024-12-17 DIAGNOSIS — M75.121 COMPLETE ROTATOR CUFF TEAR OR RUPTURE OF RIGHT SHOULDER, NOT SPECIFIED AS TRAUMATIC: ICD-10-CM

## 2024-12-17 PROCEDURE — 99214 OFFICE O/P EST MOD 30 MIN: CPT

## 2024-12-20 ENCOUNTER — APPOINTMENT (OUTPATIENT)
Dept: INTERNAL MEDICINE | Facility: CLINIC | Age: 69
End: 2024-12-20
Payer: MEDICARE

## 2024-12-20 VITALS
HEIGHT: 61 IN | DIASTOLIC BLOOD PRESSURE: 82 MMHG | OXYGEN SATURATION: 97 % | TEMPERATURE: 98 F | SYSTOLIC BLOOD PRESSURE: 163 MMHG | BODY MASS INDEX: 28.89 KG/M2 | WEIGHT: 153 LBS | HEART RATE: 72 BPM | RESPIRATION RATE: 16 BRPM

## 2024-12-20 VITALS — DIASTOLIC BLOOD PRESSURE: 82 MMHG | SYSTOLIC BLOOD PRESSURE: 140 MMHG

## 2024-12-20 DIAGNOSIS — I25.10 ATHEROSCLEROTIC HEART DISEASE OF NATIVE CORONARY ARTERY W/OUT ANGINA PECTORIS: ICD-10-CM

## 2024-12-20 DIAGNOSIS — I10 ESSENTIAL (PRIMARY) HYPERTENSION: ICD-10-CM

## 2024-12-20 DIAGNOSIS — E78.5 HYPERLIPIDEMIA, UNSPECIFIED: ICD-10-CM

## 2024-12-20 DIAGNOSIS — Z01.818 ENCOUNTER FOR OTHER PREPROCEDURAL EXAMINATION: ICD-10-CM

## 2024-12-20 DIAGNOSIS — R94.31 ABNORMAL ELECTROCARDIOGRAM [ECG] [EKG]: ICD-10-CM

## 2024-12-20 DIAGNOSIS — K21.9 GASTRO-ESOPHAGEAL REFLUX DISEASE W/OUT ESOPHAGITIS: ICD-10-CM

## 2024-12-20 DIAGNOSIS — F43.21 ADJUSTMENT DISORDER WITH DEPRESSED MOOD: ICD-10-CM

## 2024-12-20 DIAGNOSIS — I21.9 ACUTE MYOCARDIAL INFARCTION, UNSPECIFIED: ICD-10-CM

## 2024-12-20 DIAGNOSIS — R10.32 LEFT LOWER QUADRANT PAIN: ICD-10-CM

## 2024-12-20 DIAGNOSIS — R23.2 FLUSHING: ICD-10-CM

## 2024-12-20 DIAGNOSIS — R73.03 PREDIABETES.: ICD-10-CM

## 2024-12-20 DIAGNOSIS — N95.0 POSTMENOPAUSAL BLEEDING: ICD-10-CM

## 2024-12-20 PROCEDURE — G2211 COMPLEX E/M VISIT ADD ON: CPT

## 2024-12-20 PROCEDURE — 99215 OFFICE O/P EST HI 40 MIN: CPT

## 2024-12-21 PROBLEM — Z01.818 PREOP EXAMINATION: Status: ACTIVE | Noted: 2024-12-20

## 2024-12-21 PROBLEM — F43.21 GRIEVING: Status: ACTIVE | Noted: 2024-12-20

## 2024-12-21 PROBLEM — N95.0 PMB (POSTMENOPAUSAL BLEEDING): Status: ACTIVE | Noted: 2024-12-20

## 2025-01-02 ENCOUNTER — NON-APPOINTMENT (OUTPATIENT)
Age: 70
End: 2025-01-02

## 2025-01-09 ENCOUNTER — TRANSCRIPTION ENCOUNTER (OUTPATIENT)
Age: 70
End: 2025-01-09

## 2025-01-09 DIAGNOSIS — J06.9 ACUTE UPPER RESPIRATORY INFECTION, UNSPECIFIED: ICD-10-CM

## 2025-01-09 RX ORDER — AZITHROMYCIN 250 MG/1
250 TABLET, FILM COATED ORAL
Qty: 1 | Refills: 0 | Status: ACTIVE | COMMUNITY
Start: 2025-01-09 | End: 1900-01-01

## 2025-01-10 ENCOUNTER — TRANSCRIPTION ENCOUNTER (OUTPATIENT)
Age: 70
End: 2025-01-10

## 2025-01-11 ENCOUNTER — RX RENEWAL (OUTPATIENT)
Age: 70
End: 2025-01-11

## 2025-01-13 ENCOUNTER — OUTPATIENT (OUTPATIENT)
Dept: OUTPATIENT SERVICES | Facility: HOSPITAL | Age: 70
LOS: 1 days | End: 2025-01-13
Payer: MEDICARE

## 2025-01-13 VITALS
HEART RATE: 74 BPM | TEMPERATURE: 99 F | WEIGHT: 158.07 LBS | OXYGEN SATURATION: 95 % | DIASTOLIC BLOOD PRESSURE: 90 MMHG | RESPIRATION RATE: 16 BRPM | SYSTOLIC BLOOD PRESSURE: 159 MMHG

## 2025-01-13 DIAGNOSIS — Z98.890 OTHER SPECIFIED POSTPROCEDURAL STATES: Chronic | ICD-10-CM

## 2025-01-13 DIAGNOSIS — N95.0 POSTMENOPAUSAL BLEEDING: ICD-10-CM

## 2025-01-13 DIAGNOSIS — Z01.818 ENCOUNTER FOR OTHER PREPROCEDURAL EXAMINATION: ICD-10-CM

## 2025-01-13 DIAGNOSIS — C53.9 MALIGNANT NEOPLASM OF CERVIX UTERI, UNSPECIFIED: Chronic | ICD-10-CM

## 2025-01-13 DIAGNOSIS — I25.10 ATHEROSCLEROTIC HEART DISEASE OF NATIVE CORONARY ARTERY WITHOUT ANGINA PECTORIS: ICD-10-CM

## 2025-01-13 LAB — BLD GP AB SCN SERPL QL: SIGNIFICANT CHANGE UP

## 2025-01-13 PROCEDURE — 86850 RBC ANTIBODY SCREEN: CPT

## 2025-01-13 PROCEDURE — G0463: CPT

## 2025-01-13 PROCEDURE — 86900 BLOOD TYPING SEROLOGIC ABO: CPT

## 2025-01-13 PROCEDURE — 36415 COLL VENOUS BLD VENIPUNCTURE: CPT

## 2025-01-13 PROCEDURE — 86901 BLOOD TYPING SEROLOGIC RH(D): CPT

## 2025-01-13 NOTE — H&P PST ADULT - ASSESSMENT
[FreeTextEntry1] : 13-year-old male with spinal asymmetry and mild postural kyphosis. He is planning to undergo surgery for pectus excavatum deformity this summer\par \par Today's assessment was performed with the assistance of the patient's parent as an independent historian. I have explained these findings to the patient and parent. Natural history of scoliosis discussed at length. Child is  13.5 years of age, Risser 0 with significant skeletal growth remaining. This curve has the potential to increase in magnitude. We'll proceed with observation at this time. I recommended follow up on an annual basis.  If the curve increases to 25 or 30°, I will recommend a brace.   As for postural kyphosis, I have recommended daily back and core strengthening for postural support.Scoliosis  PA and lateral full spine x-rays will be done at followup.activities as tolerated.All questions answered, understanding verbalized. Parent and patient in agreement with plan of care.\par \par I, Hortencia Welsh, have acted as a scribe and documented the above information for Dr. Collins\par \par The above documentation completed by the scribe is an accurate record of both my words and actions. 68 y/o female with postmenopausal bleeding.

## 2025-01-13 NOTE — H&P PST ADULT - GENERAL COMMENTS
Denies recent illness in the last 2 weeks, denies cold, flu and viral infection. Pt reports to being diagnosed with URI and last dose of Zpack will finish tomorrow.

## 2025-01-13 NOTE — H&P PST ADULT - NSICDXPASTMEDICALHX_GEN_ALL_CORE_FT
PAST MEDICAL HISTORY:  CAD (coronary artery disease)     Cervical cancer with leep procedure, denies chemo/ radiation    Diverticulitis     GERD (gastroesophageal reflux disease)     Hypercholesteremia     Hypertension     S/P coronary artery stent placement 2007 in [place] and 2012 in [place]    Seasonal allergies      PAST MEDICAL HISTORY:  CAD (coronary artery disease)     Cervical cancer with leep procedure, denies chemo/ radiation    Diverticulitis     GERD (gastroesophageal reflux disease)     Hypercholesteremia     Hypertension     Postmenopausal bleeding     Seasonal allergies

## 2025-01-13 NOTE — H&P PST ADULT - PROBLEM SELECTOR PLAN 1
Preop instructions and chlorhexidine soap given  Labs CBC BMP performed in PST  Instructed to take last dose of Plavix on 11/10 or as directed by Cardiologist.   Pt may continue with Aspirin regimen  Covid test on 11/15 Dilation and curettage hysteroscopy with ultrasound guidance on 1/22/25.

## 2025-01-13 NOTE — H&P PST ADULT - PROBLEM SELECTOR PLAN 3
Cardiac clearance needed and received in chart. Instructed pt to continue baby aspirin surgery. Stop Plavix 5 days before surgery as per cardiologist. Pt verbalized understanding.

## 2025-01-13 NOTE — H&P PST ADULT - PROBLEM SELECTOR PLAN 2
Call mom left voicemail to call clinic  Pt instructed to take BP meds with sip of water on DOS Medical clearance needed as per surgeon and received in chart. CBC, Comprehensive panel (12/13/24) and EKG (12/2/24) in chart. T&S ordered. Pre-op instructions given and pt verbalized understanding.

## 2025-01-13 NOTE — H&P PST ADULT - RESPIRATORY
2035 clear to auscultation bilaterally/good air movement/respirations non-labored normal/clear to auscultation bilaterally/good air movement/respirations non-labored

## 2025-01-13 NOTE — H&P PST ADULT - MUSCULOSKELETAL
ROM intact/normal gait negative normal/ROM intact/normal gait/strength 5/5 bilateral upper extremities/strength 5/5 bilateral lower extremities

## 2025-01-13 NOTE — H&P PST ADULT - HISTORY OF PRESENT ILLNESS
LMP - Age 60.  68 y/o female with PMH of CAD (s/p coronary stents x 2), cervical ca, HTN and HLD here for PST. Pt states she had 2 episodes of vaginal bleeding while on estrogen patch. Pt states vaginal bleeding stopped after discontinuing estrogen patch. LMP - Age 60. Pt denies n/v/d and current abdominal pain. Pt electing for dilation and curettage hysteroscopy with ultrasound guidance on 1/22/25.  68 y/o female with PMH of CAD (s/p coronary stents x 2), cervical ca, HTN and HLD here for PST. Pt states she had 2 episodes of vaginal bleeding while on estrogen patch in 10/2024. Pt states vaginal bleeding stopped after discontinuing estrogen patch. LMP - Age 60. Pt denies n/v/d and current abdominal pain. Pt electing for dilation and curettage hysteroscopy with ultrasound guidance on 1/22/25.

## 2025-01-13 NOTE — H&P PST ADULT - WEIGHT IN KG
I called to inform the patient markers of inflammation are normal and her urine looks normal as well  no answer LVM.   71.7

## 2025-01-13 NOTE — H&P PST ADULT - NSICDXPASTSURGICALHX_GEN_ALL_CORE_FT
PAST SURGICAL HISTORY:  Cervical cancer LEEP    S/P angioplasty with stent  in L[place]  and  in [place]    S/P colon resection     S/P cone biopsy of cervix     S/P hammer toe correction     Status post bunionectomy right with hardware    Status post

## 2025-01-14 PROBLEM — T14.8XXA OTHER INJURY OF UNSPECIFIED BODY REGION, INITIAL ENCOUNTER: Chronic | Status: INACTIVE | Noted: 2019-02-20 | Resolved: 2025-01-13

## 2025-01-14 PROBLEM — T78.40XA ALLERGY, UNSPECIFIED, INITIAL ENCOUNTER: Chronic | Status: INACTIVE | Noted: 2019-02-20 | Resolved: 2025-01-13

## 2025-01-21 RX ORDER — SODIUM CHLORIDE 9 G/ML
1000 INJECTION, SOLUTION INTRAVENOUS
Refills: 0 | Status: DISCONTINUED | OUTPATIENT
Start: 2025-01-22 | End: 2025-01-22

## 2025-01-21 NOTE — ASU PATIENT PROFILE, ADULT - NS PRO INFO GIVEN TO
Medication:   Requested Prescriptions     Pending Prescriptions Disp Refills    allopurinol (ZYLOPRIM) 100 MG tablet [Pharmacy Med Name: Allopurinol 100 MG Oral Tablet] 100 tablet 2     Sig: TAKE 1 TABLET BY MOUTH  DAILY     Last Filled:  10/06/22    Last appt: 4/3/2023   Next appt: Visit date not found    Last OARRS: No flowsheet data found. patient
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
## 2025-01-21 NOTE — ASU PATIENT PROFILE, ADULT - FALL HARM RISK - UNIVERSAL INTERVENTIONS
Bed in lowest position, wheels locked, appropriate side rails in place/Call bell, personal items and telephone in reach/Instruct patient to call for assistance before getting out of bed or chair/Non-slip footwear when patient is out of bed/[place] to call system/Physically safe environment - no spills, clutter or unnecessary equipment/Purposeful Proactive Rounding/Room/bathroom lighting operational, light cord in reach

## 2025-01-21 NOTE — ASU PATIENT PROFILE, ADULT - NSICDXPASTMEDICALHX_GEN_ALL_CORE_FT
PAST MEDICAL HISTORY:  CAD (coronary artery disease)     Cervical cancer with leep procedure, denies chemo/ radiation    Diverticulitis     GERD (gastroesophageal reflux disease)     Hypercholesteremia     Hypertension     Postmenopausal bleeding     Seasonal allergies

## 2025-01-22 ENCOUNTER — OUTPATIENT (OUTPATIENT)
Dept: OUTPATIENT SERVICES | Facility: HOSPITAL | Age: 70
LOS: 1 days | End: 2025-01-22
Payer: MEDICARE

## 2025-01-22 ENCOUNTER — TRANSCRIPTION ENCOUNTER (OUTPATIENT)
Age: 70
End: 2025-01-22

## 2025-01-22 VITALS
HEART RATE: 80 BPM | RESPIRATION RATE: 13 BRPM | OXYGEN SATURATION: 97 % | TEMPERATURE: 98 F | SYSTOLIC BLOOD PRESSURE: 168 MMHG | DIASTOLIC BLOOD PRESSURE: 78 MMHG

## 2025-01-22 VITALS
HEART RATE: 82 BPM | OXYGEN SATURATION: 98 % | DIASTOLIC BLOOD PRESSURE: 85 MMHG | WEIGHT: 158.07 LBS | SYSTOLIC BLOOD PRESSURE: 191 MMHG | TEMPERATURE: 98 F | RESPIRATION RATE: 16 BRPM

## 2025-01-22 DIAGNOSIS — C53.9 MALIGNANT NEOPLASM OF CERVIX UTERI, UNSPECIFIED: Chronic | ICD-10-CM

## 2025-01-22 DIAGNOSIS — Z98.890 OTHER SPECIFIED POSTPROCEDURAL STATES: Chronic | ICD-10-CM

## 2025-01-22 DIAGNOSIS — N95.0 POSTMENOPAUSAL BLEEDING: ICD-10-CM

## 2025-01-22 PROCEDURE — 88307 TISSUE EXAM BY PATHOLOGIST: CPT

## 2025-01-22 PROCEDURE — 76998 US GUIDE INTRAOP: CPT

## 2025-01-22 PROCEDURE — 88305 TISSUE EXAM BY PATHOLOGIST: CPT

## 2025-01-22 PROCEDURE — 58558 HYSTEROSCOPY BIOPSY: CPT

## 2025-01-22 PROCEDURE — 88307 TISSUE EXAM BY PATHOLOGIST: CPT | Mod: 26

## 2025-01-22 PROCEDURE — 57522 CONIZATION OF CERVIX: CPT

## 2025-01-22 PROCEDURE — 88305 TISSUE EXAM BY PATHOLOGIST: CPT | Mod: 26

## 2025-01-22 RX ORDER — ALPRAZOLAM 0.25 MG/1
0 TABLET ORAL
Qty: 0 | Refills: 0 | DISCHARGE

## 2025-01-22 RX ORDER — ASCORBIC ACID 1000 MG
1 TABLET ORAL
Refills: 0 | DISCHARGE

## 2025-01-22 RX ORDER — LYSINE 500 MG
1 TABLET ORAL
Refills: 0 | DISCHARGE

## 2025-01-22 RX ORDER — AZITHROMYCIN MONOHYDRATE 200 MG/5ML
0 POWDER, FOR SUSPENSION ORAL
Refills: 0 | DISCHARGE

## 2025-01-22 RX ORDER — FAMOTIDINE 20 MG/1
1 TABLET, FILM COATED ORAL
Refills: 0 | DISCHARGE

## 2025-01-22 RX ORDER — ASPIRIN 81 MG
1 TABLET, DELAYED RELEASE (ENTERIC COATED) ORAL
Refills: 0 | DISCHARGE

## 2025-01-22 RX ORDER — FLUTICASONE PROPIONATE 50 UG/1
1 SPRAY, METERED NASAL
Refills: 0 | DISCHARGE

## 2025-01-22 RX ORDER — ACETAMINOPHEN 160 MG/5ML
650 SUSPENSION ORAL ONCE
Refills: 0 | Status: COMPLETED | OUTPATIENT
Start: 2025-01-22 | End: 2025-01-22

## 2025-01-22 RX ORDER — LOSARTAN POTASSIUM 100 MG/1
1 TABLET, FILM COATED ORAL
Refills: 0 | DISCHARGE

## 2025-01-22 RX ADMIN — SODIUM CHLORIDE 75 MILLILITER(S): 9 INJECTION, SOLUTION INTRAVENOUS at 10:06

## 2025-01-22 RX ADMIN — ACETAMINOPHEN 650 MILLIGRAM(S): 160 SUSPENSION ORAL at 10:06

## 2025-01-22 NOTE — ASU DISCHARGE PLAN (ADULT/PEDIATRIC) - FINANCIAL ASSISTANCE
[facility] provides services at a reduced cost to those who are determined to be eligible through [facility]’s financial assistance program. Information regarding [facility]’s financial assistance program can be found by going to [email or web address] or by calling [phone].

## 2025-01-22 NOTE — BRIEF OPERATIVE NOTE - NSICDXBRIEFPOSTOP_GEN_ALL_CORE_FT
POST-OP DIAGNOSIS:  Thickened endometrium 22-Jan-2025 12:56:50  Dante Ho  Cervical os stenosis 22-Jan-2025 12:56:45  Dante Ho

## 2025-01-22 NOTE — BRIEF OPERATIVE NOTE - NSICDXBRIEFPROCEDURE_GEN_ALL_CORE_FT
PROCEDURES:  Hysteroscopy, with dilation and curettage 22-Jan-2025 12:56:00  Dante Ho  Loop electrosurgical excision of cervix 22-Jan-2025 12:56:10  Dante Ho

## 2025-01-22 NOTE — BRIEF OPERATIVE NOTE - NSICDXBRIEFPREOP_GEN_ALL_CORE_FT
PRE-OP DIAGNOSIS:  Cervical os stenosis 22-Jan-2025 12:56:26  Dante Ho  Thickened endometrium 22-Jan-2025 12:56:34  Dante Ho

## 2025-01-22 NOTE — ASU PREOP CHECKLIST - DENTURES
Render In Strict Bullet Format?: No
Detail Level: Simple
Continue Regimen: Benaclin gel qd-bid and Tretinoin 0.1% cream qHS.
no

## 2025-01-22 NOTE — ASU PREOP CHECKLIST - NSBLOODTRANS_GEN_A_CORE_SIUH
04/06/22 1135   Wound Ankle Left;Medial   Date First Assessed/Time First Assessed: 03/24/22 1432   Present on Hospital Admission: Yes  Wound Approximate Age at First Assessment (Weeks): 5 weeks  Primary Wound Type: Traumatic  Location: Ankle  Wound Location Orientation: Left;Medial   Wound Image    Wound Etiology Traumatic   Dressing Status Old drainage noted   Cleansed Cleansed with saline   Dressing/Treatment Packing   Wound Length (cm) 3.5 cm   Wound Width (cm) 3.5 cm   Wound Depth (cm) 0.7 cm   Wound Surface Area (cm^2) 12.25 cm^2   Change in Wound Size % 12.5   Wound Volume (cm^3) 8.575 cm^3   Wound Healing % 39   Undermining Starts ___ O'Clock 5 o'clock   Undermining Ends ___ O'Clock 7 o'clock   Undermining Maximum Distance (cm) 0.5 cm   Wound Assessment Slough;Granulation tissue;Pink/red   Drainage Amount Moderate   Drainage Description Serosanguinous   Wound Odor None   Reanna-Wound/Incision Assessment Intact   Wound Thickness Description Full thickness     Patient is currently taking aspirin and eliquis daily no...

## 2025-01-22 NOTE — ASU DISCHARGE PLAN (ADULT/PEDIATRIC) - NS MD DC FALL RISK RISK
For information on Fall & Injury Prevention, visit: [email or web address] OR  [email or web address] OR  https://www.cdc.gov/steadi/patient.html

## 2025-01-22 NOTE — BRIEF OPERATIVE NOTE - OPERATION/FINDINGS
Patient with thickened endometrium and postmenopausal bleeding (while on estrogen). Known cervical stenosis with previous LEEP x 2 and cone biopsy. D&C under ultrasound guidance. External os closed and impenetrable with dilator. LEEP performed to unroof external os with easy entry into canal under sono guidance. Uterine cavity well visualized with both tubal ostia seen. Unremarkable/atrophic endometrium. Sharp and suction curettage performed with scanty specimen, to pathology. Monsel's applied. EBL = 5 cc.

## 2025-01-27 PROBLEM — N95.0 POSTMENOPAUSAL BLEEDING: Chronic | Status: ACTIVE | Noted: 2025-01-13

## 2025-01-27 PROBLEM — I25.10 ATHEROSCLEROTIC HEART DISEASE OF NATIVE CORONARY ARTERY WITHOUT ANGINA PECTORIS: Chronic | Status: ACTIVE | Noted: 2025-01-13

## 2025-01-27 LAB — SURGICAL PATHOLOGY STUDY: SIGNIFICANT CHANGE UP

## 2025-02-07 ENCOUNTER — OUTPATIENT (OUTPATIENT)
Dept: OUTPATIENT SERVICES | Facility: HOSPITAL | Age: 70
LOS: 1 days | End: 2025-02-07
Payer: MEDICARE

## 2025-02-07 ENCOUNTER — APPOINTMENT (OUTPATIENT)
Dept: MAMMOGRAPHY | Facility: CLINIC | Age: 70
End: 2025-02-07
Payer: MEDICARE

## 2025-02-07 ENCOUNTER — APPOINTMENT (OUTPATIENT)
Dept: ULTRASOUND IMAGING | Facility: CLINIC | Age: 70
End: 2025-02-07
Payer: MEDICARE

## 2025-02-07 DIAGNOSIS — Z98.890 OTHER SPECIFIED POSTPROCEDURAL STATES: Chronic | ICD-10-CM

## 2025-02-07 DIAGNOSIS — Z00.8 ENCOUNTER FOR OTHER GENERAL EXAMINATION: ICD-10-CM

## 2025-02-07 DIAGNOSIS — Z12.31 ENCOUNTER FOR SCREENING MAMMOGRAM FOR MALIGNANT NEOPLASM OF BREAST: ICD-10-CM

## 2025-02-07 DIAGNOSIS — C53.9 MALIGNANT NEOPLASM OF CERVIX UTERI, UNSPECIFIED: Chronic | ICD-10-CM

## 2025-02-07 DIAGNOSIS — Z12.39 ENCOUNTER FOR OTHER SCREENING FOR MALIGNANT NEOPLASM OF BREAST: ICD-10-CM

## 2025-02-07 PROCEDURE — 77063 BREAST TOMOSYNTHESIS BI: CPT | Mod: 26

## 2025-02-07 PROCEDURE — 77067 SCR MAMMO BI INCL CAD: CPT | Mod: 26

## 2025-02-07 PROCEDURE — 76641 ULTRASOUND BREAST COMPLETE: CPT | Mod: 26,50

## 2025-02-07 PROCEDURE — 77067 SCR MAMMO BI INCL CAD: CPT

## 2025-02-07 PROCEDURE — 77063 BREAST TOMOSYNTHESIS BI: CPT

## 2025-02-07 PROCEDURE — 76641 ULTRASOUND BREAST COMPLETE: CPT

## 2025-02-12 ENCOUNTER — TRANSCRIPTION ENCOUNTER (OUTPATIENT)
Age: 70
End: 2025-02-12

## 2025-02-13 ENCOUNTER — TRANSCRIPTION ENCOUNTER (OUTPATIENT)
Age: 70
End: 2025-02-13

## 2025-02-18 ENCOUNTER — APPOINTMENT (OUTPATIENT)
Dept: INTERNAL MEDICINE | Facility: CLINIC | Age: 70
End: 2025-02-18
Payer: MEDICARE

## 2025-02-18 DIAGNOSIS — Z01.818 ENCOUNTER FOR OTHER PREPROCEDURAL EXAMINATION: ICD-10-CM

## 2025-02-18 DIAGNOSIS — I10 ESSENTIAL (PRIMARY) HYPERTENSION: ICD-10-CM

## 2025-02-18 DIAGNOSIS — I21.9 ACUTE MYOCARDIAL INFARCTION, UNSPECIFIED: ICD-10-CM

## 2025-02-18 DIAGNOSIS — K21.9 GASTRO-ESOPHAGEAL REFLUX DISEASE W/OUT ESOPHAGITIS: ICD-10-CM

## 2025-02-18 DIAGNOSIS — R19.7 DIARRHEA, UNSPECIFIED: ICD-10-CM

## 2025-02-18 DIAGNOSIS — A08.4 VIRAL INTESTINAL INFECTION, UNSPECIFIED: ICD-10-CM

## 2025-02-18 DIAGNOSIS — J45.909 UNSPECIFIED ASTHMA, UNCOMPLICATED: ICD-10-CM

## 2025-02-18 DIAGNOSIS — R11.2 NAUSEA WITH VOMITING, UNSPECIFIED: ICD-10-CM

## 2025-02-18 DIAGNOSIS — R73.03 PREDIABETES.: ICD-10-CM

## 2025-02-18 DIAGNOSIS — E78.5 HYPERLIPIDEMIA, UNSPECIFIED: ICD-10-CM

## 2025-02-18 DIAGNOSIS — I25.10 ATHEROSCLEROTIC HEART DISEASE OF NATIVE CORONARY ARTERY W/OUT ANGINA PECTORIS: ICD-10-CM

## 2025-02-18 PROCEDURE — 99214 OFFICE O/P EST MOD 30 MIN: CPT | Mod: 2W

## 2025-02-18 PROCEDURE — G2211 COMPLEX E/M VISIT ADD ON: CPT | Mod: 2W

## 2025-02-19 ENCOUNTER — TRANSCRIPTION ENCOUNTER (OUTPATIENT)
Age: 70
End: 2025-02-19

## 2025-02-19 RX ORDER — TIRZEPATIDE 2.5 MG/.5ML
2.5 INJECTION, SOLUTION SUBCUTANEOUS
Qty: 4 | Refills: 0 | Status: ACTIVE | COMMUNITY
Start: 2025-02-19

## 2025-03-04 ENCOUNTER — OUTPATIENT (OUTPATIENT)
Dept: OUTPATIENT SERVICES | Facility: HOSPITAL | Age: 70
LOS: 1 days | Discharge: HOME | End: 2025-03-04

## 2025-03-04 ENCOUNTER — RX RENEWAL (OUTPATIENT)
Age: 70
End: 2025-03-04

## 2025-03-04 VITALS
SYSTOLIC BLOOD PRESSURE: 139 MMHG | RESPIRATION RATE: 18 BRPM | TEMPERATURE: 98 F | WEIGHT: 153.66 LBS | DIASTOLIC BLOOD PRESSURE: 64 MMHG | OXYGEN SATURATION: 98 % | HEART RATE: 71 BPM | HEIGHT: 61 IN

## 2025-03-04 DIAGNOSIS — Z98.890 OTHER SPECIFIED POSTPROCEDURAL STATES: Chronic | ICD-10-CM

## 2025-03-04 DIAGNOSIS — I25.10 ATHEROSCLEROTIC HEART DISEASE OF NATIVE CORONARY ARTERY WITHOUT ANGINA PECTORIS: ICD-10-CM

## 2025-03-04 DIAGNOSIS — C53.9 MALIGNANT NEOPLASM OF CERVIX UTERI, UNSPECIFIED: Chronic | ICD-10-CM

## 2025-03-04 DIAGNOSIS — Z01.818 ENCOUNTER FOR OTHER PREPROCEDURAL EXAMINATION: ICD-10-CM

## 2025-03-04 DIAGNOSIS — I10 ESSENTIAL (PRIMARY) HYPERTENSION: ICD-10-CM

## 2025-03-04 DIAGNOSIS — M75.121 COMPLETE ROTATOR CUFF TEAR OR RUPTURE OF RIGHT SHOULDER, NOT SPECIFIED AS TRAUMATIC: ICD-10-CM

## 2025-03-04 DIAGNOSIS — Z90.722 ACQUIRED ABSENCE OF OVARIES, BILATERAL: Chronic | ICD-10-CM

## 2025-03-04 DIAGNOSIS — M75.101 UNSPECIFIED ROTATOR CUFF TEAR OR RUPTURE OF RIGHT SHOULDER, NOT SPECIFIED AS TRAUMATIC: ICD-10-CM

## 2025-03-04 LAB
ANION GAP SERPL CALC-SCNC: 4 MMOL/L — LOW (ref 5–17)
APTT BLD: 28.7 SEC — SIGNIFICANT CHANGE UP (ref 24.5–35.6)
BASOPHILS # BLD AUTO: 0.05 K/UL — SIGNIFICANT CHANGE UP (ref 0–0.2)
BASOPHILS NFR BLD AUTO: 0.7 % — SIGNIFICANT CHANGE UP (ref 0–2)
BUN SERPL-MCNC: 14 MG/DL — SIGNIFICANT CHANGE UP (ref 7–23)
CALCIUM SERPL-MCNC: 9.8 MG/DL — SIGNIFICANT CHANGE UP (ref 8.5–10.1)
CHLORIDE SERPL-SCNC: 105 MMOL/L — SIGNIFICANT CHANGE UP (ref 96–108)
CO2 SERPL-SCNC: 29 MMOL/L — SIGNIFICANT CHANGE UP (ref 22–31)
CREAT SERPL-MCNC: 0.71 MG/DL — SIGNIFICANT CHANGE UP (ref 0.5–1.3)
EGFR: 92 ML/MIN/1.73M2 — SIGNIFICANT CHANGE UP
EGFR: 92 ML/MIN/1.73M2 — SIGNIFICANT CHANGE UP
EOSINOPHIL # BLD AUTO: 0.18 K/UL — SIGNIFICANT CHANGE UP (ref 0–0.5)
EOSINOPHIL NFR BLD AUTO: 2.4 % — SIGNIFICANT CHANGE UP (ref 0–6)
GLUCOSE SERPL-MCNC: 96 MG/DL — SIGNIFICANT CHANGE UP (ref 70–99)
HCT VFR BLD CALC: 42.6 % — SIGNIFICANT CHANGE UP (ref 34.5–45)
HGB BLD-MCNC: 14.2 G/DL — SIGNIFICANT CHANGE UP (ref 11.5–15.5)
IMM GRANULOCYTES NFR BLD AUTO: 0.1 % — SIGNIFICANT CHANGE UP (ref 0–0.9)
INR BLD: 0.96 RATIO — SIGNIFICANT CHANGE UP (ref 0.85–1.16)
LYMPHOCYTES # BLD AUTO: 1.92 K/UL — SIGNIFICANT CHANGE UP (ref 1–3.3)
LYMPHOCYTES # BLD AUTO: 25.9 % — SIGNIFICANT CHANGE UP (ref 13–44)
MCHC RBC-ENTMCNC: 29.5 PG — SIGNIFICANT CHANGE UP (ref 27–34)
MCHC RBC-ENTMCNC: 33.3 G/DL — SIGNIFICANT CHANGE UP (ref 32–36)
MCV RBC AUTO: 88.6 FL — SIGNIFICANT CHANGE UP (ref 80–100)
MONOCYTES # BLD AUTO: 0.56 K/UL — SIGNIFICANT CHANGE UP (ref 0–0.9)
MONOCYTES NFR BLD AUTO: 7.6 % — SIGNIFICANT CHANGE UP (ref 2–14)
NEUTROPHILS # BLD AUTO: 4.68 K/UL — SIGNIFICANT CHANGE UP (ref 1.8–7.4)
NEUTROPHILS NFR BLD AUTO: 63.3 % — SIGNIFICANT CHANGE UP (ref 43–77)
NRBC BLD AUTO-RTO: 0 /100 WBCS — SIGNIFICANT CHANGE UP (ref 0–0)
PLATELET # BLD AUTO: 323 K/UL — SIGNIFICANT CHANGE UP (ref 150–400)
POTASSIUM SERPL-MCNC: 3.9 MMOL/L — SIGNIFICANT CHANGE UP (ref 3.5–5.3)
POTASSIUM SERPL-SCNC: 3.9 MMOL/L — SIGNIFICANT CHANGE UP (ref 3.5–5.3)
PROTHROM AB SERPL-ACNC: 10.9 SEC — SIGNIFICANT CHANGE UP (ref 9.9–13.4)
RBC # BLD: 4.81 M/UL — SIGNIFICANT CHANGE UP (ref 3.8–5.2)
RBC # FLD: 12.7 % — SIGNIFICANT CHANGE UP (ref 10.3–14.5)
SODIUM SERPL-SCNC: 138 MMOL/L — SIGNIFICANT CHANGE UP (ref 135–145)
WBC # BLD: 7.4 K/UL — SIGNIFICANT CHANGE UP (ref 3.8–10.5)
WBC # FLD AUTO: 7.4 K/UL — SIGNIFICANT CHANGE UP (ref 3.8–10.5)

## 2025-03-04 NOTE — H&P PST ADULT - PROBLEM SELECTOR PLAN 4
Detail Level: Detailed Preop instructions provided including NPO status. Hibiclens wash for infection control. Patient aware to stop NSAID, OTC herbals  for 7-10 days, needs to be accompanied  by adult upon discharge.  Patient verbalized understanding  anesthesiologist to review pst labs, ekg, medical clearances and optimization for surgery

## 2025-03-04 NOTE — H&P PST ADULT - NSICDXPASTSURGICALHX_GEN_ALL_CORE_FT
PAST SURGICAL HISTORY:  Cervical cancer LEEP    H/O bilateral oophorectomy     S/P angioplasty with stent  in LAD  and  in Ramus    S/P colon resection     S/P cone biopsy of cervix     S/P hammer toe correction     Status post bunionectomy right with hardware    Status post

## 2025-03-04 NOTE — H&P PST ADULT - ASSESSMENT
right rotator cuff tear   CAPRINI SCORE    AGE RELATED RISK FACTORS                                                             [ ] Age 41-60 years                                            (1 Point)  [x ] Age: 61-74 years                                           (2 Points)                 [ ] Age= 75 years                                                (3 Points)             DISEASE RELATED RISK FACTORS                                                       [ ] Edema in the lower extremities                 (1 Point)                     [ ] Varicose veins                                               (1 Point)                                 [ x] BMI > 25 Kg/m2                                            (1 Point)                                  [ ] Serious infection (ie PNA)                            (1 Point)                     [ ] Lung disease ( COPD, Emphysema)            (1 Point)                                                                          [ ] Acute myocardial infarction                         (1 Point)                  [ ] Congestive heart failure (in the previous month)  (1 Point)         [ ] Inflammatory bowel disease                            (1 Point)                  [ ] Central venous access, PICC or Port               (2 points)       (within the last month)                                                                [ ] Stroke (in the previous month)                        (5 Points)    [ ] Previous or present malignancy                       (2 points)                                                                                                                                                         HEMATOLOGY RELATED FACTORS                                                         [ ] Prior episodes of VTE                                     (3 Points)                     [ ] Positive family history for VTE                      (3 Points)                  [ ] Prothrombin 26822 A                                     (3 Points)                     [ ] Factor V Leiden                                                (3 Points)                        [ ] Lupus anticoagulants                                      (3 Points)                                                           [ ] Anticardiolipin antibodies                              (3 Points)                                                       [ ] High homocysteine in the blood                   (3 Points)                                             [ ] Other congenital or acquired thrombophilia      (3 Points)                                                [ ] Heparin induced thrombocytopenia                  (3 Points)                                        MOBILITY RELATED FACTORS  [ ] Bed rest                                                         (1 Point)  [ ] Plaster cast                                                    (2 points)  [ ] Bed bound for more than 72 hours           (2 Points)    GENDER SPECIFIC FACTORS  [ ] Pregnancy or had a baby within the last month   (1 Point)  [ ] Post-partum < 6 weeks                                   (1 Point)  [ ] Hormonal therapy  or oral contraception   (1 Point)  [ ] History of pregnancy complications              (1 point)  [ ] Unexplained or recurrent              (1 Point)    OTHER RISK FACTORS                                           (1 Point)  [ ] BMI >40, smoking, diabetes requiring insulin, chemotherapy  blood transfusions and length of surgery over 2 hours    SURGERY RELATED RISK FACTORS  [ ]  Section within the last month     (1 Point)  [ ] Minor surgery                                                  (1 Point)  x[ ] Arthroscopic surgery                                       (2 Points)  [ ] Planned major surgery lasting more            (2 Points)      than 45 minutes     [ ] Elective hip or knee joint replacement       (5 points)       surgery                                                TRAUMA RELATED RISK FACTORS  [ ] Fracture of the hip, pelvis, or leg                       (5 Points)  [ ] Spinal cord injury resulting in paralysis             (5 points)       (in the previous month)    [ ] Paralysis  (less than 1 month)                             (5 Points)  [ ] Multiple Trauma within 1 month                        (5 Points)    Total Score [     5   ]    Caprini Score 0-2: Low Risk, NO VTE prophylaxis required for most patients, encourage ambulation  Caprini Score 3-6: Moderate Risk , pharmacologic VTE prophylaxis is indicated for most patients (in the absence of contraindications)  Caprini Score Greater than or =7: High risk, pharmocologic VTE prophylaxis indicated for most patients (in the absence of contraindications)

## 2025-03-04 NOTE — H&P PST ADULT - HISTORY OF PRESENT ILLNESS
68 yo  female, pmhx htn, hld, cad ( cardiac stent x 2 in 2007 and 2012) allergies ,  c/o right shoulder pain 2/2 rotator cuff tear - scheduled for  right shoulder arthroscopy, subacromial decompression , acromioplasty , glenohumeral debroidenent  , rotator cuff repair , possible bicep tenotomy    denies recent travels in the past 30 days. No fever, SOB, cough, flu like symptoms or body rash- covid screen

## 2025-03-04 NOTE — H&P PST ADULT - PROBLEM SELECTOR PLAN 1
scheduled for  right shoulder arthroscopy, subacromial decompression , acromioplasty , glenohumeral debridement  , rotator cuff repair , possible bicep tenotomy

## 2025-03-06 ENCOUNTER — APPOINTMENT (OUTPATIENT)
Dept: CARDIOLOGY | Facility: CLINIC | Age: 70
End: 2025-03-06
Payer: MEDICARE

## 2025-03-06 ENCOUNTER — NON-APPOINTMENT (OUTPATIENT)
Age: 70
End: 2025-03-06

## 2025-03-06 VITALS
DIASTOLIC BLOOD PRESSURE: 84 MMHG | WEIGHT: 150 LBS | SYSTOLIC BLOOD PRESSURE: 149 MMHG | BODY MASS INDEX: 28.32 KG/M2 | HEART RATE: 66 BPM | OXYGEN SATURATION: 95 % | HEIGHT: 61 IN

## 2025-03-06 DIAGNOSIS — E78.5 HYPERLIPIDEMIA, UNSPECIFIED: ICD-10-CM

## 2025-03-06 DIAGNOSIS — I25.10 ATHEROSCLEROTIC HEART DISEASE OF NATIVE CORONARY ARTERY W/OUT ANGINA PECTORIS: ICD-10-CM

## 2025-03-06 PROCEDURE — 93000 ELECTROCARDIOGRAM COMPLETE: CPT

## 2025-03-06 PROCEDURE — G2211 COMPLEX E/M VISIT ADD ON: CPT

## 2025-03-06 PROCEDURE — 99214 OFFICE O/P EST MOD 30 MIN: CPT

## 2025-03-18 ENCOUNTER — APPOINTMENT (OUTPATIENT)
Dept: INTERNAL MEDICINE | Facility: CLINIC | Age: 70
End: 2025-03-18
Payer: MEDICARE

## 2025-03-18 VITALS
HEART RATE: 71 BPM | WEIGHT: 150 LBS | DIASTOLIC BLOOD PRESSURE: 78 MMHG | BODY MASS INDEX: 28.32 KG/M2 | HEIGHT: 61 IN | OXYGEN SATURATION: 98 % | SYSTOLIC BLOOD PRESSURE: 173 MMHG | TEMPERATURE: 97.8 F

## 2025-03-18 VITALS — SYSTOLIC BLOOD PRESSURE: 130 MMHG | DIASTOLIC BLOOD PRESSURE: 80 MMHG

## 2025-03-18 DIAGNOSIS — K59.09 OTHER CONSTIPATION: ICD-10-CM

## 2025-03-18 DIAGNOSIS — E78.5 HYPERLIPIDEMIA, UNSPECIFIED: ICD-10-CM

## 2025-03-18 DIAGNOSIS — R73.03 PREDIABETES.: ICD-10-CM

## 2025-03-18 DIAGNOSIS — K21.9 GASTRO-ESOPHAGEAL REFLUX DISEASE W/OUT ESOPHAGITIS: ICD-10-CM

## 2025-03-18 DIAGNOSIS — Z86.19 PERSONAL HISTORY OF OTHER INFECTIOUS AND PARASITIC DISEASES: ICD-10-CM

## 2025-03-18 DIAGNOSIS — I25.10 ATHEROSCLEROTIC HEART DISEASE OF NATIVE CORONARY ARTERY W/OUT ANGINA PECTORIS: ICD-10-CM

## 2025-03-18 DIAGNOSIS — M75.121 COMPLETE ROTATOR CUFF TEAR OR RUPTURE OF RIGHT SHOULDER, NOT SPECIFIED AS TRAUMATIC: ICD-10-CM

## 2025-03-18 DIAGNOSIS — J45.909 UNSPECIFIED ASTHMA, UNCOMPLICATED: ICD-10-CM

## 2025-03-18 DIAGNOSIS — Z87.898 PERSONAL HISTORY OF OTHER SPECIFIED CONDITIONS: ICD-10-CM

## 2025-03-18 DIAGNOSIS — I21.9 ACUTE MYOCARDIAL INFARCTION, UNSPECIFIED: ICD-10-CM

## 2025-03-18 DIAGNOSIS — Z87.42 PERSONAL HISTORY OF OTHER DISEASES OF THE FEMALE GENITAL TRACT: ICD-10-CM

## 2025-03-18 DIAGNOSIS — Z85.41 PERSONAL HISTORY OF MALIGNANT NEOPLASM OF CERVIX UTERI: ICD-10-CM

## 2025-03-18 DIAGNOSIS — I10 ESSENTIAL (PRIMARY) HYPERTENSION: ICD-10-CM

## 2025-03-18 DIAGNOSIS — Z01.818 ENCOUNTER FOR OTHER PREPROCEDURAL EXAMINATION: ICD-10-CM

## 2025-03-18 PROCEDURE — 99215 OFFICE O/P EST HI 40 MIN: CPT

## 2025-03-18 PROCEDURE — G2211 COMPLEX E/M VISIT ADD ON: CPT

## 2025-03-20 PROBLEM — Z01.818 PREOP EXAMINATION: Status: ACTIVE | Noted: 2025-03-18

## 2025-03-24 ENCOUNTER — TRANSCRIPTION ENCOUNTER (OUTPATIENT)
Age: 70
End: 2025-03-24

## 2025-03-24 ENCOUNTER — APPOINTMENT (OUTPATIENT)
Dept: ORTHOPEDIC SURGERY | Facility: HOSPITAL | Age: 70
End: 2025-03-24
Payer: MEDICARE

## 2025-03-24 ENCOUNTER — OUTPATIENT (OUTPATIENT)
Dept: OUTPATIENT SERVICES | Facility: HOSPITAL | Age: 70
LOS: 1 days | Discharge: HOME | End: 2025-03-24

## 2025-03-24 VITALS
HEIGHT: 61 IN | TEMPERATURE: 98 F | OXYGEN SATURATION: 97 % | RESPIRATION RATE: 15 BRPM | WEIGHT: 149.91 LBS | SYSTOLIC BLOOD PRESSURE: 172 MMHG | DIASTOLIC BLOOD PRESSURE: 68 MMHG | HEART RATE: 75 BPM

## 2025-03-24 VITALS
TEMPERATURE: 98 F | RESPIRATION RATE: 18 BRPM | HEART RATE: 82 BPM | OXYGEN SATURATION: 98 % | SYSTOLIC BLOOD PRESSURE: 135 MMHG | DIASTOLIC BLOOD PRESSURE: 70 MMHG

## 2025-03-24 DIAGNOSIS — Z90.722 ACQUIRED ABSENCE OF OVARIES, BILATERAL: Chronic | ICD-10-CM

## 2025-03-24 DIAGNOSIS — Z98.890 OTHER SPECIFIED POSTPROCEDURAL STATES: Chronic | ICD-10-CM

## 2025-03-24 DIAGNOSIS — C53.9 MALIGNANT NEOPLASM OF CERVIX UTERI, UNSPECIFIED: Chronic | ICD-10-CM

## 2025-03-24 PROCEDURE — 29827 SHO ARTHRS SRG RT8TR CUF RPR: CPT | Mod: RT

## 2025-03-24 PROCEDURE — 29826 SHO ARTHRS SRG DECOMPRESSION: CPT | Mod: AS,RT

## 2025-03-24 PROCEDURE — 29823 SHO ARTHRS SRG XTNSV DBRDMT: CPT | Mod: 59,RT

## 2025-03-24 PROCEDURE — 29823 SHO ARTHRS SRG XTNSV DBRDMT: CPT | Mod: AS,59,RT

## 2025-03-24 PROCEDURE — 29820 SHO ARTHRS SRG PRTL SYNVCT: CPT | Mod: AS,RT

## 2025-03-24 PROCEDURE — 29827 SHO ARTHRS SRG RT8TR CUF RPR: CPT | Mod: AS,RT

## 2025-03-24 PROCEDURE — 29826 SHO ARTHRS SRG DECOMPRESSION: CPT | Mod: RT

## 2025-03-24 PROCEDURE — 29820 SHO ARTHRS SRG PRTL SYNVCT: CPT | Mod: RT

## 2025-03-24 DEVICE — ANCHOR BIO-COMP SWIVLCK DBL LOADED 4.75X22MM MIN ORDER 5: Type: IMPLANTABLE DEVICE | Site: RIGHT | Status: FUNCTIONAL

## 2025-03-24 RX ORDER — TRAMADOL HYDROCHLORIDE 50 MG/1
50 TABLET, COATED ORAL
Qty: 21 | Refills: 0 | Status: ACTIVE | COMMUNITY
Start: 2025-03-24 | End: 1900-01-01

## 2025-03-24 RX ORDER — FENTANYL CITRATE-0.9 % NACL/PF 100MCG/2ML
25 SYRINGE (ML) INTRAVENOUS
Refills: 0 | Status: DISCONTINUED | OUTPATIENT
Start: 2025-03-24 | End: 2025-03-26

## 2025-03-24 RX ORDER — APREPITANT 40 MG/1
40 CAPSULE ORAL ONCE
Refills: 0 | Status: COMPLETED | OUTPATIENT
Start: 2025-03-24 | End: 2025-03-24

## 2025-03-24 RX ORDER — DOCUSATE SODIUM 100 MG/1
100 CAPSULE ORAL EVERY 8 HOURS
Qty: 21 | Refills: 0 | Status: ACTIVE | COMMUNITY
Start: 2025-03-24 | End: 1900-01-01

## 2025-03-24 RX ORDER — SODIUM CHLORIDE 9 G/1000ML
1000 INJECTION, SOLUTION INTRAVENOUS
Refills: 0 | Status: DISCONTINUED | OUTPATIENT
Start: 2025-03-24 | End: 2025-03-26

## 2025-03-24 RX ADMIN — APREPITANT 40 MILLIGRAM(S): 40 CAPSULE ORAL at 12:32

## 2025-03-24 RX ADMIN — Medication 20 MILLIGRAM(S): at 12:40

## 2025-03-24 NOTE — BRIEF OPERATIVE NOTE - OPERATION/FINDINGS
See op report    S/P Right shoulder arthroscopic glenohumeral debridement, biceps tenotomy, rotator cuff repair, subacromial decompression

## 2025-03-24 NOTE — ASU PATIENT PROFILE, ADULT - FALL HARM RISK - HARM RISK INTERVENTIONS

## 2025-03-24 NOTE — ASU PATIENT PROFILE, ADULT - PAIN DESCRIPTION (FREQUENCY/QUALITY), PROFILE
[facility] Care Coordination Contact    Received a call back from AL nurse Hernández and she said that no new orders were needed for PT and that member is able to have the in house PT for therapy. Updated daughter Obdulio.  Autumn Jacobsen RN, PHN, [facility] Care Coordination  [phone]. cell-[phone]     intermittent

## 2025-03-24 NOTE — ASU PREOP CHECKLIST - AS TEMP SITE
PATIENT NAME: SVETLANA ALVARADO   MEDICAL RECORD NUMBER: [id number]   ACCOUNT NUMBER: [id number]   ADMIT DATE: 08/30/2018   DISCHARGE DATE: 08/30/2018   ATTENDING PHYSICIAN: KELLY BURGESS M.D. ORTHOPAEDICS   ROOM #:   SERVICE: [facility]                              PREOPERATIVE HISTORY AND PHYSICAL         The patient is having surgery with Dr. Burgess of [facility] on   08/30/2018.  The patient is having repair ORIF of left clavicle fracture after   trauma.      PAST MEDICAL HISTORY:  Includes a cardiac stent on February 9th with Dr. Chávez, coronary artery disease, chronic insomnia uses aids, chronic lorazepam   use, chronic kidney disease, urinary tract infections frequent and recurrent,   hypercholesterolemia, hypotension stopped metoprolol in 2015, urinary   incontinence, varicose veins, gastroesophageal reflux disease, melanoma in the   left arm, hematuria, pulmonary emboli with DVT in 1998, osteopenia.      PAST SURGICAL HISTORY:  Includes vein surgery and a tonsillectomy.  The patient   had a colonoscopy and an EGD in 2005.      SOCIAL HISTORY:  Never a smoker, alcohol none, illicits none.  The patient has   poor exercise habits.  The patient is fearful of going on the treadmill.  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  Currently, .      ALLERGIES:  DETROL.  SHE GETS RASHES AND HIVES AND DIFFICULTY SWALLOWING.      FAMILY HISTORY:  Significant for the anemia in her mother, multiple endocrine   neoplasia; brother with brain cancer and a [employer] vet; a daughter with double   mastectomy in 2017; sister with thyroid disease and depression.      PHYSICAL EXAMINATION:  VITAL SIGNS:  /64, pulse 98, height 66, weight 158,   BMI 25.5, BSA 1.81, O2 sat 99% on room air.   GENERAL:  The patient is awake, alert, and oriented, in no acute distress.   HEENT:  Unremarkable.   NECK:  Supple.  No nodes.  No JVD.  Left deformity of the clavicle is obvious   with a left arm splint.   HEART:  S1 and S2.    LUNGS:  Clear to auscultation.   ABDOMEN:  Soft, nontender.  Positive bowel sounds.   EXTREMITIES:  Warm and dry without clubbing, cyanosis, or edema.  She has a   circulation, movement, and sensation to her left hand and elbow.      CURRENT MEDICATIONS:  Include aspirin 81 mg a day currently on hold, __________   oral capsule daily, estrogen vaginal cream first week, Norco for pain recently,   Lamisil cream, Lexapro 10 mg daily, lorazepam 0.5 mg at bedtime and b.i.d.   p.r.n., midodrine 5 mg as needed for lightheadedness related to hypotension,   Myrbetriq 25 mg daily, Prevacid 15 mg a day, triamcinolone ointment for pruritic   areas, and vitamin D3.      REVIEW OF SYSTEMS:  The patient denies chest pain, orthopnea, or PND.  She has   no problems with bleeding, clotting, or fevers with anesthesia.  She has no   problems in her family with bleeding, clotting, or fevers with anesthesia.  The   patient has no exposures to hepatitis, tuberculosis, or HIV.  She has not had a   blood transfusion.      LABORATORY AND DIAGNOSTIC DATA:  The patient's labs:  WBC 7.8, hemoglobin 11,   hematocrit 34.5, platelets are 267.  Differential normal.  Urinalysis shows   positive nitrites and moderate leukocyte esterase and a large bacteria.      Her chest x-ray is nondiagnostic for pneumonia, pneumothorax, or adenopathy.   Again, the fracture of mid distal clavicle is re-visualized.  She had a CAT scan   of her abdomen and pelvis in February of 2008, which showed gallbladder with   adenomyomatosis, endometrial hypodensity, renal cyst, adnexal 1-cm cyst.  The   patient is planning an ultrasound of the ovaries.  Colonoscopy and EGD for her   newfound anemia and an evaluation by Urology for recurrent infections of the   bladder and urinary tract infection.  EKG show sinus rhythm with no ST or T-wave   changes.      ASSESSMENT AND PLAN:   1.  This is a 77-year-old female, who had a traumatic fall and fractured her       clavicle and now  needs surgical repair.  Cardiac risk is low.  She has had       no symptoms of chest pain, shortness of breath, orthopnea, paroxysmal       nocturnal dyspnea since her stent.  Her blood pressure is well controlled.   2.  Infection risk is moderate.  The patient was restarted on Keflex and Cipro       for urinary tract infection that was now asymptomatic, but has had       recurrence over the last several weeks to months.  This is a prophylactic       dose that she is asymptomatic and may be colonized.   3.  Ovarian cyst will be followed up as an outpatient as well.   4.  Anemia.  We followed up after colonoscopy and EGD.  Again, these were all       situations that were being worked up before the patient had a traumatic       fall and now needing surgery.   5.  Pulmonary risk is low.   6.  Other medical problems.  The patient does take chronic anxiolytic therapy       and might be tolerant to pain medications and/or anesthesia.  Other medical       problems as above.      PLAN:  Proceed as planned for surgery.  Please call if the patient is admitted   at [phone].            _________________________________   Jessie Chowdary D.O. INTERNAL MEDICINE         CC:   Jessie Chowdary D.O.      JO ANN/DALILA   DD: 08/29/2018  DT: 08/29/2018   TD: 17:39  TT: 18:32   [id number]         oral

## 2025-03-24 NOTE — ASU PREOP CHECKLIST - INTERNAL PROSTHESES
no cardiac stent x2, both feet/yes(specify) cardiac stent x2, both feet ( s/p bunionectomy)/yes(specify)

## 2025-03-24 NOTE — ASU DISCHARGE PLAN (ADULT/PEDIATRIC) - NS MD DC FALL RISK RISK
For information on Fall & Injury Prevention, visit: [email or web address] OR  [email or web address] OR  https://www.cdc.gov/steadi/patient.html

## 2025-03-24 NOTE — ASU DISCHARGE PLAN (ADULT/PEDIATRIC) - FINANCIAL ASSISTANCE
[facility] provides services at a reduced cost to those who are determined to be eligible through [facility]’s financial assistance program. Information regarding [facility]’s financial assistance program can be found by going to [email or web address] or by calling [phone].

## 2025-03-24 NOTE — BRIEF OPERATIVE NOTE - NSICDXBRIEFPROCEDURE_GEN_ALL_CORE_FT
PROCEDURES:  Arthroscopy of shoulder with repair of rotator cuff 24-Mar-2025 14:23:48  Miladis Shen

## 2025-03-31 DIAGNOSIS — Z79.02 LONG TERM (CURRENT) USE OF ANTITHROMBOTICS/ANTIPLATELETS: ICD-10-CM

## 2025-03-31 DIAGNOSIS — S46.111A STRAIN OF MUSCLE, FASCIA AND TENDON OF LONG HEAD OF BICEPS, RIGHT ARM, INITIAL ENCOUNTER: ICD-10-CM

## 2025-03-31 DIAGNOSIS — M65.911 UNSPECIFIED SYNOVITIS AND TENOSYNOVITIS, RIGHT SHOULDER: ICD-10-CM

## 2025-03-31 DIAGNOSIS — Z79.82 LONG TERM (CURRENT) USE OF ASPIRIN: ICD-10-CM

## 2025-03-31 DIAGNOSIS — Z86.16 PERSONAL HISTORY OF COVID-19: ICD-10-CM

## 2025-03-31 DIAGNOSIS — Z85.41 PERSONAL HISTORY OF MALIGNANT NEOPLASM OF CERVIX UTERI: ICD-10-CM

## 2025-03-31 DIAGNOSIS — S46.011A STRAIN OF MUSCLE(S) AND TENDON(S) OF THE ROTATOR CUFF OF RIGHT SHOULDER, INITIAL ENCOUNTER: ICD-10-CM

## 2025-03-31 DIAGNOSIS — M75.51 BURSITIS OF RIGHT SHOULDER: ICD-10-CM

## 2025-03-31 DIAGNOSIS — Z95.5 PRESENCE OF CORONARY ANGIOPLASTY IMPLANT AND GRAFT: ICD-10-CM

## 2025-03-31 DIAGNOSIS — S43.491A OTHER SPRAIN OF RIGHT SHOULDER JOINT, INITIAL ENCOUNTER: ICD-10-CM

## 2025-03-31 DIAGNOSIS — I10 ESSENTIAL (PRIMARY) HYPERTENSION: ICD-10-CM
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
## 2025-03-31 DIAGNOSIS — K21.9 GASTRO-ESOPHAGEAL REFLUX DISEASE WITHOUT ESOPHAGITIS: ICD-10-CM

## 2025-03-31 DIAGNOSIS — Z88.5 ALLERGY STATUS TO NARCOTIC AGENT: ICD-10-CM

## 2025-03-31 DIAGNOSIS — R73.03 PREDIABETES: ICD-10-CM

## 2025-03-31 DIAGNOSIS — Z79.85 LONG-TERM (CURRENT) USE OF INJECTABLE NON-INSULIN ANTIDIABETIC DRUGS: ICD-10-CM
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
## 2025-03-31 DIAGNOSIS — M19.011 PRIMARY OSTEOARTHRITIS, RIGHT SHOULDER: ICD-10-CM

## 2025-03-31 DIAGNOSIS — E78.5 HYPERLIPIDEMIA, UNSPECIFIED: ICD-10-CM

## 2025-03-31 DIAGNOSIS — I25.10 ATHEROSCLEROTIC HEART DISEASE OF NATIVE CORONARY ARTERY WITHOUT ANGINA PECTORIS: ICD-10-CM
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
## 2025-03-31 DIAGNOSIS — Z88.0 ALLERGY STATUS TO PENICILLIN: ICD-10-CM

## 2025-03-31 DIAGNOSIS — I25.2 OLD MYOCARDIAL INFARCTION: ICD-10-CM

## 2025-04-04 ENCOUNTER — APPOINTMENT (OUTPATIENT)
Dept: ORTHOPEDIC SURGERY | Facility: CLINIC | Age: 70
End: 2025-04-04
Payer: MEDICARE

## 2025-04-04 VITALS — WEIGHT: 150 LBS | HEIGHT: 61 IN | BODY MASS INDEX: 28.32 KG/M2

## 2025-04-04 DIAGNOSIS — M65.311 TRIGGER THUMB, RIGHT THUMB: ICD-10-CM

## 2025-04-04 PROCEDURE — 20550 NJX 1 TENDON SHEATH/LIGAMENT: CPT | Mod: RT,59

## 2025-04-08 ENCOUNTER — APPOINTMENT (OUTPATIENT)
Dept: ORTHOPEDIC SURGERY | Facility: CLINIC | Age: 70
End: 2025-04-08
Payer: MEDICARE

## 2025-04-08 DIAGNOSIS — M19.011 PRIMARY OSTEOARTHRITIS, RIGHT SHOULDER: ICD-10-CM

## 2025-04-08 DIAGNOSIS — M75.121 COMPLETE ROTATOR CUFF TEAR OR RUPTURE OF RIGHT SHOULDER, NOT SPECIFIED AS TRAUMATIC: ICD-10-CM

## 2025-04-08 PROCEDURE — 99024 POSTOP FOLLOW-UP VISIT: CPT

## 2025-04-09 ENCOUNTER — RX RENEWAL (OUTPATIENT)
Age: 70
End: 2025-04-09

## 2025-04-25 NOTE — ED PROVIDER NOTE - TIMING
Interval H&P:   No significant changes have occurred since the patient had her index history and physical exam performed. The operative side has been appropriately marked and the consent signed.       Indications, risks, benefits, and alternatives of both non-operative and operative treatment was discussed with the patient. We discussed operative treatment in the form of Removal of Hardware Right Distal Tibia. We discussed risks including but not limited to bleeding, infection, damage to nearby structures, persistent pain, ankle stiffness, need for revision surgery, DVT, PE, and even death. We discussed that in cases of traumatic injury, there is a risk of the development of post-traumatic arthritis despite operative intervention. The patient expressed understanding and wished to proceed with surgical treatment. We will plan on proceeding with surgery as scheduled today.      none

## 2025-04-29 ENCOUNTER — NON-APPOINTMENT (OUTPATIENT)
Age: 70
End: 2025-04-29

## 2025-04-29 ENCOUNTER — APPOINTMENT (OUTPATIENT)
Dept: ORTHOPEDIC SURGERY | Facility: CLINIC | Age: 70
End: 2025-04-29
Payer: MEDICARE

## 2025-04-29 DIAGNOSIS — M75.121 COMPLETE ROTATOR CUFF TEAR OR RUPTURE OF RIGHT SHOULDER, NOT SPECIFIED AS TRAUMATIC: ICD-10-CM

## 2025-04-29 DIAGNOSIS — M19.011 PRIMARY OSTEOARTHRITIS, RIGHT SHOULDER: ICD-10-CM

## 2025-04-29 PROCEDURE — 99024 POSTOP FOLLOW-UP VISIT: CPT

## 2025-05-14 ENCOUNTER — RX RENEWAL (OUTPATIENT)
Age: 70
End: 2025-05-14

## 2025-05-27 ENCOUNTER — APPOINTMENT (OUTPATIENT)
Dept: ORTHOPEDIC SURGERY | Facility: CLINIC | Age: 70
End: 2025-05-27
Payer: MEDICARE

## 2025-05-27 DIAGNOSIS — M75.121 COMPLETE ROTATOR CUFF TEAR OR RUPTURE OF RIGHT SHOULDER, NOT SPECIFIED AS TRAUMATIC: ICD-10-CM

## 2025-05-27 DIAGNOSIS — M19.011 PRIMARY OSTEOARTHRITIS, RIGHT SHOULDER: ICD-10-CM

## 2025-05-27 PROCEDURE — 99024 POSTOP FOLLOW-UP VISIT: CPT

## 2025-06-17 ENCOUNTER — APPOINTMENT (OUTPATIENT)
Dept: ORTHOPEDIC SURGERY | Facility: CLINIC | Age: 70
End: 2025-06-17
Payer: MEDICARE

## 2025-06-17 PROCEDURE — 99024 POSTOP FOLLOW-UP VISIT: CPT

## 2025-07-01 ENCOUNTER — RX RENEWAL (OUTPATIENT)
Age: 70
End: 2025-07-01

## 2025-08-06 ENCOUNTER — RX RENEWAL (OUTPATIENT)
Age: 70
End: 2025-08-06

## 2025-08-19 ENCOUNTER — APPOINTMENT (OUTPATIENT)
Dept: ORTHOPEDIC SURGERY | Facility: CLINIC | Age: 70
End: 2025-08-19
Payer: MEDICARE

## 2025-08-19 VITALS — WEIGHT: 150 LBS | HEIGHT: 61 IN | BODY MASS INDEX: 28.32 KG/M2

## 2025-08-19 DIAGNOSIS — M65.311 TRIGGER THUMB, RIGHT THUMB: ICD-10-CM

## 2025-08-19 PROCEDURE — 99213 OFFICE O/P EST LOW 20 MIN: CPT | Mod: 25

## 2025-08-19 PROCEDURE — 20550 NJX 1 TENDON SHEATH/LIGAMENT: CPT

## 2025-08-27 LAB
25(OH)D3 SERPL-MCNC: 44 NG/ML
ALBUMIN SERPL ELPH-MCNC: 4.5 G/DL
ALP BLD-CCNC: 90 U/L
ALT SERPL-CCNC: 27 U/L
ANION GAP SERPL CALC-SCNC: 13 MMOL/L
AST SERPL-CCNC: 23 U/L
BASOPHILS # BLD AUTO: 0.05 K/UL
BASOPHILS NFR BLD AUTO: 0.7 %
BILIRUB SERPL-MCNC: 0.3 MG/DL
BUN SERPL-MCNC: 15 MG/DL
CALCIUM SERPL-MCNC: 9.9 MG/DL
CHLORIDE SERPL-SCNC: 102 MMOL/L
CHOLEST SERPL-MCNC: 159 MG/DL
CO2 SERPL-SCNC: 25 MMOL/L
CREAT SERPL-MCNC: 0.66 MG/DL
EGFRCR SERPLBLD CKD-EPI 2021: 94 ML/MIN/1.73M2
EOSINOPHIL # BLD AUTO: 0.23 K/UL
EOSINOPHIL NFR BLD AUTO: 3.4 %
ESTIMATED AVERAGE GLUCOSE: 123 MG/DL
GLUCOSE SERPL-MCNC: 91 MG/DL
HBA1C MFR BLD HPLC: 5.9 %
HCT VFR BLD CALC: 45.1 %
HDLC SERPL-MCNC: 66 MG/DL
HGB BLD-MCNC: 14.4 G/DL
IMM GRANULOCYTES NFR BLD AUTO: 0.3 %
LDLC SERPL-MCNC: 78 MG/DL
LYMPHOCYTES # BLD AUTO: 2.14 K/UL
LYMPHOCYTES NFR BLD AUTO: 31.9 %
MAN DIFF?: NORMAL
MCHC RBC-ENTMCNC: 29.8 PG
MCHC RBC-ENTMCNC: 31.9 G/DL
MCV RBC AUTO: 93.2 FL
MONOCYTES # BLD AUTO: 0.56 K/UL
MONOCYTES NFR BLD AUTO: 8.3 %
NEUTROPHILS # BLD AUTO: 3.71 K/UL
NEUTROPHILS NFR BLD AUTO: 55.4 %
NONHDLC SERPL-MCNC: 93 MG/DL
PLATELET # BLD AUTO: 262 K/UL
POTASSIUM SERPL-SCNC: 4.6 MMOL/L
PROT SERPL-MCNC: 6.9 G/DL
RBC # BLD: 4.84 M/UL
RBC # FLD: 13.5 %
SODIUM SERPL-SCNC: 141 MMOL/L
TRIGL SERPL-MCNC: 78 MG/DL
TSH SERPL-ACNC: 1.13 UIU/ML
WBC # FLD AUTO: 6.71 K/UL

## 2025-08-29 ENCOUNTER — APPOINTMENT (OUTPATIENT)
Dept: INTERNAL MEDICINE | Facility: CLINIC | Age: 70
End: 2025-08-29
Payer: MEDICARE

## 2025-08-29 VITALS
SYSTOLIC BLOOD PRESSURE: 144 MMHG | TEMPERATURE: 97.8 F | BODY MASS INDEX: 26.81 KG/M2 | HEART RATE: 74 BPM | WEIGHT: 142 LBS | HEIGHT: 61 IN | DIASTOLIC BLOOD PRESSURE: 76 MMHG

## 2025-08-29 VITALS — SYSTOLIC BLOOD PRESSURE: 140 MMHG | DIASTOLIC BLOOD PRESSURE: 82 MMHG

## 2025-08-29 DIAGNOSIS — Z87.898 PERSONAL HISTORY OF OTHER SPECIFIED CONDITIONS: ICD-10-CM

## 2025-08-29 DIAGNOSIS — I10 ESSENTIAL (PRIMARY) HYPERTENSION: ICD-10-CM

## 2025-08-29 DIAGNOSIS — K21.9 GASTRO-ESOPHAGEAL REFLUX DISEASE W/OUT ESOPHAGITIS: ICD-10-CM

## 2025-08-29 DIAGNOSIS — I25.10 ATHEROSCLEROTIC HEART DISEASE OF NATIVE CORONARY ARTERY W/OUT ANGINA PECTORIS: ICD-10-CM

## 2025-08-29 DIAGNOSIS — Z23 ENCOUNTER FOR IMMUNIZATION: ICD-10-CM

## 2025-08-29 DIAGNOSIS — R73.03 PREDIABETES.: ICD-10-CM

## 2025-08-29 DIAGNOSIS — F43.21 ADJUSTMENT DISORDER WITH DEPRESSED MOOD: ICD-10-CM

## 2025-08-29 DIAGNOSIS — E78.5 HYPERLIPIDEMIA, UNSPECIFIED: ICD-10-CM

## 2025-08-29 DIAGNOSIS — Z00.00 ENCOUNTER FOR GENERAL ADULT MEDICAL EXAMINATION W/OUT ABNORMAL FINDINGS: ICD-10-CM

## 2025-08-29 DIAGNOSIS — J45.909 UNSPECIFIED ASTHMA, UNCOMPLICATED: ICD-10-CM

## 2025-08-29 DIAGNOSIS — Z01.818 ENCOUNTER FOR OTHER PREPROCEDURAL EXAMINATION: ICD-10-CM

## 2025-08-29 DIAGNOSIS — R94.31 ABNORMAL ELECTROCARDIOGRAM [ECG] [EKG]: ICD-10-CM

## 2025-08-29 DIAGNOSIS — M85.80 OTHER SPECIFIED DISORDERS OF BONE DENSITY AND STRUCTURE, UNSPECIFIED SITE: ICD-10-CM

## 2025-08-29 DIAGNOSIS — G47.00 INSOMNIA, UNSPECIFIED: ICD-10-CM

## 2025-08-29 DIAGNOSIS — I21.9 ACUTE MYOCARDIAL INFARCTION, UNSPECIFIED: ICD-10-CM

## 2025-08-29 DIAGNOSIS — K59.09 OTHER CONSTIPATION: ICD-10-CM

## 2025-08-29 PROCEDURE — 99214 OFFICE O/P EST MOD 30 MIN: CPT | Mod: 25

## 2025-08-29 PROCEDURE — G2211 COMPLEX E/M VISIT ADD ON: CPT

## 2025-08-29 PROCEDURE — G0439: CPT

## 2025-08-29 RX ORDER — MAGNESIUM OXIDE/MAG AA CHELATE 300 MG
CAPSULE ORAL
Refills: 0 | Status: ACTIVE | COMMUNITY

## 2025-09-16 ENCOUNTER — RX RENEWAL (OUTPATIENT)
Age: 70
End: 2025-09-16

## 2025-09-16 ENCOUNTER — APPOINTMENT (OUTPATIENT)
Dept: ORTHOPEDIC SURGERY | Facility: CLINIC | Age: 70
End: 2025-09-16
Payer: MEDICARE

## 2025-09-16 DIAGNOSIS — M75.121 COMPLETE ROTATOR CUFF TEAR OR RUPTURE OF RIGHT SHOULDER, NOT SPECIFIED AS TRAUMATIC: ICD-10-CM

## 2025-09-16 DIAGNOSIS — M19.011 PRIMARY OSTEOARTHRITIS, RIGHT SHOULDER: ICD-10-CM

## 2025-09-16 PROCEDURE — 99213 OFFICE O/P EST LOW 20 MIN: CPT

## (undated) DEVICE — DRAPE LAVH 124" X 30" X125"

## (undated) DEVICE — DRSG WEBRIL 4"

## (undated) DEVICE — TROCAR COVIDIEN VERSAONE FIXATION CANNULA 5MM

## (undated) DEVICE — UTERINE MANIPULATOR COOPER SURGICAL 5MM 33CM GREEN

## (undated) DEVICE — SAW BLADE STRYKER MED AGGRESSIVE 9X25X0.38MM

## (undated) DEVICE — GOWN TRIMAX XXL

## (undated) DEVICE — SYR ASEPTO

## (undated) DEVICE — VENODYNE/SCD SLEEVE CALF LARGE

## (undated) DEVICE — TROCAR COVIDIEN VERSAONE BLUNT TIP HASSAN 12MM

## (undated) DEVICE — SOL INJ LR 1000ML

## (undated) DEVICE — ELCTR BOVIE TIP BLADE INSULATED 2.75" EDGE

## (undated) DEVICE — GLV 8 PROTEXIS (WHITE)

## (undated) DEVICE — DRAPE IOBAN 23" X 17"

## (undated) DEVICE — SOL IRR BAG NS 0.9% 3000ML

## (undated) DEVICE — PREP TRAY DRY SKIN PREP SCRUB

## (undated) DEVICE — TROCAR COVIDIEN VERSAPORT BLADELESS OPTICAL 12MM STANDARD

## (undated) DEVICE — SOL IRR POUR H2O 1000ML

## (undated) DEVICE — Device

## (undated) DEVICE — SUT POLYSORB 2-0 30" GU-46

## (undated) DEVICE — DRSG ADAPTIC 3 X 8"

## (undated) DEVICE — WARMING BLANKET LOWER ADULT

## (undated) DEVICE — PACK MINOR WITH LAP

## (undated) DEVICE — ENDOCATCH 10MM SPECIMEN POUCH

## (undated) DEVICE — DRSG MCCONNELL ARM WRAP LG

## (undated) DEVICE — ELCTR BOVIE TIP BLADE INSULATED 6.5" EDGE

## (undated) DEVICE — SUT TIGERTAPE 2MM 7" WHITE/BLACK

## (undated) DEVICE — SUT MONOSOF 4-0 18" C-13

## (undated) DEVICE — SUT POLYSORB 3-0 18" P-12 UNDYED

## (undated) DEVICE — SUT FIBERINK WITH LOOP 1.3MM (WHITE /BLUE)

## (undated) DEVICE — DRSG DERMABOND 0.7ML

## (undated) DEVICE — POSITIONER FOAM EGG CRATE ULNAR 2PCS (PINK)

## (undated) DEVICE — TUBING SUCTION 20FT

## (undated) DEVICE — BLADE SCALPEL SAFETYLOCK #15

## (undated) DEVICE — BIOSEL SIGMOIDOSCOPE KIT DISP

## (undated) DEVICE — TUBING GYRUS ACMI COLLECTION SET 6FT

## (undated) DEVICE — SHAVER BLADE S&N INCISOR PLUS 4.5MM STRAIGHT (VIOLET)

## (undated) DEVICE — SUT POLYSORB 3-0 36" GS-21 UNDYED

## (undated) DEVICE — PLV/PSP-ESU FORCETRIAD T8D6139E: Type: DURABLE MEDICAL EQUIPMENT

## (undated) DEVICE — SHOE  POSTOP SOFTIE DARCO M-M

## (undated) DEVICE — TROCAR COVIDIEN VERSAPORT BLADELESS OPTICAL 11MM STANDARD

## (undated) DEVICE — SUT POLYSORB 4-0 18" P-12 UNDYED

## (undated) DEVICE — DRSG CURITY GAUZE SPONGE 4 X 4" 12-PLY

## (undated) DEVICE — GLV 7 PROTEXIS (WHITE)

## (undated) DEVICE — PLV-SCD MACHINE: Type: DURABLE MEDICAL EQUIPMENT

## (undated) DEVICE — SHOE  POSTOP SOFTIE DARCO M-LG

## (undated) DEVICE — TROCAR COVIDIEN ENDO CLOSE SUTURING DEVICE

## (undated) DEVICE — SHEARS HARMONIC 1100 5MM X 36CM CURVED TIP

## (undated) DEVICE — DRAPE FLUID WARMER 44 X 66"

## (undated) DEVICE — SUT MAXON 0 27" T-12

## (undated) DEVICE — DRAPE TOWEL BLUE 17" X 24"

## (undated) DEVICE — TUBING STRYKER PNEUMOSURE HEATED RTP

## (undated) DEVICE — SPECIMEN CONTAINER 100ML

## (undated) DEVICE — TROCAR COVIDIEN VERSAPORT BLADELESS OPTICAL 5MM STANDARD

## (undated) DEVICE — PLV/PSP-SUCTION IRRIGATOR STRYKER: Type: DURABLE MEDICAL EQUIPMENT

## (undated) DEVICE — PREP CHLORAPREP HI-LITE ORANGE 26ML

## (undated) DEVICE — DRAPE C ARM MINI PACK FOR 6800

## (undated) DEVICE — ELCTR BOVIE PENCIL SMOKE EVACUATION

## (undated) DEVICE — NDL HYPO REGULAR BEVEL 25G X 1.5" (BLUE)

## (undated) DEVICE — DRAPE LIGHT HANDLE COVER (GREEN)

## (undated) DEVICE — DRAPE 3/4 SHEET W REINFORCEMENT 56X77"

## (undated) DEVICE — WARMING BLANKET UPPER ADULT

## (undated) DEVICE — DRAPE U (CLEAR) 47 X 51"

## (undated) DEVICE — PSP-SCD MACHINE: Type: DURABLE MEDICAL EQUIPMENT

## (undated) DEVICE — PACK MINOR

## (undated) DEVICE — SYR LUER LOK 10CC

## (undated) DEVICE — VENODYNE/SCD SLEEVE CALF MEDIUM

## (undated) DEVICE — CANNULA ARTHREX TWIST IN NO SQUIRT CAP 7X7 PURPLE

## (undated) DEVICE — DRAPE SPLIT SHEET 77" X 120"

## (undated) DEVICE — PACK GYN LAPAROSCOPY

## (undated) DEVICE — DRAPE 1/2 SHEET 40X57"

## (undated) DEVICE — SUT MONOCRYL 3-0 27" PS-2 UNDYED

## (undated) DEVICE — TOURNIQUET ESMARK 4"

## (undated) DEVICE — TOURNIQUET CUFF 18" DUAL PORT SINGLE BLADDER W PLC  (BLACK)

## (undated) DEVICE — LAP PAD 4 X 18"

## (undated) DEVICE — TUBING LINVATEC ARTHROSCOPY IN/OUTFLOW

## (undated) DEVICE — SUT POLYSORB 3-0 30" V-20 UNDYED

## (undated) DEVICE — TOURNIQUET CUFF 18" DUAL PORT DUAL BLADDER W PLC (BLACK)

## (undated) DEVICE — BUR STRYKER EGG 4MM

## (undated) DEVICE — DRSG ACE BANDAGE 3"

## (undated) DEVICE — SUT POLYSORB 2-0 30" GS-21 UNDYED

## (undated) DEVICE — FOLEY TRAY 14FR 5CC LF UMETER CLOSED

## (undated) DEVICE — DRSG STERISTRIPS 0.5 X 4"

## (undated) DEVICE — STRYKER INTERPULSE HANDPIECE W IRR SUCTION TUBE

## (undated) DEVICE — SHOE  POSTOP SOFTIE DARCO M-SM

## (undated) DEVICE — SOL IRR BAG H2O 1000ML

## (undated) DEVICE — VACUUM CURETTE BERKLEY OLYMPUS CURVED 7MM

## (undated) DEVICE — D HELP - CLEARVIEW CLEARIFY SYSTEM

## (undated) DEVICE — PACK KNEE ARTHROSCOPY

## (undated) DEVICE — TUBING STRYKER PNEUMOCLEAR HIGH FLOW

## (undated) DEVICE — SOL IRR POUR NS 0.9% 1000ML

## (undated) DEVICE — DRAPE MAYO STAND 23"

## (undated) DEVICE — NDL SPINAL 18G X 3.5" (PINK)

## (undated) DEVICE — GOWN LG

## (undated) DEVICE — DRAPE 3/4 SHEET 52X76"

## (undated) DEVICE — TUBING TUR 2 PRONG

## (undated) DEVICE — DRAPE INSTRUMENT POUCH 6.75" X 11"

## (undated) DEVICE — DRAPE SURGICAL #1010

## (undated) DEVICE — SHEARS COVIDIEN ENDO SHEAR 5MM X 31CM W UNIPOLAR CAUTERY

## (undated) DEVICE — STAPLER COVIDIEN ENDO GIA STANDARD HANDLE

## (undated) DEVICE — TUBING PLUME AWAY 4.0

## (undated) DEVICE — DRAPE SPLIT SHEET 77" X 108"

## (undated) DEVICE — SOL IRR BAG H2O 2000ML

## (undated) DEVICE — SOL INJ NS 0.9% 1000ML

## (undated) DEVICE — SUT POLYSORB 0 30" GU-46

## (undated) DEVICE — TUBING STRYKEFLOW II SUCTION / IRRIGATOR

## (undated) DEVICE — PREP BETADINE KIT

## (undated) DEVICE — DRSG STOCKINETTE TUBULAR COTTON 1PLY 6X72"

## (undated) DEVICE — DRSG XEROFORM 1 X 8"

## (undated) DEVICE — GOWN SMARTGOWN RAGLAN XLG

## (undated) DEVICE — CANNULA ARTHREX CRYSTAL 5.75MMX7MM ORANGE

## (undated) DEVICE — DRSG MASTISOL

## (undated) DEVICE — SAW BLADE STRYKER PRECISION THIN MEDIUM NARROW BLADE 5.5MM X 18MM

## (undated) DEVICE — GLV 8 PROTEXIS (BLUE)

## (undated) DEVICE — SOL IRR LR 3000ML

## (undated) DEVICE — SOL IRR POUR NS 0.9% 500ML

## (undated) DEVICE — PACK LITHOTOMY

## (undated) DEVICE — ELCTR GROUNDING PAD ADULT COVIDIEN

## (undated) DEVICE — SOL IRR POUR H2O 250ML

## (undated) DEVICE — SUT MONOCRYL 4-0 27" PS-2 UNDYED

## (undated) DEVICE — SAW BLADE STRYKER MED NARROW 18X5.5MM

## (undated) DEVICE — SUT SOFSILK 3-0 30" V-20

## (undated) DEVICE — DRSG KLING 3"

## (undated) DEVICE — ARTHREX MULTIFIRE SCORPION NEEDLE

## (undated) DEVICE — PACK GENERAL LAPAROSCOPY

## (undated) DEVICE — GLV 7.5 PROTEXIS (CREAM) NEU-THERA

## (undated) DEVICE — SUT SOFSILK 2-0 30" V-20

## (undated) DEVICE — FOLEY TRAY 16FR LF URINE METER SURESTEP

## (undated) DEVICE — BUR S&N STONECUTTER ELITE 4MM STRAIGHT (MAROON)

## (undated) DEVICE — POSITIONER PINK PAD PIGAZZI SYSTEM